# Patient Record
Sex: MALE | Race: WHITE | NOT HISPANIC OR LATINO | Employment: OTHER | RURAL
[De-identification: names, ages, dates, MRNs, and addresses within clinical notes are randomized per-mention and may not be internally consistent; named-entity substitution may affect disease eponyms.]

---

## 2022-04-18 RX ORDER — AMLODIPINE BESYLATE 10 MG/1
10 TABLET ORAL DAILY
Qty: 30 TABLET | Refills: 0 | Status: SHIPPED | OUTPATIENT
Start: 2022-04-18 | End: 2022-04-18

## 2022-04-18 RX ORDER — OMEPRAZOLE 40 MG/1
CAPSULE, DELAYED RELEASE ORAL
Qty: 30 CAPSULE | Refills: 0 | Status: SHIPPED | OUTPATIENT
Start: 2022-04-18 | End: 2022-04-18

## 2022-04-18 NOTE — TELEPHONE ENCOUNTER
Incoming Refill Request      Medication requested (name and dose): OMEPRAZOLE DR 40MG CAPSULE; AMLODIPINE BESYLATE 10MG TABLET    Pharm[facility] where request [facility] be sent[facility]    Additional details provided by patient: REFILLS     Best call back number: [phone]    Does the patient have less than a 3 day supply:  [x] Yes  [] No    Bolivar OTT Rep  22, 12:28 EDT

## 2022-04-19 DIAGNOSIS — K21.9 GASTROESOPHAGEAL REFLUX DISEASE WITHOUT ESOPHAGITIS: ICD-10-CM

## 2022-04-19 DIAGNOSIS — I10 PRIMARY HYPERTENSION: Primary | ICD-10-CM

## 2022-04-19 RX ORDER — AMLODIPINE BESYLATE 10 MG/1
10 TABLET ORAL DAILY
Qty: 90 TABLET | Refills: 1 | Status: SHIPPED | OUTPATIENT
Start: 2022-04-19 | End: 2022-12-08 | Stop reason: SDUPTHER

## 2022-04-19 RX ORDER — OMEPRAZOLE 40 MG/1
40 CAPSULE, DELAYED RELEASE ORAL DAILY
COMMUNITY
End: 2022-04-19 | Stop reason: SDUPTHER

## 2022-04-19 RX ORDER — OMEPRAZOLE 40 MG/1
40 CAPSULE, DELAYED RELEASE ORAL DAILY
Qty: 90 CAPSULE | Refills: 1 | Status: SHIPPED | OUTPATIENT
Start: 2022-04-19 | End: 2022-11-23

## 2022-04-19 RX ORDER — AMLODIPINE BESYLATE 10 MG/1
10 TABLET ORAL DAILY
COMMUNITY
Start: 2022-04-18 | End: 2022-04-19 | Stop reason: SDUPTHER

## 2022-04-20 ENCOUNTER — OFFICE VISIT (OUTPATIENT)
Dept: FAMILY MEDICINE CLINIC | Facility: CLINIC | Age: 77
End: 2022-04-20

## 2022-04-20 VITALS
SYSTOLIC BLOOD PRESSURE: 130 MMHG | WEIGHT: 246 LBS | DIASTOLIC BLOOD PRESSURE: 70 MMHG | HEIGHT: 71 IN | BODY MASS INDEX: 34.44 KG/M2

## 2022-04-20 DIAGNOSIS — I10 ESSENTIAL HYPERTENSION: ICD-10-CM

## 2022-04-20 DIAGNOSIS — I73.9 PVD (PERIPHERAL VASCULAR DISEASE): ICD-10-CM

## 2022-04-20 DIAGNOSIS — E78.2 MIXED HYPERLIPIDEMIA: ICD-10-CM

## 2022-04-20 DIAGNOSIS — E11.65 UNCONTROLLED TYPE 2 DIABETES MELLITUS WITH HYPERGLYCEMIA: Primary | ICD-10-CM

## 2022-04-20 PROCEDURE — 36415 COLL VENOUS BLD VENIPUNCTURE: CPT | Performed by: STUDENT IN AN ORGANIZED HEALTH CARE EDUCATION/TRAINING PROGRAM

## 2022-04-20 PROCEDURE — 99214 OFFICE O/P EST MOD 30 MIN: CPT | Performed by: STUDENT IN AN ORGANIZED HEALTH CARE EDUCATION/TRAINING PROGRAM

## 2022-04-20 RX ORDER — LISINOPRIL 10 MG/1
TABLET ORAL
COMMUNITY
Start: 2022-04-04 | End: 2022-05-26

## 2022-04-20 RX ORDER — METFORMIN HYDROCHLORIDE 500 MG/1
TABLET, EXTENDED RELEASE ORAL
COMMUNITY
Start: 2022-01-17 | End: 2022-04-20

## 2022-04-20 RX ORDER — ATENOLOL 50 MG/1
TABLET ORAL
COMMUNITY
End: 2022-05-24

## 2022-04-20 RX ORDER — GEMFIBROZIL 600 MG/1
TABLET, FILM COATED ORAL
COMMUNITY
Start: 2022-01-17 | End: 2022-05-26

## 2022-04-20 RX ORDER — ROSUVASTATIN CALCIUM 40 MG/1
TABLET, COATED ORAL
COMMUNITY
End: 2022-10-18

## 2022-04-20 RX ORDER — INSULIN ASPART 100 [IU]/ML
INJECTION, SUSPENSION SUBCUTANEOUS
COMMUNITY
End: 2023-03-01

## 2022-04-20 RX ORDER — CLOPIDOGREL BISULFATE 75 MG/1
TABLET ORAL
COMMUNITY
Start: 2022-01-17 | End: 2022-05-26

## 2022-04-20 RX ORDER — CHLORTHALIDONE 50 MG/1
TABLET ORAL
COMMUNITY
Start: 2022-03-05

## 2022-04-20 NOTE — ASSESSMENT & PLAN NOTE
Stable at this time. He does not think that he needs any refills of medications. Blood work due at this time.

## 2022-04-20 NOTE — PROGRESS NOTES
"Chief Complaint  Diabetes    Subjective          Elzbieta Nunez presents to [facility]  History of Present Illness    Diabetes: He states tat he has not been taking Metformin 2/2 severe diarrhea. He states that he stopped taking the metformin about a week ago, and the diarrhea has stopped. He has been checking his glucose and it is running in the high 100s. He has no low blood glucoses. He is due for labs at this time.     HLD: He has been tolerating medications without trouble. He is unsure if he needs refills on the Crestor.     GERD: Doing well with Omeprazole, better than previous. He still has occasional trouble with \"things getting hung up\". He does not have any severe pain.     HTN: He does not check much at home. He has no chest pain SOA, HA, or palpitations. Unsure if he needs refills.     Objective   Vital Signs:   /70 (BP Location: Left arm, Patient Position: Sitting, Cuff Size: Large Adult)   Ht 180.3 cm (71\")   Wt 112 kg (246 lb)   BMI 34.31 kg/m²     Body mass index is 34.31 kg/m².    Review of Systems    Past History:  Medical History: has a past medical history of Acute otitis externa, Acute otitis media, Atypical chest pain, Cellulitis, Diabetes mellitus type 1 (HCC), Dysuria, Esophagitis, Gastritis, Gastroenteritis, Glucosuria, Hyperlipidemia, Hypertension, Hypothyroidism, Insulin dependent diabetes mellitus type IA (HCC), Nicotine dependence, Obesity, Penetrating foreign body of skin of index finger, initial encounter, Peripheral vascular disease (HCC), and Sinusitis.   Surgical History: has a past surgical history that includes Leg Biopsy (02/01/2009).   Family History: family history includes Developmental delay (age of onset: 74) in his mother; Diabetes (age of onset: 90) in his father; Hypertension (age of onset: 90) in his father.   Social History: reports that he has been smoking cigarettes. He has been smoking about 1.00 pack per day. He has never " used smokeless tobacco. Alcohol use questions deferred to the physician. Drug use questions deferred to the physician.      Current Outpatient Medications:   •  amLODIPine (NORVASC) 10 MG tablet, Take 1 tablet by mouth Daily., Disp: 90 tablet, Rfl: 1  •  atenolol (TENORMIN) 50 MG tablet, atenolol 50 mg tablet  Daily, Disp: , Rfl:   •  clopidogrel (PLAVIX) 75 MG tablet, , Disp: , Rfl:   •  gemfibrozil (LOPID) 600 MG tablet, , Disp: , Rfl:   •  insulin aspart protamine-insulin aspart (novoLOG 70/30) injection, Novolog Mix 70-30 U-100 Insulin 100 unit/mL subcutaneous solution, Disp: , Rfl:   •  lisinopril (PRINIVIL,ZESTRIL) 10 MG tablet, , Disp: , Rfl:   •  omeprazole (priLOSEC) 40 MG capsule, Take 1 capsule by mouth Daily., Disp: 90 capsule, Rfl: 1  •  rosuvastatin (CRESTOR) 40 MG tablet, rosuvastatin 40 mg tablet  Take 1 tablet every day by oral route., Disp: , Rfl:   •  chlorthalidone (HYGROTEN) 50 MG tablet, , Disp: , Rfl:     Allergies: Niacin, Oxycodone hcl, and Oxycodone-acetaminophen    Physical Exam  Constitutional:       General: He is not in acute distress.     Appearance: Normal appearance. He is not ill-appearing or toxic-appearing.   HENT:      Head: Normocephalic and atraumatic.      Right Ear: Tympanic membrane and ear canal normal.      Left Ear: Tympanic membrane and ear canal normal.   Cardiovascular:      Rate and Rhythm: Normal rate and regular rhythm.      Heart sounds: No murmur heard.    No gallop.   Pulmonary:      Effort: Pulmonary effort is normal.      Breath sounds: Normal breath sounds.   Abdominal:      General: Abdomen is flat.      Palpations: Abdomen is soft.      Tenderness: There is no abdominal tenderness. There is no guarding.   Musculoskeletal:      Right lower leg: No edema.      Left lower leg: No edema.   Lymphadenopathy:      Cervical: No cervical adenopathy.   Neurological:      General: No focal deficit present.      Mental Status: He is alert and oriented to person, place,  and time.   Psychiatric:         Mood and Affect: Mood normal.         Thought Content: Thought content normal.          Result Review :{Labs  Result Review  Imaging  Med Tab  Media  Procedures :23}                   Assessment and Plan    Diagnoses and all orders for this visit:    1. Uncontrolled type 2 diabetes mellitus with hyperglycemia (HCC) (Primary)  Assessment & Plan:  He has been taking 10mg Glyburide BID from his family member as this has helped in the past at this time since he stopped the metformin. Will do A1c and blood work and of no improvement in A1c will plan on adding Farxiga. He is agreeable to this.     Orders:  -     CBC & Differential  -     Comprehensive Metabolic Panel  -     Hemoglobin A1c  -     Lipid Panel    2. Essential hypertension  Assessment & Plan:  Stable at this time. He does not think that he needs any refills of medications. Blood work due at this time.     Orders:  -     CBC & Differential  -     Comprehensive Metabolic Panel  -     Hemoglobin A1c  -     Lipid Panel    3. Mixed hyperlipidemia  Assessment & Plan:  Blood work due. Will contact with results. Continue current medications.     Orders:  -     CBC & Differential  -     Comprehensive Metabolic Panel  -     Hemoglobin A1c  -     Lipid Panel    4. PVD (peripheral vascular disease) (HCC)  Assessment & Plan:  He is having a procedure with vascular soon, will follow.     Orders:  -     CBC & Differential  -     Comprehensive Metabolic Panel  -     Hemoglobin A1c  -     Lipid Panel      Follow Up   No follow-ups on file.  Patient was given instructions and counseling regarding his condition or for health maintenance advice. Please see specific information pulled into the AVS if appropriate.     Divina Delgado, DO

## 2022-04-20 NOTE — ASSESSMENT & PLAN NOTE
He has been taking 10mg Glyburide BID from his family member as this has helped in the past at this time since he stopped the metformin. Will do A1c and blood work and of no improvement in A1c will plan on adding Farxiga. He is agreeable to this.

## 2022-04-21 LAB
ALBUMIN SERPL-MCNC: 4.3 G/DL (ref 3.7–4.7)
ALBUMIN/GLOB SERPL: 1.5 {RATIO} (ref 1.2–2.2)
ALP SERPL-CCNC: 81 IU/L (ref 44–121)
ALT SERPL-CCNC: 6 IU/L (ref 0–44)
AST SERPL-CCNC: 10 IU/L (ref 0–40)
BASOPHILS # BLD AUTO: 0 X10E3/UL (ref 0–0.2)
BASOPHILS NFR BLD AUTO: 0 %
BILIRUB SERPL-MCNC: <0.2 MG/DL (ref 0–1.2)
BUN SERPL-MCNC: 41 MG/DL (ref 8–27)
BUN/CREAT SERPL: 24 (ref 10–24)
CALCIUM SERPL-MCNC: 9.1 MG/DL (ref 8.6–10.2)
CHLORIDE SERPL-SCNC: 114 MMOL/L (ref 96–106)
CHOLEST SERPL-MCNC: 146 MG/DL (ref 100–199)
CO2 SERPL-SCNC: 16 MMOL/L (ref 20–29)
CREAT SERPL-MCNC: 1.74 MG/DL (ref 0.76–1.27)
EGFRCR SERPLBLD CKD-EPI 2021: 40 ML/MIN/1.73
EOSINOPHIL # BLD AUTO: 0.1 X10E3/UL (ref 0–0.4)
EOSINOPHIL NFR BLD AUTO: 2 %
ERYTHROCYTE [DISTWIDTH] IN BLOOD BY AUTOMATED COUNT: 13.5 % (ref 11.6–15.4)
GLOBULIN SER CALC-MCNC: 2.8 G/DL (ref 1.5–4.5)
GLUCOSE SERPL-MCNC: 108 MG/DL (ref 65–99)
HBA1C MFR BLD: 7.9 % (ref 4.8–5.6)
HCT VFR BLD AUTO: 32.4 % (ref 37.5–51)
HDLC SERPL-MCNC: 29 MG/DL
HGB BLD-MCNC: 11.1 G/DL (ref 13–17.7)
IMM GRANULOCYTES # BLD AUTO: 0 X10E3/UL (ref 0–0.1)
IMM GRANULOCYTES NFR BLD AUTO: 0 %
LDLC SERPL CALC-MCNC: 88 MG/DL (ref 0–99)
LYMPHOCYTES # BLD AUTO: 2.1 X10E3/UL (ref 0.7–3.1)
LYMPHOCYTES NFR BLD AUTO: 39 %
MCH RBC QN AUTO: 32.8 PG (ref 26.6–33)
MCHC RBC AUTO-ENTMCNC: 34.3 G/DL (ref 31.5–35.7)
MCV RBC AUTO: 96 FL (ref 79–97)
MONOCYTES # BLD AUTO: 0.6 X10E3/UL (ref 0.1–0.9)
MONOCYTES NFR BLD AUTO: 10 %
NEUTROPHILS # BLD AUTO: 2.6 X10E3/UL (ref 1.4–7)
NEUTROPHILS NFR BLD AUTO: 49 %
PLATELET # BLD AUTO: 253 X10E3/UL (ref 150–450)
POTASSIUM SERPL-SCNC: 5.5 MMOL/L (ref 3.5–5.2)
PROT SERPL-MCNC: 7.1 G/DL (ref 6–8.5)
RBC # BLD AUTO: 3.38 X10E6/UL (ref 4.14–5.8)
SODIUM SERPL-SCNC: 148 MMOL/L (ref 134–144)
TRIGL SERPL-MCNC: 165 MG/DL (ref 0–149)
VLDLC SERPL CALC-MCNC: 29 MG/DL (ref 5–40)
WBC # BLD AUTO: 5.4 X10E3/UL (ref 3.4–10.8)

## 2022-05-12 RX ORDER — ROSUVASTATIN CALCIUM 20 MG/1
TABLET, COATED ORAL
Qty: 90 TABLET | Refills: 0 | Status: SHIPPED | OUTPATIENT
Start: 2022-05-12 | End: 2022-05-26

## 2022-05-24 RX ORDER — ATENOLOL 50 MG/1
TABLET ORAL
Qty: 90 TABLET | Refills: 1 | Status: SHIPPED | OUTPATIENT
Start: 2022-05-24 | End: 2022-10-18

## 2022-05-26 RX ORDER — GEMFIBROZIL 600 MG/1
TABLET, FILM COATED ORAL
Qty: 180 TABLET | Refills: 1 | Status: SHIPPED | OUTPATIENT
Start: 2022-05-26 | End: 2022-11-22 | Stop reason: SDUPTHER

## 2022-05-26 RX ORDER — CLOPIDOGREL BISULFATE 75 MG/1
TABLET ORAL
Qty: 90 TABLET | Refills: 1 | Status: SHIPPED | OUTPATIENT
Start: 2022-05-26

## 2022-05-26 RX ORDER — CITALOPRAM 20 MG/1
TABLET ORAL
Qty: 90 TABLET | Refills: 1 | Status: SHIPPED | OUTPATIENT
Start: 2022-05-26 | End: 2022-12-08 | Stop reason: SDUPTHER

## 2022-05-26 RX ORDER — ROSUVASTATIN CALCIUM 20 MG/1
TABLET, COATED ORAL
Qty: 90 TABLET | Refills: 0 | Status: SHIPPED | OUTPATIENT
Start: 2022-05-26 | End: 2022-12-08 | Stop reason: SDUPTHER

## 2022-05-26 RX ORDER — METFORMIN HYDROCHLORIDE 500 MG/1
TABLET, EXTENDED RELEASE ORAL
Qty: 180 TABLET | Refills: 1 | Status: SHIPPED | OUTPATIENT
Start: 2022-05-26 | End: 2022-12-01

## 2022-05-26 RX ORDER — LISINOPRIL 10 MG/1
TABLET ORAL
Qty: 180 TABLET | Refills: 1 | Status: SHIPPED | OUTPATIENT
Start: 2022-05-26 | End: 2022-10-18

## 2022-10-18 ENCOUNTER — OFFICE VISIT (OUTPATIENT)
Dept: FAMILY MEDICINE CLINIC | Facility: CLINIC | Age: 77
End: 2022-10-18

## 2022-10-18 VITALS
WEIGHT: 226 LBS | DIASTOLIC BLOOD PRESSURE: 68 MMHG | HEART RATE: 63 BPM | HEIGHT: 71 IN | SYSTOLIC BLOOD PRESSURE: 130 MMHG | BODY MASS INDEX: 31.64 KG/M2 | OXYGEN SATURATION: 100 %

## 2022-10-18 DIAGNOSIS — I10 ESSENTIAL HYPERTENSION: ICD-10-CM

## 2022-10-18 DIAGNOSIS — N18.30 CKD STAGE 3 DUE TO TYPE 2 DIABETES MELLITUS: ICD-10-CM

## 2022-10-18 DIAGNOSIS — E11.22 CKD STAGE 3 DUE TO TYPE 2 DIABETES MELLITUS: ICD-10-CM

## 2022-10-18 DIAGNOSIS — I50.30 HEART FAILURE WITH PRESERVED EJECTION FRACTION, UNSPECIFIED HF CHRONICITY: Primary | ICD-10-CM

## 2022-10-18 DIAGNOSIS — E11.65 UNCONTROLLED TYPE 2 DIABETES MELLITUS WITH HYPERGLYCEMIA: ICD-10-CM

## 2022-10-18 DIAGNOSIS — E78.2 MIXED HYPERLIPIDEMIA: ICD-10-CM

## 2022-10-18 PROCEDURE — 36415 COLL VENOUS BLD VENIPUNCTURE: CPT | Performed by: STUDENT IN AN ORGANIZED HEALTH CARE EDUCATION/TRAINING PROGRAM

## 2022-10-18 PROCEDURE — 99214 OFFICE O/P EST MOD 30 MIN: CPT | Performed by: STUDENT IN AN ORGANIZED HEALTH CARE EDUCATION/TRAINING PROGRAM

## 2022-10-18 RX ORDER — BUMETANIDE 2 MG/1
TABLET ORAL
COMMUNITY
Start: 2022-10-05 | End: 2022-11-22 | Stop reason: SDUPTHER

## 2022-10-18 RX ORDER — HYDRALAZINE HYDROCHLORIDE 100 MG/1
TABLET, FILM COATED ORAL
COMMUNITY
Start: 2022-10-05 | End: 2022-12-08 | Stop reason: SDUPTHER

## 2022-10-18 RX ORDER — TORSEMIDE 100 MG/1
TABLET ORAL
COMMUNITY
Start: 2022-10-05 | End: 2022-11-23

## 2022-10-18 RX ORDER — CARVEDILOL 12.5 MG/1
TABLET ORAL
COMMUNITY
Start: 2022-10-05 | End: 2022-11-22 | Stop reason: SDUPTHER

## 2022-10-18 RX ORDER — PANTOPRAZOLE SODIUM 40 MG/1
TABLET, DELAYED RELEASE ORAL
COMMUNITY
Start: 2022-10-05 | End: 2022-11-22 | Stop reason: SDUPTHER

## 2022-10-18 NOTE — PROGRESS NOTES
"Chief Complaint  Hospital Follow Up Visit    Subjective          Elzbieta Nunez presents to [facility]  History of Present Illness    Patient states that he was admitted to the hospital on 9/28/22 and discharged on 10/5/22.    He was admitted to the hospital with acute kidney injury and CHF exacerbation.  He was found to have improvement over the course of his hospitalization with aggressive diuresis and monitoring of his hemodynamic status.  He was advised to follow-up with both cardiology and nephrology after his discharge.  He has both be scheduled, and sees cardiology tomorrow and nephrology in 2 weeks.    He states that he was overall doing well when he was initially discharged from the hospital, but states that over the past couple of days he has had more fatigue and some dizziness when he first stands up.  He has not had any syncopal episodes, but states that he is not holding onto something he feels like he is going to fall over.     Medications reconciled at the time of visit.  Hospital records reviewed and discussed with patient.  Objective   Vital Signs:   /68 (BP Location: Right arm, Patient Position: Sitting, Cuff Size: Large Adult)   Pulse 63   Ht 180.3 cm (71\")   Wt 103 kg (226 lb)   SpO2 100%   BMI 31.52 kg/m²     Body mass index is 31.52 kg/m².    Review of Systems    Past History:  Medical History: has a past medical history of Acute otitis externa, Acute otitis media, Atypical chest pain, Cellulitis, Diabetes mellitus type 1 (HCC), Dysuria, Esophagitis, Gastritis, Gastroenteritis, Glucosuria, Hyperlipidemia, Hypertension, Hypothyroidism, Insulin dependent diabetes mellitus type IA (HCC), Nicotine dependence, Obesity, Penetrating foreign body of skin of index finger, initial encounter, Peripheral vascular disease (HCC), and Sinusitis.   Surgical History: has a past surgical history that includes Leg Biopsy (02/01/2009).   Family History: family history " includes Developmental delay (age of onset: 74) in his mother; Diabetes (age of onset: 90) in his father; Hypertension (age of onset: 90) in his father.   Social History: reports that he has been smoking cigarettes. He has been smoking an average of 1 pack per day. He has never used smokeless tobacco. Alcohol use questions deferred to the physician. Drug use questions deferred to the physician.      Current Outpatient Medications:   •  amLODIPine (NORVASC) 10 MG tablet, Take 1 tablet by mouth Daily., Disp: 90 tablet, Rfl: 1  •  bumetanide (BUMEX) 2 MG tablet, , Disp: , Rfl:   •  carvedilol (COREG) 12.5 MG tablet, , Disp: , Rfl:   •  chlorthalidone (HYGROTEN) 50 MG tablet, , Disp: , Rfl:   •  citalopram (CeleXA) 20 MG tablet, TAKE ONE TABLET BY MOUTH DAILY, Disp: 90 tablet, Rfl: 1  •  clopidogrel (PLAVIX) 75 MG tablet, TAKE ONE TABLET BY MOUTH DAILY, Disp: 90 tablet, Rfl: 1  •  gemfibrozil (LOPID) 600 MG tablet, TAKE ONE TABLET BY MOUTH TWICE A DAY, Disp: 180 tablet, Rfl: 1  •  hydrALAZINE (APRESOLINE) 100 MG tablet, , Disp: , Rfl:   •  insulin aspart protamine-insulin aspart (novoLOG 70/30) injection, Novolog Mix 70-30 U-100 Insulin 100 unit/mL subcutaneous solution, Disp: , Rfl:   •  metFORMIN ER (GLUCOPHAGE-XR) 500 MG 24 hr tablet, TAKE TWO TABLETS BY MOUTH DAILY WITH EVENING MEAL, Disp: 180 tablet, Rfl: 1  •  omeprazole (priLOSEC) 40 MG capsule, Take 1 capsule by mouth Daily., Disp: 90 capsule, Rfl: 1  •  pantoprazole (PROTONIX) 40 MG EC tablet, , Disp: , Rfl:   •  rosuvastatin (CRESTOR) 20 MG tablet, TAKE ONE TABLET BY MOUTH DAILY, Disp: 90 tablet, Rfl: 0  •  torsemide (DEMADEX) 100 MG tablet, , Disp: , Rfl:     Allergies: Moxifloxacin, Niacin, Oxycodone hcl, and Oxycodone-acetaminophen    Physical Exam  Constitutional:       General: He is not in acute distress.     Appearance: He is not ill-appearing or toxic-appearing.   HENT:      Head: Normocephalic and atraumatic.   Cardiovascular:      Rate and Rhythm:  Normal rate and regular rhythm.      Heart sounds: No murmur heard.  Pulmonary:      Effort: Pulmonary effort is normal. No respiratory distress.   Abdominal:      General: There is no distension.      Palpations: Abdomen is soft.      Tenderness: There is no abdominal tenderness.   Musculoskeletal:      Right lower leg: No edema.      Left lower leg: No edema.   Neurological:      General: No focal deficit present.      Mental Status: He is alert and oriented to person, place, and time.   Psychiatric:         Mood and Affect: Mood normal.         Thought Content: Thought content normal.          Result Review :                   Assessment and Plan    Diagnoses and all orders for this visit:    1. Heart failure with preserved ejection fraction, unspecified HF chronicity (HCC) (Primary)    2. Uncontrolled type 2 diabetes mellitus with hyperglycemia (HCC)    3. Essential hypertension  -     CBC & Differential; Future  -     CBC & Differential    4. Mixed hyperlipidemia  -     Lipid Panel; Future  -     Lipid Panel    5. CKD stage 3 due to type 2 diabetes mellitus (HCC)  -     Comprehensive Metabolic Panel; Future  -     Magnesium; Future  -     Comprehensive Metabolic Panel  -     Magnesium    We will do blood work today as patient is having worsening weakness and dizziness associated with standing.  Discussed this could be hypovolemic in nature since he was discharged on 2 different aggressive diuretics.  Recommend that he hold the torsemide and only do 1 dose of Bumex until his next appointment with cardiology.  Continue to keep appointments with cardiology and nephrology which are both upcoming.    Will contact patient with results of blood work and changes as necessary.    Follow-up in 2 to 3 months for well visit    Follow Up   No follow-ups on file.  Patient was given instructions and counseling regarding his condition or for health maintenance advice. Please see specific information pulled into the AVS if  appropriate.     Divina Delgado, DO

## 2022-10-19 LAB
ALBUMIN SERPL-MCNC: 4.6 G/DL (ref 3.7–4.7)
ALBUMIN/GLOB SERPL: 1.4 {RATIO} (ref 1.2–2.2)
ALP SERPL-CCNC: 80 IU/L (ref 44–121)
ALT SERPL-CCNC: 9 IU/L (ref 0–44)
AST SERPL-CCNC: 12 IU/L (ref 0–40)
BASOPHILS # BLD AUTO: 0 X10E3/UL (ref 0–0.2)
BASOPHILS NFR BLD AUTO: 0 %
BILIRUB SERPL-MCNC: 0.2 MG/DL (ref 0–1.2)
BUN SERPL-MCNC: 93 MG/DL (ref 8–27)
BUN/CREAT SERPL: 33 (ref 10–24)
CALCIUM SERPL-MCNC: 9.9 MG/DL (ref 8.6–10.2)
CHLORIDE SERPL-SCNC: 91 MMOL/L (ref 96–106)
CHOLEST SERPL-MCNC: 154 MG/DL (ref 100–199)
CO2 SERPL-SCNC: 26 MMOL/L (ref 20–29)
CREAT SERPL-MCNC: 2.82 MG/DL (ref 0.76–1.27)
EGFRCR SERPLBLD CKD-EPI 2021: 22 ML/MIN/1.73
EOSINOPHIL # BLD AUTO: 0.1 X10E3/UL (ref 0–0.4)
EOSINOPHIL NFR BLD AUTO: 1 %
ERYTHROCYTE [DISTWIDTH] IN BLOOD BY AUTOMATED COUNT: 14.2 % (ref 11.6–15.4)
GLOBULIN SER CALC-MCNC: 3.2 G/DL (ref 1.5–4.5)
GLUCOSE SERPL-MCNC: 169 MG/DL (ref 70–99)
HCT VFR BLD AUTO: 28.8 % (ref 37.5–51)
HDLC SERPL-MCNC: 34 MG/DL
HGB BLD-MCNC: 9.8 G/DL (ref 13–17.7)
IMM GRANULOCYTES # BLD AUTO: 0.1 X10E3/UL (ref 0–0.1)
IMM GRANULOCYTES NFR BLD AUTO: 1 %
LDLC SERPL CALC-MCNC: 91 MG/DL (ref 0–99)
LYMPHOCYTES # BLD AUTO: 1.5 X10E3/UL (ref 0.7–3.1)
LYMPHOCYTES NFR BLD AUTO: 19 %
MAGNESIUM SERPL-MCNC: 2.5 MG/DL (ref 1.6–2.3)
MCH RBC QN AUTO: 32.7 PG (ref 26.6–33)
MCHC RBC AUTO-ENTMCNC: 34 G/DL (ref 31.5–35.7)
MCV RBC AUTO: 96 FL (ref 79–97)
MONOCYTES # BLD AUTO: 0.9 X10E3/UL (ref 0.1–0.9)
MONOCYTES NFR BLD AUTO: 11 %
NEUTROPHILS # BLD AUTO: 5.3 X10E3/UL (ref 1.4–7)
NEUTROPHILS NFR BLD AUTO: 68 %
PLATELET # BLD AUTO: 308 X10E3/UL (ref 150–450)
POTASSIUM SERPL-SCNC: 4.2 MMOL/L (ref 3.5–5.2)
PROT SERPL-MCNC: 7.8 G/DL (ref 6–8.5)
RBC # BLD AUTO: 3 X10E6/UL (ref 4.14–5.8)
SODIUM SERPL-SCNC: 136 MMOL/L (ref 134–144)
TRIGL SERPL-MCNC: 164 MG/DL (ref 0–149)
VLDLC SERPL CALC-MCNC: 29 MG/DL (ref 5–40)
WBC # BLD AUTO: 7.8 X10E3/UL (ref 3.4–10.8)

## 2022-11-04 ENCOUNTER — OFFICE VISIT (OUTPATIENT)
Dept: FAMILY MEDICINE CLINIC | Facility: CLINIC | Age: 77
End: 2022-11-04

## 2022-11-04 VITALS
HEIGHT: 71 IN | BODY MASS INDEX: 32.2 KG/M2 | SYSTOLIC BLOOD PRESSURE: 122 MMHG | DIASTOLIC BLOOD PRESSURE: 68 MMHG | WEIGHT: 230 LBS

## 2022-11-04 DIAGNOSIS — M10.379 ACUTE GOUT DUE TO RENAL IMPAIRMENT INVOLVING ANKLE, UNSPECIFIED LATERALITY: ICD-10-CM

## 2022-11-04 DIAGNOSIS — I10 ESSENTIAL HYPERTENSION: ICD-10-CM

## 2022-11-04 DIAGNOSIS — N17.9 ACUTE RENAL FAILURE SUPERIMPOSED ON STAGE 4 CHRONIC KIDNEY DISEASE, UNSPECIFIED ACUTE RENAL FAILURE TYPE: Primary | ICD-10-CM

## 2022-11-04 DIAGNOSIS — E11.65 UNCONTROLLED TYPE 2 DIABETES MELLITUS WITH HYPERGLYCEMIA: ICD-10-CM

## 2022-11-04 DIAGNOSIS — N18.4 ACUTE RENAL FAILURE SUPERIMPOSED ON STAGE 4 CHRONIC KIDNEY DISEASE, UNSPECIFIED ACUTE RENAL FAILURE TYPE: Primary | ICD-10-CM

## 2022-11-04 PROCEDURE — 99214 OFFICE O/P EST MOD 30 MIN: CPT | Performed by: STUDENT IN AN ORGANIZED HEALTH CARE EDUCATION/TRAINING PROGRAM

## 2022-11-04 RX ORDER — ALLOPURINOL 100 MG/1
TABLET ORAL
COMMUNITY
Start: 2022-10-26

## 2022-11-04 RX ORDER — NICOTINE 21 MG/24HR
1 PATCH, TRANSDERMAL 24 HOURS TRANSDERMAL EVERY 24 HOURS
COMMUNITY
End: 2022-11-22 | Stop reason: SDUPTHER

## 2022-11-04 RX ORDER — ASPIRIN 81 MG/1
81 TABLET ORAL DAILY
COMMUNITY

## 2022-11-04 NOTE — PROGRESS NOTES
"Chief Complaint  Hospital Follow Up Visit    Subjective          Elzbieta Nunez presents to [facility]  History of Present Illness    Patient is here for hospital follow up. He was admitted to [facility] on 10/19/22 and discharged on 10/26/22. He was found to have acute kidney injury and acute right ankle gout. Blood pressure was elevated at the time of the visit, and he had this adjusted at the hospital and he is overall doing well. He states that he does not need any refills of his medications.     He states that his glucose has been elevated since he hs been on the prednisone. He states that he has been having trouble getting it down, but he has no symptoms of high or low blood glucose at this time.     He does not need any refills of medications at this time, and he does not have any concerns about his medications.     He has appropriate follow up scheduled.   Objective   Vital Signs:   /68 (BP Location: Left arm, Patient Position: Sitting, Cuff Size: Large Adult)   Ht 180.3 cm (71\")   Wt 104 kg (230 lb)   BMI 32.08 kg/m²     Body mass index is 32.08 kg/m².    Review of Systems    Past History:  Medical History: has a past medical history of Acute otitis externa, Acute otitis media, Atypical chest pain, Cellulitis, Diabetes mellitus type 1 (HCC), Dysuria, Esophagitis, Gastritis, Gastroenteritis, Glucosuria, Hyperlipidemia, Hypertension, Hypothyroidism, Insulin dependent diabetes mellitus type IA (HCC), Nicotine dependence, Obesity, Penetrating foreign body of skin of index finger, initial encounter, Peripheral vascular disease (HCC), and Sinusitis.   Surgical History: has a past surgical history that includes Leg Biopsy (02/01/2009).   Family History: family history includes Developmental delay (age of onset: 74) in his mother; Diabetes (age of onset: 90) in his father; Hypertension (age of onset: 90) in his father.   Social History: reports that he has been smoking " cigarettes. He has been smoking an average of 1 pack per day. He has never used smokeless tobacco. Alcohol use questions deferred to the physician. Drug use questions deferred to the physician.      Current Outpatient Medications:   •  allopurinol (ZYLOPRIM) 100 MG tablet, , Disp: , Rfl:   •  amLODIPine (NORVASC) 10 MG tablet, Take 1 tablet by mouth Daily., Disp: 90 tablet, Rfl: 1  •  aspirin 81 MG EC tablet, Take 1 tablet by mouth Daily., Disp: , Rfl:   •  bumetanide (BUMEX) 2 MG tablet, , Disp: , Rfl:   •  carvedilol (COREG) 12.5 MG tablet, , Disp: , Rfl:   •  chlorthalidone (HYGROTEN) 50 MG tablet, , Disp: , Rfl:   •  citalopram (CeleXA) 20 MG tablet, TAKE ONE TABLET BY MOUTH DAILY, Disp: 90 tablet, Rfl: 1  •  clopidogrel (PLAVIX) 75 MG tablet, TAKE ONE TABLET BY MOUTH DAILY, Disp: 90 tablet, Rfl: 1  •  gemfibrozil (LOPID) 600 MG tablet, TAKE ONE TABLET BY MOUTH TWICE A DAY, Disp: 180 tablet, Rfl: 1  •  hydrALAZINE (APRESOLINE) 100 MG tablet, , Disp: , Rfl:   •  insulin aspart protamine-insulin aspart (novoLOG 70/30) injection, Novolog Mix 70-30 U-100 Insulin 100 unit/mL subcutaneous solution, Disp: , Rfl:   •  metFORMIN ER (GLUCOPHAGE-XR) 500 MG 24 hr tablet, TAKE TWO TABLETS BY MOUTH DAILY WITH EVENING MEAL, Disp: 180 tablet, Rfl: 1  •  nicotine (NICODERM CQ) 21 MG/24HR patch, Place 1 patch on the skin as directed by provider Daily., Disp: , Rfl:   •  omeprazole (priLOSEC) 40 MG capsule, Take 1 capsule by mouth Daily., Disp: 90 capsule, Rfl: 1  •  pantoprazole (PROTONIX) 40 MG EC tablet, , Disp: , Rfl:   •  rosuvastatin (CRESTOR) 20 MG tablet, TAKE ONE TABLET BY MOUTH DAILY, Disp: 90 tablet, Rfl: 0  •  torsemide (DEMADEX) 100 MG tablet, , Disp: , Rfl:     Allergies: Moxifloxacin, Niacin, Oxycodone hcl, and Oxycodone-acetaminophen    Physical Exam  Constitutional:       General: He is not in acute distress.     Appearance: He is not ill-appearing or toxic-appearing.   HENT:      Head: Normocephalic and  atraumatic.   Cardiovascular:      Rate and Rhythm: Normal rate and regular rhythm.      Heart sounds: No murmur heard.  Pulmonary:      Effort: Pulmonary effort is normal. No respiratory distress.   Neurological:      General: No focal deficit present.      Mental Status: He is alert and oriented to person, place, and time.   Psychiatric:         Mood and Affect: Mood normal.         Thought Content: Thought content normal.          Result Review :                   Assessment and Plan    Diagnoses and all orders for this visit:    1. Acute renal failure superimposed on stage 4 chronic kidney disease, unspecified acute renal failure type (HCC) (Primary)    2. Acute gout due to renal impairment involving ankle, unspecified laterality    3. Uncontrolled type 2 diabetes mellitus with hyperglycemia (HCC)    4. Essential hypertension    Overall patient doing well at this time reviewed outside records and medications were reconciled at the time of the visit. He does not need refills at this time.     Discussed blood glucose elevations are likely related to his recent burst of prednisone and he should continue to monitor his glucose over the next several days as this should start to normalize. Will see back in 8 weeks to discuss titration of medications if needed. He is agreeable to this. Call with any new or worsening symptoms.       Follow Up   No follow-ups on file.  Patient was given instructions and counseling regarding his condition or for health maintenance advice. Please see specific information pulled into the AVS if appropriate.     Divina Delgado, DO

## 2022-11-22 DIAGNOSIS — I10 ESSENTIAL HYPERTENSION: ICD-10-CM

## 2022-11-22 DIAGNOSIS — N18.4 ACUTE RENAL FAILURE SUPERIMPOSED ON STAGE 4 CHRONIC KIDNEY DISEASE, UNSPECIFIED ACUTE RENAL FAILURE TYPE: Primary | ICD-10-CM

## 2022-11-22 DIAGNOSIS — E11.65 UNCONTROLLED TYPE 2 DIABETES MELLITUS WITH HYPERGLYCEMIA: ICD-10-CM

## 2022-11-22 DIAGNOSIS — E78.2 MIXED HYPERLIPIDEMIA: ICD-10-CM

## 2022-11-22 DIAGNOSIS — N17.9 ACUTE RENAL FAILURE SUPERIMPOSED ON STAGE 4 CHRONIC KIDNEY DISEASE, UNSPECIFIED ACUTE RENAL FAILURE TYPE: Primary | ICD-10-CM

## 2022-11-22 NOTE — TELEPHONE ENCOUNTER
Caller: LIBIA WRIGHT    Relationship: Spouse    Best call back number: [phone]    Requested Prescriptions:   Requested Prescriptions     Pending Prescriptions Disp Refills   • bumetanide (BUMEX) 2 MG tablet     • carvedilol (COREG) 12.5 MG tablet     • pantoprazole (PROTONIX) 40 MG EC tablet     • gemfibrozil (LOPID) 600 MG tablet 180 tablet 1     Sig: Take 1 tablet by mouth 2 (Two) Times a Day.   • nicotine (NICODERM CQ) 21 MG/24HR patch       Sig: Place 1 patch on the skin as directed by provider Daily.        Pharmacy where request should be sent: [facility] [id number] - [place] [address] - [phone] [facility] [phone] FX     Additional details provided by patient: PATIENT'S WIFE ADVISES THAT PHARMACY IS REQUESTING SCRIPTS TO BE SENT FOR THESE MEDICATIONS, PHARMACY ADVISES THEY DO NOT HAVE THESE PRESCRIPTIONS FOR HIM.    Does the patient have less than a 3 day supply:  [x] Yes  [] No    Bolivar Maier Rep   11/22/22 14:18 EST

## 2022-11-23 ENCOUNTER — LAB (OUTPATIENT)
Dept: FAMILY MEDICINE CLINIC | Facility: CLINIC | Age: 77
End: 2022-11-23

## 2022-11-23 DIAGNOSIS — I10 ESSENTIAL HYPERTENSION: ICD-10-CM

## 2022-11-23 DIAGNOSIS — E11.65 UNCONTROLLED TYPE 2 DIABETES MELLITUS WITH HYPERGLYCEMIA: ICD-10-CM

## 2022-11-23 DIAGNOSIS — E78.2 MIXED HYPERLIPIDEMIA: ICD-10-CM

## 2022-11-23 DIAGNOSIS — N18.4 ACUTE RENAL FAILURE SUPERIMPOSED ON STAGE 4 CHRONIC KIDNEY DISEASE, UNSPECIFIED ACUTE RENAL FAILURE TYPE: Primary | ICD-10-CM

## 2022-11-23 DIAGNOSIS — N17.9 ACUTE RENAL FAILURE SUPERIMPOSED ON STAGE 4 CHRONIC KIDNEY DISEASE, UNSPECIFIED ACUTE RENAL FAILURE TYPE: Primary | ICD-10-CM

## 2022-11-23 PROCEDURE — 36415 COLL VENOUS BLD VENIPUNCTURE: CPT | Performed by: STUDENT IN AN ORGANIZED HEALTH CARE EDUCATION/TRAINING PROGRAM

## 2022-11-23 RX ORDER — NICOTINE 21 MG/24HR
1 PATCH, TRANSDERMAL 24 HOURS TRANSDERMAL EVERY 24 HOURS
Qty: 30 EACH | Refills: 1 | Status: SHIPPED | OUTPATIENT
Start: 2022-11-23

## 2022-11-23 RX ORDER — BUMETANIDE 2 MG/1
2 TABLET ORAL DAILY
Qty: 30 TABLET | Refills: 1 | Status: SHIPPED | OUTPATIENT
Start: 2022-11-23 | End: 2023-02-07 | Stop reason: SDUPTHER

## 2022-11-23 RX ORDER — PANTOPRAZOLE SODIUM 40 MG/1
40 TABLET, DELAYED RELEASE ORAL DAILY
Qty: 90 TABLET | Refills: 1 | Status: SHIPPED | OUTPATIENT
Start: 2022-11-23

## 2022-11-23 RX ORDER — CARVEDILOL 12.5 MG/1
12.5 TABLET ORAL 2 TIMES DAILY WITH MEALS
Qty: 180 TABLET | Refills: 1 | Status: SHIPPED | OUTPATIENT
Start: 2022-11-23 | End: 2022-12-21

## 2022-11-23 RX ORDER — GEMFIBROZIL 600 MG/1
600 TABLET, FILM COATED ORAL 2 TIMES DAILY
Qty: 180 TABLET | Refills: 1 | Status: SHIPPED | OUTPATIENT
Start: 2022-11-23

## 2022-11-23 NOTE — TELEPHONE ENCOUNTER
Rx Refill Note  Requested Prescriptions     Pending Prescriptions Disp Refills    bumetanide (BUMEX) 2 MG tablet      carvedilol (COREG) 12.5 MG tablet      pantoprazole (PROTONIX) 40 MG EC tablet      gemfibrozil (LOPID) 600 MG tablet 180 tablet 1     Sig: Take 1 tablet by mouth 2 (Two) Times a Day.    nicotine (NICODERM CQ) 21 MG/24HR patch       Sig: Place 1 patch on the skin as directed by provider Daily.      Last office visit with prescribing clinician: 11/4/2022      Next office visit with prescribing clinician: 12/1/2022            Brisa Jackman MA  11/23/22, 10:02 EST

## 2022-11-24 LAB
ALBUMIN SERPL-MCNC: 4.4 G/DL (ref 3.7–4.7)
ALBUMIN/GLOB SERPL: 1.6 {RATIO} (ref 1.2–2.2)
ALP SERPL-CCNC: 76 IU/L (ref 44–121)
ALT SERPL-CCNC: 13 IU/L (ref 0–44)
APPEARANCE UR: CLEAR
AST SERPL-CCNC: 21 IU/L (ref 0–40)
BASOPHILS # BLD AUTO: 0 X10E3/UL (ref 0–0.2)
BASOPHILS NFR BLD AUTO: 0 %
BILIRUB SERPL-MCNC: <0.2 MG/DL (ref 0–1.2)
BILIRUB UR QL STRIP: NEGATIVE
BUN SERPL-MCNC: 44 MG/DL (ref 8–27)
BUN/CREAT SERPL: 27 (ref 10–24)
CALCIUM SERPL-MCNC: 9.4 MG/DL (ref 8.6–10.2)
CHLORIDE SERPL-SCNC: 105 MMOL/L (ref 96–106)
CHOLEST SERPL-MCNC: 158 MG/DL (ref 100–199)
CO2 SERPL-SCNC: 21 MMOL/L (ref 20–29)
COLOR UR: YELLOW
CREAT SERPL-MCNC: 1.65 MG/DL (ref 0.76–1.27)
CREAT UR-MCNC: 69.9 MG/DL
EGFRCR SERPLBLD CKD-EPI 2021: 43 ML/MIN/1.73
EOSINOPHIL # BLD AUTO: 0.3 X10E3/UL (ref 0–0.4)
EOSINOPHIL NFR BLD AUTO: 5 %
ERYTHROCYTE [DISTWIDTH] IN BLOOD BY AUTOMATED COUNT: 14.4 % (ref 11.6–15.4)
GLOBULIN SER CALC-MCNC: 2.7 G/DL (ref 1.5–4.5)
GLUCOSE SERPL-MCNC: 71 MG/DL (ref 70–99)
GLUCOSE UR QL STRIP: NEGATIVE
HCT VFR BLD AUTO: 26.6 % (ref 37.5–51)
HDLC SERPL-MCNC: 33 MG/DL
HGB BLD-MCNC: 8.6 G/DL (ref 13–17.7)
HGB UR QL STRIP: NEGATIVE
IMM GRANULOCYTES # BLD AUTO: 0 X10E3/UL (ref 0–0.1)
IMM GRANULOCYTES NFR BLD AUTO: 1 %
KETONES UR QL STRIP: NEGATIVE
LDLC SERPL CALC-MCNC: 95 MG/DL (ref 0–99)
LEUKOCYTE ESTERASE UR QL STRIP: NEGATIVE
LYMPHOCYTES # BLD AUTO: 1.7 X10E3/UL (ref 0.7–3.1)
LYMPHOCYTES NFR BLD AUTO: 30 %
MCH RBC QN AUTO: 31.9 PG (ref 26.6–33)
MCHC RBC AUTO-ENTMCNC: 32.3 G/DL (ref 31.5–35.7)
MCV RBC AUTO: 99 FL (ref 79–97)
MONOCYTES # BLD AUTO: 0.7 X10E3/UL (ref 0.1–0.9)
MONOCYTES NFR BLD AUTO: 13 %
NEUTROPHILS # BLD AUTO: 2.9 X10E3/UL (ref 1.4–7)
NEUTROPHILS NFR BLD AUTO: 51 %
NITRITE UR QL STRIP: NEGATIVE
PH UR STRIP: 6.5 [PH] (ref 5–7.5)
PHOSPHATE SERPL-MCNC: 4.5 MG/DL (ref 2.8–4.1)
PLATELET # BLD AUTO: 483 X10E3/UL (ref 150–450)
POTASSIUM SERPL-SCNC: 4.5 MMOL/L (ref 3.5–5.2)
PROT SERPL-MCNC: 7.1 G/DL (ref 6–8.5)
PROT UR QL STRIP: ABNORMAL
PROT UR-MCNC: 81.8 MG/DL
PROT/CREAT UR: 1170 MG/G CREAT (ref 0–200)
RBC # BLD AUTO: 2.7 X10E6/UL (ref 4.14–5.8)
SODIUM SERPL-SCNC: 146 MMOL/L (ref 134–144)
SP GR UR STRIP: 1.01 (ref 1–1.03)
T4 FREE SERPL-MCNC: 0.78 NG/DL (ref 0.82–1.77)
TRIGL SERPL-MCNC: 169 MG/DL (ref 0–149)
TSH SERPL DL<=0.005 MIU/L-ACNC: 3.65 UIU/ML (ref 0.45–4.5)
URATE SERPL-MCNC: 9.6 MG/DL (ref 3.8–8.4)
UROBILINOGEN UR STRIP-MCNC: 0.2 MG/DL (ref 0.2–1)
VLDLC SERPL CALC-MCNC: 30 MG/DL (ref 5–40)
WBC # BLD AUTO: 5.7 X10E3/UL (ref 3.4–10.8)

## 2022-11-25 LAB — HBA1C MFR BLD: NORMAL %

## 2022-11-26 LAB
BACTERIA #/AREA URNS HPF: NORMAL /[HPF]
CASTS URNS QL MICRO: NORMAL /LPF
EPI CELLS #/AREA URNS HPF: NORMAL /HPF (ref 0–10)
RBC #/AREA URNS HPF: NORMAL /HPF (ref 0–2)
WBC #/AREA URNS HPF: NORMAL /HPF (ref 0–5)

## 2022-12-01 ENCOUNTER — OFFICE VISIT (OUTPATIENT)
Dept: FAMILY MEDICINE CLINIC | Facility: CLINIC | Age: 77
End: 2022-12-01

## 2022-12-01 VITALS
WEIGHT: 250 LBS | SYSTOLIC BLOOD PRESSURE: 122 MMHG | HEART RATE: 66 BPM | HEIGHT: 71 IN | OXYGEN SATURATION: 97 % | BODY MASS INDEX: 35 KG/M2 | DIASTOLIC BLOOD PRESSURE: 56 MMHG

## 2022-12-01 DIAGNOSIS — D53.9 MACROCYTIC ANEMIA: Primary | ICD-10-CM

## 2022-12-01 DIAGNOSIS — E11.65 UNCONTROLLED TYPE 2 DIABETES MELLITUS WITH HYPERGLYCEMIA: ICD-10-CM

## 2022-12-01 DIAGNOSIS — I10 ESSENTIAL HYPERTENSION: ICD-10-CM

## 2022-12-01 PROCEDURE — G0008 ADMIN INFLUENZA VIRUS VAC: HCPCS | Performed by: STUDENT IN AN ORGANIZED HEALTH CARE EDUCATION/TRAINING PROGRAM

## 2022-12-01 PROCEDURE — 90662 IIV NO PRSV INCREASED AG IM: CPT | Performed by: STUDENT IN AN ORGANIZED HEALTH CARE EDUCATION/TRAINING PROGRAM

## 2022-12-01 PROCEDURE — 36415 COLL VENOUS BLD VENIPUNCTURE: CPT | Performed by: STUDENT IN AN ORGANIZED HEALTH CARE EDUCATION/TRAINING PROGRAM

## 2022-12-01 PROCEDURE — 99214 OFFICE O/P EST MOD 30 MIN: CPT | Performed by: STUDENT IN AN ORGANIZED HEALTH CARE EDUCATION/TRAINING PROGRAM

## 2022-12-01 NOTE — PROGRESS NOTES
"Chief Complaint  Diabetes, Hypertension, and discuss labs (Pt is here to discuss labs today. He is here with wife floridalma. )    Stefan Nunez presents to [facility]  History of Present Illness    Patient is here for follow-up and discussion of recent blood work.    Patient has stopped taking the metformin secondary to renal function.  He has seen nephrology recently and medication changes were updated on his medicine list.    He denies any current issues and states his blood pressure has been running well at home.  He denies any chest pain, shortness of breath, headache.    Objective   Vital Signs:   /56   Pulse 66   Ht 180.3 cm (71\")   Wt 113 kg (250 lb)   SpO2 97%   BMI 34.87 kg/m²     Body mass index is 34.87 kg/m².    Review of Systems    Past History:  Medical History: has a past medical history of Acute otitis externa, Acute otitis media, Atypical chest pain, Cellulitis, Diabetes mellitus type 1 (HCC), Dysuria, Esophagitis, Gastritis, Gastroenteritis, Glucosuria, Hyperlipidemia, Hypertension, Hypothyroidism, Insulin dependent diabetes mellitus type IA (HCC), Nicotine dependence, Obesity, Penetrating foreign body of skin of index finger, initial encounter, Peripheral vascular disease (HCC), and Sinusitis.   Surgical History: has a past surgical history that includes Leg Biopsy (02/01/2009).   Family History: family history includes Developmental delay (age of onset: 74) in his mother; Diabetes (age of onset: 90) in his father; Hypertension (age of onset: 90) in his father.   Social History: reports that he quit smoking about 10 months ago. His smoking use included cigarettes. He started smoking about 46 years ago. He has a 46.00 pack-year smoking history. He has been exposed to tobacco smoke. He has never used smokeless tobacco. He reports that he does not currently use alcohol. Drug use questions deferred to the physician.      Current Outpatient " Medications:   •  allopurinol (ZYLOPRIM) 100 MG tablet, , Disp: , Rfl:   •  amLODIPine (NORVASC) 10 MG tablet, Take 1 tablet by mouth Daily., Disp: 90 tablet, Rfl: 1  •  aspirin 81 MG EC tablet, Take 1 tablet by mouth Daily., Disp: , Rfl:   •  bumetanide (BUMEX) 2 MG tablet, Take 1 tablet by mouth Daily., Disp: 30 tablet, Rfl: 1  •  carvedilol (COREG) 12.5 MG tablet, Take 1 tablet by mouth 2 (Two) Times a Day With Meals., Disp: 180 tablet, Rfl: 1  •  chlorthalidone (HYGROTEN) 50 MG tablet, , Disp: , Rfl:   •  citalopram (CeleXA) 20 MG tablet, TAKE ONE TABLET BY MOUTH DAILY, Disp: 90 tablet, Rfl: 1  •  clopidogrel (PLAVIX) 75 MG tablet, TAKE ONE TABLET BY MOUTH DAILY, Disp: 90 tablet, Rfl: 1  •  gemfibrozil (LOPID) 600 MG tablet, Take 1 tablet by mouth 2 (Two) Times a Day., Disp: 180 tablet, Rfl: 1  •  hydrALAZINE (APRESOLINE) 100 MG tablet, , Disp: , Rfl:   •  insulin aspart protamine-insulin aspart (novoLOG 70/30) injection, Novolog Mix 70-30 U-100 Insulin 100 unit/mL subcutaneous solution, Disp: , Rfl:   •  nicotine (NICODERM CQ) 21 MG/24HR patch, Place 1 patch on the skin as directed by provider Daily., Disp: 30 each, Rfl: 1  •  pantoprazole (PROTONIX) 40 MG EC tablet, Take 1 tablet by mouth Daily., Disp: 90 tablet, Rfl: 1  •  rosuvastatin (CRESTOR) 20 MG tablet, TAKE ONE TABLET BY MOUTH DAILY, Disp: 90 tablet, Rfl: 0    Allergies: Moxifloxacin, Niacin, Oxycodone hcl, and Oxycodone-acetaminophen    Physical Exam  Constitutional:       General: He is not in acute distress.     Appearance: He is not ill-appearing or toxic-appearing.   HENT:      Head: Normocephalic and atraumatic.   Cardiovascular:      Rate and Rhythm: Normal rate and regular rhythm.      Heart sounds: No murmur heard.  Pulmonary:      Effort: Pulmonary effort is normal. No respiratory distress.   Neurological:      General: No focal deficit present.      Mental Status: He is alert and oriented to person, place, and time.   Psychiatric:          Mood and Affect: Mood normal.         Thought Content: Thought content normal.          Result Review :                   Assessment and Plan {CC Problem List  Visit Diagnosis   ROS  Review (Popup)  Health Maintenance  Quality  BestPractice  Medications  SmartSets  SnapShot Encounters  Media :23}   Diagnoses and all orders for this visit:    1. Macrocytic anemia (Primary)  -     Vitamin B12 & Folate; Future  -     Vitamin B12 & Folate    2. Uncontrolled type 2 diabetes mellitus with hyperglycemia (HCC)  -     Hemoglobin A1c; Future  -     Ambulatory Referral to Endocrinology  -     Hemoglobin A1c    3. Essential hypertension    Other orders  -     Fluzone High-Dose 65+yrs ([id number])    Discussed blood work for patient.  A1c was not done last labs so we will order today and contact patient with results available.  We will add vitamin B12 and folate as patient was found to have a macrocytic anemia is likely contributing to his baseline fatigue.  Unclear if this is B12 or folate deficiency versus chronic kidney disease related.    Discussed that with the significant CKD as well as difficulty controlling his blood sugar will send to endocrinology for further evaluation and management of diabetes.  He is agreeable to this.    Flu shot given in the office today.  I spent 38 minutes caring for Elzbieta on this date of service. This time includes time spent by me in the following activities:preparing for the visit, reviewing tests, performing a medically appropriate examination and/or evaluation , counseling and educating the patient/family/caregiver, referring and communicating with other health care professionals  and documenting information in the medical record  Follow Up   No follow-ups on file.  Patient was given instructions and counseling regarding his condition or for health maintenance advice. Please see specific information pulled into the AVS if appropriate.     Divina Delgado, DO

## 2022-12-02 LAB
FOLATE SERPL-MCNC: 14.6 NG/ML
HBA1C MFR BLD: 8 % (ref 4.8–5.6)
VIT B12 SERPL-MCNC: 595 PG/ML (ref 232–1245)

## 2022-12-06 ENCOUNTER — TELEPHONE (OUTPATIENT)
Dept: FAMILY MEDICINE CLINIC | Facility: CLINIC | Age: 77
End: 2022-12-06

## 2022-12-06 DIAGNOSIS — I10 PRIMARY HYPERTENSION: ICD-10-CM

## 2022-12-06 RX ORDER — PREDNISONE 10 MG/1
40 TABLET ORAL DAILY
Qty: 20 TABLET | Refills: 0 | Status: SHIPPED | OUTPATIENT
Start: 2022-12-06 | End: 2022-12-11

## 2022-12-06 NOTE — TELEPHONE ENCOUNTER
Can someone with the ability to fax more than one lab at a time-send this record over. They probably need the labs from 11/23-12/01. The information is provided in the msg below.    Thank you so much!

## 2022-12-06 NOTE — TELEPHONE ENCOUNTER
Caller: LIBIA WRIGHT    Relationship: Spouse    Best call back number: [phone]    What was the call regarding:   PATIENT'S (WIFE) LIBIA STATED THAT SHE WAS INFORMED BY DR BJ WADSWORTH OFFICE THAT THEY NEED FOR PATIENT'S MOST RECENT LAB RESULTS TO BE FAXED TO THEIR OFFICE BEFORE PATIENT'S APPOINTMENT 12/09/2022  SINCE THEY HAVE NOT RECEIVED THESE RESULTS AT THEIR OFFICE AT THIS TIME     DR BJ WADSWORTH  FAX: [phone]    Do you require a callback: YES

## 2022-12-06 NOTE — TELEPHONE ENCOUNTER
We will follow the are on the neck that is swollen, and I will send in a burst of prednisone to the pharmacy to [facility] with the gout pain.

## 2022-12-08 RX ORDER — CITALOPRAM 20 MG/1
20 TABLET ORAL DAILY
Qty: 90 TABLET | Refills: 1 | Status: SHIPPED | OUTPATIENT
Start: 2022-12-08

## 2022-12-08 RX ORDER — HYDRALAZINE HYDROCHLORIDE 100 MG/1
100 TABLET, FILM COATED ORAL 3 TIMES DAILY
Qty: 270 TABLET | Refills: 1 | Status: SHIPPED | OUTPATIENT
Start: 2022-12-08

## 2022-12-08 RX ORDER — AMLODIPINE BESYLATE 10 MG/1
10 TABLET ORAL DAILY
Qty: 90 TABLET | Refills: 1 | Status: SHIPPED | OUTPATIENT
Start: 2022-12-08

## 2022-12-08 RX ORDER — ROSUVASTATIN CALCIUM 20 MG/1
20 TABLET, COATED ORAL DAILY
Qty: 90 TABLET | Refills: 1 | Status: SHIPPED | OUTPATIENT
Start: 2022-12-08

## 2022-12-21 ENCOUNTER — OFFICE VISIT (OUTPATIENT)
Dept: FAMILY MEDICINE CLINIC | Facility: CLINIC | Age: 77
End: 2022-12-21

## 2022-12-21 VITALS — DIASTOLIC BLOOD PRESSURE: 58 MMHG | SYSTOLIC BLOOD PRESSURE: 120 MMHG | OXYGEN SATURATION: 88 % | HEART RATE: 67 BPM

## 2022-12-21 DIAGNOSIS — R79.81 LOW OXYGEN SATURATION: Primary | ICD-10-CM

## 2022-12-21 DIAGNOSIS — R06.02 SHORTNESS OF BREATH: ICD-10-CM

## 2022-12-21 PROCEDURE — 99213 OFFICE O/P EST LOW 20 MIN: CPT | Performed by: PHYSICIAN ASSISTANT

## 2022-12-21 RX ORDER — CARVEDILOL 25 MG/1
TABLET ORAL
COMMUNITY
Start: 2022-12-14

## 2022-12-21 RX ORDER — MELATONIN
1000 DAILY
COMMUNITY
Start: 2022-12-15 | End: 2023-01-15

## 2022-12-21 RX ORDER — LANOLIN ALCOHOL/MO/W.PET/CERES
325 CREAM (GRAM) TOPICAL DAILY
COMMUNITY
Start: 2022-12-15 | End: 2023-01-15

## 2022-12-21 RX ORDER — ISOSORBIDE MONONITRATE 120 MG/1
TABLET, EXTENDED RELEASE ORAL
COMMUNITY
Start: 2022-12-14

## 2022-12-21 NOTE — PROGRESS NOTES
"Chief Complaint  Hospital Follow Up Visit    Subjective        Elzbieta Nunez presents to [facility]  History of Present Illness  Patient is here for follow-up of hospital visit.  He states he has not been feeling any better.  He states he been feeling very tired short of breath and weak.  He states that he has had a good day yesterday but is feeling worse today.  He denies any chest pain.  He states that his shortness of breath is getting progressively worse.  His O2 saturations in the office today are 88%      Objective   Vital Signs:  /58 (BP Location: Left arm, Patient Position: Sitting, Cuff Size: Large Adult)   Pulse 67   SpO2 (!) 88%   Estimated body mass index is 34.87 kg/m² as calculated from the following:    Height as of 12/1/22: 180.3 cm (71\").    Weight as of 12/1/22: 113 kg (250 lb).          Physical Exam  Vitals and nursing note reviewed.   Constitutional:       Appearance: He is ill-appearing and diaphoretic. He is not toxic-appearing.   Pulmonary:      Effort: Respiratory distress present.   Neurological:      Mental Status: He is alert.        Result Review :                Assessment and Plan   Diagnoses and all orders for this visit:    1. Low oxygen saturation (Primary)  Discussion with patient and wife about the need to go on to the ER for further evaluation.  We discussed his low oxygen level as well as his increased fatigue.  This may be due to a lung issue or in fact heart issue and needs to be evaluated for both.  Considering his 2 recent MIs he needs to be seen in the ER today.  2. Shortness of breath             Follow Up   No follow-ups on file.  Patient was given instructions and counseling regarding his condition or for health maintenance advice. Please see specific information pulled into the AVS if appropriate.       "

## 2023-01-16 ENCOUNTER — LAB (OUTPATIENT)
Dept: LAB | Facility: HOSPITAL | Age: 78
End: 2023-01-16
Payer: MEDICARE

## 2023-01-16 ENCOUNTER — TRANSCRIBE ORDERS (OUTPATIENT)
Dept: LAB | Facility: HOSPITAL | Age: 78
End: 2023-01-16
Payer: MEDICARE

## 2023-01-16 DIAGNOSIS — N19 RENAL FAILURE, UNSPECIFIED CHRONICITY: Primary | ICD-10-CM

## 2023-01-16 DIAGNOSIS — N19 RENAL FAILURE, UNSPECIFIED CHRONICITY: ICD-10-CM

## 2023-01-16 LAB
BACTERIA UR QL AUTO: ABNORMAL /HPF
BILIRUB UR QL STRIP: NEGATIVE
CLARITY UR: CLEAR
COLOR UR: YELLOW
GLUCOSE UR STRIP-MCNC: ABNORMAL MG/DL
HGB UR QL STRIP.AUTO: NEGATIVE
HYALINE CASTS UR QL AUTO: ABNORMAL /LPF
KETONES UR QL STRIP: NEGATIVE
LEUKOCYTE ESTERASE UR QL STRIP.AUTO: NEGATIVE
NITRITE UR QL STRIP: NEGATIVE
PH UR STRIP.AUTO: 6 [PH] (ref 5–8)
PROT UR QL STRIP: ABNORMAL
RBC # UR STRIP: ABNORMAL /HPF
REF LAB TEST METHOD: ABNORMAL
SP GR UR STRIP: 1.02 (ref 1–1.03)
SQUAMOUS #/AREA URNS HPF: ABNORMAL /HPF
UROBILINOGEN UR QL STRIP: ABNORMAL
WBC # UR STRIP: ABNORMAL /HPF

## 2023-01-16 PROCEDURE — 85025 COMPLETE CBC W/AUTO DIFF WBC: CPT

## 2023-01-16 PROCEDURE — 36415 COLL VENOUS BLD VENIPUNCTURE: CPT

## 2023-01-16 PROCEDURE — 81001 URINALYSIS AUTO W/SCOPE: CPT

## 2023-01-16 PROCEDURE — 80048 BASIC METABOLIC PNL TOTAL CA: CPT

## 2023-01-17 LAB
ANION GAP SERPL CALCULATED.3IONS-SCNC: 17.4 MMOL/L (ref 5–15)
BASOPHILS # BLD AUTO: 0.03 10*3/MM3 (ref 0–0.2)
BASOPHILS NFR BLD AUTO: 0.5 % (ref 0–1.5)
BUN SERPL-MCNC: 37 MG/DL (ref 8–23)
BUN/CREAT SERPL: 18 (ref 7–25)
CALCIUM SPEC-SCNC: 9.2 MG/DL (ref 8.6–10.5)
CHLORIDE SERPL-SCNC: 101 MMOL/L (ref 98–107)
CO2 SERPL-SCNC: 18.6 MMOL/L (ref 22–29)
CREAT SERPL-MCNC: 2.05 MG/DL (ref 0.76–1.27)
DEPRECATED RDW RBC AUTO: 49.7 FL (ref 37–54)
EGFRCR SERPLBLD CKD-EPI 2021: 33 ML/MIN/1.73
EOSINOPHIL # BLD AUTO: 0.1 10*3/MM3 (ref 0–0.4)
EOSINOPHIL NFR BLD AUTO: 1.5 % (ref 0.3–6.2)
ERYTHROCYTE [DISTWIDTH] IN BLOOD BY AUTOMATED COUNT: 14.8 % (ref 12.3–15.4)
GLUCOSE SERPL-MCNC: 276 MG/DL (ref 65–99)
HCT VFR BLD AUTO: 31.3 % (ref 37.5–51)
HGB BLD-MCNC: 10.6 G/DL (ref 13–17.7)
IMM GRANULOCYTES # BLD AUTO: 0.06 10*3/MM3 (ref 0–0.05)
IMM GRANULOCYTES NFR BLD AUTO: 0.9 % (ref 0–0.5)
LYMPHOCYTES # BLD AUTO: 1.35 10*3/MM3 (ref 0.7–3.1)
LYMPHOCYTES NFR BLD AUTO: 20.4 % (ref 19.6–45.3)
MCH RBC QN AUTO: 31.1 PG (ref 26.6–33)
MCHC RBC AUTO-ENTMCNC: 33.9 G/DL (ref 31.5–35.7)
MCV RBC AUTO: 91.8 FL (ref 79–97)
MONOCYTES # BLD AUTO: 0.37 10*3/MM3 (ref 0.1–0.9)
MONOCYTES NFR BLD AUTO: 5.6 % (ref 5–12)
NEUTROPHILS NFR BLD AUTO: 4.72 10*3/MM3 (ref 1.7–7)
NEUTROPHILS NFR BLD AUTO: 71.1 % (ref 42.7–76)
NRBC BLD AUTO-RTO: 0 /100 WBC (ref 0–0.2)
PLATELET # BLD AUTO: 280 10*3/MM3 (ref 140–450)
PMV BLD AUTO: 10.9 FL (ref 6–12)
POTASSIUM SERPL-SCNC: 5.4 MMOL/L (ref 3.5–5.2)
RBC # BLD AUTO: 3.41 10*6/MM3 (ref 4.14–5.8)
SODIUM SERPL-SCNC: 137 MMOL/L (ref 136–145)
WBC NRBC COR # BLD: 6.63 10*3/MM3 (ref 3.4–10.8)

## 2023-02-07 ENCOUNTER — OFFICE VISIT (OUTPATIENT)
Dept: FAMILY MEDICINE CLINIC | Facility: CLINIC | Age: 78
End: 2023-02-07
Payer: MEDICARE

## 2023-02-07 ENCOUNTER — TELEPHONE (OUTPATIENT)
Dept: FAMILY MEDICINE CLINIC | Facility: CLINIC | Age: 78
End: 2023-02-07

## 2023-02-07 VITALS
WEIGHT: 237 LBS | BODY MASS INDEX: 33.18 KG/M2 | HEART RATE: 80 BPM | HEIGHT: 71 IN | DIASTOLIC BLOOD PRESSURE: 82 MMHG | SYSTOLIC BLOOD PRESSURE: 142 MMHG | OXYGEN SATURATION: 96 %

## 2023-02-07 DIAGNOSIS — J10.1 INFLUENZA B: Primary | ICD-10-CM

## 2023-02-07 DIAGNOSIS — R05.1 ACUTE COUGH: ICD-10-CM

## 2023-02-07 LAB
EXPIRATION DATE: 3724
FLUAV AG UPPER RESP QL IA.RAPID: NOT DETECTED
FLUBV AG UPPER RESP QL IA.RAPID: DETECTED
INTERNAL CONTROL: ABNORMAL
Lab: ABNORMAL
SARS-COV-2 AG UPPER RESP QL IA.RAPID: NOT DETECTED

## 2023-02-07 PROCEDURE — 87428 SARSCOV & INF VIR A&B AG IA: CPT | Performed by: STUDENT IN AN ORGANIZED HEALTH CARE EDUCATION/TRAINING PROGRAM

## 2023-02-07 PROCEDURE — 99213 OFFICE O/P EST LOW 20 MIN: CPT | Performed by: STUDENT IN AN ORGANIZED HEALTH CARE EDUCATION/TRAINING PROGRAM

## 2023-02-07 RX ORDER — OSELTAMIVIR PHOSPHATE 75 MG/1
75 CAPSULE ORAL ONCE
Qty: 1 CAPSULE | Refills: 0 | Status: SHIPPED | OUTPATIENT
Start: 2023-02-07 | End: 2023-02-07

## 2023-02-07 RX ORDER — BUMETANIDE 2 MG/1
2 TABLET ORAL DAILY
Qty: 30 TABLET | Refills: 1 | Status: SHIPPED | OUTPATIENT
Start: 2023-02-07

## 2023-02-07 RX ORDER — OSELTAMIVIR PHOSPHATE 30 MG/1
30 CAPSULE ORAL EVERY 12 HOURS SCHEDULED
Qty: 8 CAPSULE | Refills: 0 | Status: SHIPPED | OUTPATIENT
Start: 2023-02-07

## 2023-02-07 NOTE — PROGRESS NOTES
"Chief Complaint  URI (Cough and sore throat and headache x 2 days)    Subjective     {Problem List  Visit Diagnosis   Encounters  Notes  Medications  Labs  Result Review Imaging  Media :23}     Elzbieta Nunez presents to [facility]  History of Present Illness    Objective   Vital Signs:   /82 (BP Location: Left arm, Patient Position: Sitting, Cuff Size: Adult)   Pulse 80   Ht 180.3 cm (71\")   Wt 108 kg (237 lb)   SpO2 96%   BMI 33.05 kg/m²     Body mass index is 33.05 kg/m².    Review of Systems    Past History:  Medical History: has a past medical history of Acute otitis externa, Acute otitis media, Atypical chest pain, Cellulitis, Diabetes mellitus type 1 (HCC), Dysuria, Esophagitis, Gastritis, Gastroenteritis, Glucosuria, Hyperlipidemia, Hypertension, Hypothyroidism, Insulin dependent diabetes mellitus type IA (HCC), Nicotine dependence, Obesity, Penetrating foreign body of skin of index finger, initial encounter, Peripheral vascular disease (HCC), and Sinusitis.   Surgical History: has a past surgical history that includes Leg Biopsy (02/01/2009).   Family History: family history includes Developmental delay (age of onset: 74) in his mother; Diabetes (age of onset: 90) in his father; Hypertension (age of onset: 90) in his father.   Social History: reports that he quit smoking about 13 months ago. His smoking use included cigarettes. He started smoking about 47 years ago. He has a 46.00 pack-year smoking history. He has been exposed to tobacco smoke. He has never used smokeless tobacco. He reports that he does not currently use alcohol. Drug use questions deferred to the physician.      Current Outpatient Medications:   •  allopurinol (ZYLOPRIM) 100 MG tablet, , Disp: , Rfl:   •  amLODIPine (NORVASC) 10 MG tablet, Take 1 tablet by mouth Daily., Disp: 90 tablet, Rfl: 1  •  aspirin 81 MG EC tablet, Take 1 tablet by mouth Daily., Disp: , Rfl:   •  bumetanide (BUMEX) " 2 MG tablet, Take 1 tablet by mouth Daily., Disp: 30 tablet, Rfl: 1  •  carvedilol (COREG) 25 MG tablet, , Disp: , Rfl:   •  chlorthalidone (HYGROTEN) 50 MG tablet, , Disp: , Rfl:   •  citalopram (CeleXA) 20 MG tablet, Take 1 tablet by mouth Daily., Disp: 90 tablet, Rfl: 1  •  clopidogrel (PLAVIX) 75 MG tablet, TAKE ONE TABLET BY MOUTH DAILY, Disp: 90 tablet, Rfl: 1  •  gemfibrozil (LOPID) 600 MG tablet, Take 1 tablet by mouth 2 (Two) Times a Day., Disp: 180 tablet, Rfl: 1  •  hydrALAZINE (APRESOLINE) 100 MG tablet, Take 1 tablet by mouth 3 (Three) Times a Day., Disp: 270 tablet, Rfl: 1  •  insulin aspart protamine-insulin aspart (novoLOG 70/30) injection, Novolog Mix 70-30 U-100 Insulin 100 unit/mL subcutaneous solution, Disp: , Rfl:   •  isosorbide mononitrate (IMDUR) 120 MG 24 hr tablet, , Disp: , Rfl:   •  nicotine (NICODERM CQ) 21 MG/24HR patch, Place 1 patch on the skin as directed by provider Daily., Disp: 30 each, Rfl: 1  •  pantoprazole (PROTONIX) 40 MG EC tablet, Take 1 tablet by mouth Daily., Disp: 90 tablet, Rfl: 1  •  rosuvastatin (CRESTOR) 20 MG tablet, Take 1 tablet by mouth Daily., Disp: 90 tablet, Rfl: 1    Allergies: Moxifloxacin, Niacin, Oxycodone hcl, and Oxycodone-acetaminophen    Physical Exam     Result Review :{Labs  Result Review  Imaging  Med Tab  Media  Procedures :23}   {The following data was reviewed by (Optional):51110}  {Ambulatory Labs (Optional):60018}  {Data reviewed (Optional):83678:::1}            Assessment and Plan {CC Problem List  Visit Diagnosis   ROS  Review (Popup)  Health Maintenance  Quality  BestPractice  Medications  SmartSets  SnapShot Encounters  Media :23}   There are no diagnoses linked to this encounter.  {Time Spent (Optional):82513}  Follow Up {Instructions Charge Capture  Follow-up Communications :23}  No follow-ups on file.  Patient was given instructions and counseling regarding his condition or for health maintenance advice. Please see  specific information pulled into the AVS if appropriate.     Divina Delgado, DO

## 2023-02-07 NOTE — TELEPHONE ENCOUNTER
Pharmacy Name: [facility] [id number] [facility] [address] - [phone] [facility] [phone]      Pharmacy representative name: TC    Pharmacy representative phone number: [phone]    What medication are you calling in regards to:    oseltamivir (Tamiflu) 30 MG capsule      oseltamivir (Tamiflu) 30 MG capsule       What question does the pharmacy have: PHARMACY STATED THAT THEY CAN FILL THE TAMIFLU IN LIQUID, OR XOFLUZA IS ANOTHER OPTION, AND IT WILL NOT HAVE A PROBLEM WITH HIS RENAL ISSUES.    Who is the provider that prescribed the medication: JOSÉ LUIS    Additional notes: NA

## 2023-02-07 NOTE — PROGRESS NOTES
"Chief Complaint  URI (Cough and sore throat and headache x 2 days)    Stefan Orantesly presents to [facility] PRIMARY [facility]ARE  URI   This is a new problem. The current episode started yesterday. The problem has been gradually worsening. There has been no fever. Associated symptoms include congestion, coughing, headaches, rhinorrhea and a sore throat. He has tried acetaminophen and antihistamine for the symptoms.       Objective   Vital Signs:   /82 (BP Location: Left arm, Patient Position: Sitting, Cuff Size: Adult)   Pulse 80   Ht 180.3 cm (71\")   Wt 108 kg (237 lb)   SpO2 96%   BMI 33.05 kg/m²     Body mass index is 33.05 kg/m².    Review of Systems   HENT: Positive for congestion, rhinorrhea and sore throat.    Respiratory: Positive for cough.        Past History:  Medical History: has a past medical history of Acute otitis externa, Acute otitis media, Atypical chest pain, Cellulitis, Diabetes mellitus type 1 (HCC), Dysuria, Esophagitis, Gastritis, Gastroenteritis, Glucosuria, Hyperlipidemia, Hypertension, Hypothyroidism, Insulin dependent diabetes mellitus type IA (HCC), Nicotine dependence, Obesity, Penetrating foreign body of skin of index finger, initial encounter, Peripheral vascular disease (HCC), and Sinusitis.   Surgical History: has a past surgical history that includes Leg Biopsy (02/01/2009).   Family History: family history includes Developmental delay (age of onset: 74) in his mother; Diabetes (age of onset: 90) in his father; Hypertension (age of onset: 90) in his father.   Social History: reports that he quit smoking about 13 months ago. His smoking use included cigarettes. He started smoking about 47 years ago. He has a 46.00 pack-year smoking history. He has been exposed to tobacco smoke. He has never used smokeless tobacco. He reports that he does not currently use alcohol. Drug use questions deferred to the physician.      Current Outpatient " Medications:   •  bumetanide (BUMEX) 2 MG tablet, Take 1 tablet by mouth Daily., Disp: 30 tablet, Rfl: 1  •  allopurinol (ZYLOPRIM) 100 MG tablet, , Disp: , Rfl:   •  amLODIPine (NORVASC) 10 MG tablet, Take 1 tablet by mouth Daily., Disp: 90 tablet, Rfl: 1  •  aspirin 81 MG EC tablet, Take 1 tablet by mouth Daily., Disp: , Rfl:   •  carvedilol (COREG) 25 MG tablet, , Disp: , Rfl:   •  chlorthalidone (HYGROTEN) 50 MG tablet, , Disp: , Rfl:   •  citalopram (CeleXA) 20 MG tablet, Take 1 tablet by mouth Daily., Disp: 90 tablet, Rfl: 1  •  clopidogrel (PLAVIX) 75 MG tablet, TAKE ONE TABLET BY MOUTH DAILY, Disp: 90 tablet, Rfl: 1  •  gemfibrozil (LOPID) 600 MG tablet, Take 1 tablet by mouth 2 (Two) Times a Day., Disp: 180 tablet, Rfl: 1  •  hydrALAZINE (APRESOLINE) 100 MG tablet, Take 1 tablet by mouth 3 (Three) Times a Day., Disp: 270 tablet, Rfl: 1  •  insulin aspart protamine-insulin aspart (novoLOG 70/30) injection, Novolog Mix 70-30 U-100 Insulin 100 unit/mL subcutaneous solution, Disp: , Rfl:   •  isosorbide mononitrate (IMDUR) 120 MG 24 hr tablet, , Disp: , Rfl:   •  nicotine (NICODERM CQ) 21 MG/24HR patch, Place 1 patch on the skin as directed by provider Daily., Disp: 30 each, Rfl: 1  •  oseltamivir (Tamiflu) 30 MG capsule, Take 1 capsule by mouth Every 12 (Twelve) Hours., Disp: 8 capsule, Rfl: 0  •  oseltamivir (Tamiflu) 75 MG capsule, Take 1 capsule by mouth 1 (One) Time for 1 dose. To take 75mg on day 1 then 30 mg BID for remaining of 5 days., Disp: 1 capsule, Rfl: 0  •  pantoprazole (PROTONIX) 40 MG EC tablet, Take 1 tablet by mouth Daily., Disp: 90 tablet, Rfl: 1  •  rosuvastatin (CRESTOR) 20 MG tablet, Take 1 tablet by mouth Daily., Disp: 90 tablet, Rfl: 1    Allergies: Moxifloxacin, Niacin, Oxycodone hcl, and Oxycodone-acetaminophen    Physical Exam  Constitutional:       General: He is not in acute distress.     Appearance: He is not ill-appearing or toxic-appearing.   HENT:      Head: Normocephalic and  atraumatic.      Right Ear: Ear canal and external ear normal.      Left Ear: Ear canal and external ear normal.      Nose: Congestion and rhinorrhea present.   Eyes:      General: No scleral icterus.  Cardiovascular:      Rate and Rhythm: Normal rate and regular rhythm.   Pulmonary:      Effort: Pulmonary effort is normal.      Breath sounds: Normal breath sounds.   Neurological:      Mental Status: He is alert.          Result Review :                   Assessment and Plan    Diagnoses and all orders for this visit:    1. Influenza B (Primary)    2. Acute cough  -     POCT SARS-CoV-2 Antigen BOBO + Flu    Other orders  -     oseltamivir (Tamiflu) 30 MG capsule; Take 1 capsule by mouth Every 12 (Twelve) Hours.  Dispense: 8 capsule; Refill: 0  -     oseltamivir (Tamiflu) 75 MG capsule; Take 1 capsule by mouth 1 (One) Time for 1 dose. To take 75mg on day 1 then 30 mg BID for remaining of 5 days.  Dispense: 1 capsule; Refill: 0  -     bumetanide (BUMEX) 2 MG tablet; Take 1 tablet by mouth Daily.  Dispense: 30 tablet; Refill: 1    Flu positive. Will do tamiflu and load with 75mg then 30 mg BID for remaining days. Tylenol for pain/fever. OTC cough and cold medication for symptoms. Nasal saline spray recommended. Cool mist humidifier at the bedside. RTC with new or worsening symptoms.      Follow Up   No follow-ups on file.  Patient was given instructions and counseling regarding his condition or for health maintenance advice. Please see specific information pulled into the AVS if appropriate.     Divina Delgado, DO

## 2023-03-01 ENCOUNTER — OFFICE VISIT (OUTPATIENT)
Dept: ENDOCRINOLOGY | Facility: CLINIC | Age: 78
End: 2023-03-01
Payer: MEDICARE

## 2023-03-01 VITALS
HEIGHT: 71 IN | WEIGHT: 232 LBS | HEART RATE: 74 BPM | SYSTOLIC BLOOD PRESSURE: 170 MMHG | OXYGEN SATURATION: 97 % | DIASTOLIC BLOOD PRESSURE: 68 MMHG | BODY MASS INDEX: 32.48 KG/M2

## 2023-03-01 DIAGNOSIS — Z79.4 TYPE 2 DIABETES MELLITUS WITH STAGE 4 CHRONIC KIDNEY DISEASE, WITH LONG-TERM CURRENT USE OF INSULIN: Primary | ICD-10-CM

## 2023-03-01 DIAGNOSIS — N18.4 TYPE 2 DIABETES MELLITUS WITH STAGE 4 CHRONIC KIDNEY DISEASE, WITH LONG-TERM CURRENT USE OF INSULIN: Primary | ICD-10-CM

## 2023-03-01 DIAGNOSIS — E11.22 TYPE 2 DIABETES MELLITUS WITH STAGE 4 CHRONIC KIDNEY DISEASE, WITH LONG-TERM CURRENT USE OF INSULIN: Primary | ICD-10-CM

## 2023-03-01 PROBLEM — E11.65 UNCONTROLLED TYPE 2 DIABETES MELLITUS WITH HYPERGLYCEMIA: Status: RESOLVED | Noted: 2022-04-20 | Resolved: 2023-03-01

## 2023-03-01 PROCEDURE — 99205 OFFICE O/P NEW HI 60 MIN: CPT | Performed by: HOSPITALIST

## 2023-03-01 RX ORDER — PROCHLORPERAZINE 25 MG/1
1 SUPPOSITORY RECTAL TAKE AS DIRECTED
Qty: 1 EACH | Refills: 3 | Status: SHIPPED | OUTPATIENT
Start: 2023-03-01

## 2023-03-01 RX ORDER — SEMAGLUTIDE 1.34 MG/ML
1 INJECTION, SOLUTION SUBCUTANEOUS
Qty: 3 ML | Refills: 5 | Status: SHIPPED | OUTPATIENT
Start: 2023-03-01

## 2023-03-01 RX ORDER — PROCHLORPERAZINE 25 MG/1
1 SUPPOSITORY RECTAL ONCE
Qty: 1 EACH | Refills: 0 | Status: SHIPPED | OUTPATIENT
Start: 2023-03-01 | End: 2023-03-01

## 2023-03-01 RX ORDER — INSULIN ASPART 100 [IU]/ML
INJECTION, SOLUTION INTRAVENOUS; SUBCUTANEOUS
Qty: 30 ML | Refills: 11 | Status: SHIPPED | OUTPATIENT
Start: 2023-03-01

## 2023-03-01 RX ORDER — SEMAGLUTIDE 1.34 MG/ML
1 INJECTION, SOLUTION SUBCUTANEOUS
COMMUNITY
Start: 2023-02-27 | End: 2023-03-01 | Stop reason: SDUPTHER

## 2023-03-01 RX ORDER — PROCHLORPERAZINE 25 MG/1
1 SUPPOSITORY RECTAL TAKE AS DIRECTED
Qty: 3 EACH | Refills: 11 | Status: SHIPPED | OUTPATIENT
Start: 2023-03-01

## 2023-03-01 RX ORDER — HUMAN INSULIN 100 [IU]/ML
INJECTION, SUSPENSION SUBCUTANEOUS
COMMUNITY
End: 2023-03-01

## 2023-03-01 RX ORDER — PEN NEEDLE, DIABETIC 31 GX5/16"
NEEDLE, DISPOSABLE MISCELLANEOUS
Qty: 200 EACH | Refills: 11 | Status: SHIPPED | OUTPATIENT
Start: 2023-03-01

## 2023-03-01 RX ORDER — INSULIN DEGLUDEC 200 U/ML
INJECTION, SOLUTION SUBCUTANEOUS
Qty: 30 ML | Refills: 11 | Status: SHIPPED | OUTPATIENT
Start: 2023-03-01

## 2023-03-01 NOTE — ASSESSMENT & PLAN NOTE
-Diabetes is currently uncontrolled due to hyperglycemia  -Patient with complications of neuropathy, CAD and CKD 4  -Patient is extremely high risk for complications due to known complications and persistent hyperglycemia, counseled that long term hyperglycemia can cause worsening neuropathy, kidney damage, eye damage, and cardiovascular disease  -Patient with some confusion regarding his current medications due to multiple changes over the past 4 to 6 months during hospitalizations  -Recommend patient stop 70/30 insulin and start MDI  -Recommend patient start CGM with Dexcom G6  -We will order Tresiba U200 and Novolog U100 along with Dexcom G6; recommend he bring these to his diabetes educators visit to start at that time  -We will start Tresiba U200 60 units once daily and NovoLog U100 20 units with meals/10 units with snacks with plans to add a correction factor after next visit  -At this time Jardiance 10 mg daily is unlikely to have significant benefit to his glycemic control due to his reduced kidney function however if he wants to continue taking it for renal protection in the setting of CKD that would be okay and at the discretion of nephrology  -We will plan to stop 70/30 insulin once he starts Tresiba/NovoLog  -Recommend checking fingerstick blood glucose 3-4 times per day until he receives his Dexcom G6  -Intolerant to ACEi/ARB due to CKD with hyperkalemia; blood pressure today 170/68, recommend he call his cardiologist office to see which prescription they were planning to add in when he can pick it up at the pharmacy as his blood pressure is uncontrolled today    DM Health Maintenance:  Ophtho: has exam in 3 weeks, has glaucoma (no known retinopathy per his report today)   Monofilament / Foot exam: Completed today with significant callus burden on bilateral heels, recommend he continue to follow with podiatry  Lipids/Statin: taking rosuvastatin 20mg daily and gemfibrozil with last FLP showing LDL 95, TC  158, HDL 33,  done 11/23/2022; recommend he discuss with primary care physician combination therapy of statin and gemfibrozil as it is generally not advised and once his kidney function declines to an eGFR < 30 it is recommended to dose reduce rosuvastatin to 10 mg daily and stop gemfibrozil; patient was unclear if he is still taking both of these medications and would like to follow-up with his primary care physician regarding his medications  GABRIEL: not indicated as he is being followed for CKD 4   TSH: 1.28 done 12/10/2022  Aspirin: not taking as he takes plavix

## 2023-03-29 ENCOUNTER — APPOINTMENT (OUTPATIENT)
Dept: DIABETES SERVICES | Facility: HOSPITAL | Age: 78
End: 2023-03-29
Payer: MEDICARE

## 2023-03-29 ENCOUNTER — DOCUMENTATION (OUTPATIENT)
Dept: DIABETES SERVICES | Facility: HOSPITAL | Age: 78
End: 2023-03-29
Payer: MEDICARE

## 2023-03-29 PROCEDURE — G0108 DIAB MANAGE TRN  PER INDIV: HCPCS | Performed by: REGISTERED NURSE

## 2023-03-29 NOTE — PLAN OF CARE
Patient presents today, along with his wife, for outpatient diabetes education. 60 minutes of face to face time was spent. He did not have his Dexcom with him today. I did review the concept of CGM and a demo was used. I was able to problem solve the issue with his needed supply and this should be resolved soon. I encouraged them to contact this office for a quick visit to assist with the application of the device. He will be using a  as his current phone does not support the device. Insulin education was also provided as he was changed from mixed insulin to MDI with basal bolus. Thank you for this opportunity.

## 2023-05-01 NOTE — PROGRESS NOTES
Chief complaint/Reason for consult: T2DM     Patient is joined by his wife who provides additional history    HPI: Mr. Nunez is a 78yoM with T2DM, hypertension, hyperlipidemia, PVD and GERD who comes for follow-up of T2DM. Since lat visit on 3/1/2023 he reports significant improvement in his glycemic control.     # [id number], controlled with complications  # CKD 4   # CAD with heart failure   - Diagnosed: ~1995 years ago   - Current regimen includes:  Ozempic 1 mg weekly, Jardiance 10 mg daily, Tresiba (U-200) 60u once daily and Novolog 10-20u with meals depending on what he eats   - On Jardiance 10mg daily for renal protection   - Denies nausea, vomiting, abdominal pain; has occasional mild constipation  - Compliance with medication is fair although he has recently had trouble filling his medications so has missed a couple of doses of Jardiance  - HbA1c:  6.6% today, 8.0% on 12/1/2022 (unreliable due to anemia)   - Using Dexcom G6    CGM Download  -Dates reviewed: 4/20/2023-5/3/2023  -Data: 93% wear time, average glucose 170 mg/dL, standard deviation 42 mg/dL, GMI 7.4%, 3% very high, 35% high, 61% time in range, less than 1% low and less than 1% very low  -Interpretation: Mild transient hyperglycemia after meals, rare hypoglycemia after breakfast, just prior to 12 PM    - Takes prednisone for gout occasionally, none recently  - Hypoglycemia occurs  rarely, typically after breakfast  - Patient has symptoms with lows   - Hyperglycemia occurs after meals  - Patient reports neuropathy that is stable in hands and feet   - Patient denies gastroparesis   - Patient reports rotating injection sites, uses abdomen  - Patient with known ASCVD   - intolerant to ACEi/ARB due to CKD with hyperkalemia; blood pressure today 142/70     DM Health Maintenance:  Ophtho: 5/2/2023 showing primary open-angle glaucoma, mild nonproliferative diabetic retinopathy, age-related cataract of the right eye and posterior vitreous  "detachment  Monofilament / Foot exam:  Completed 3/1/2023  Lipids/Statin: taking rosuvastatin 20mg daily with last FLP showing LDL 95, , HDL 33,  done 11/23/2022   GABRIEL: not indicated as he is being followed for CKD 4   TSH: 1.28 done 12/10/2022  Aspirin: not taking as he takes plavix     # Mixed hyperlipidemia   - Currently taking gemfibrozil 600 mg twice daily and rosuvastatin 20 mg daily  - FLP done 11/23/2022 showed LDL 95, , HDL 33,    - Following with PCP for this  - Has known CKD IV      Past medical history, past surgical history, family history and social history reviewed within this encounter.     Review of Systems   Constitutional: Negative for appetite change and unexpected weight change.   HENT: Negative for trouble swallowing.    Eyes: Positive for visual disturbance.   Respiratory: Negative for shortness of breath.    Cardiovascular: Negative for chest pain.   Gastrointestinal: Positive for constipation. Negative for abdominal pain, diarrhea and nausea.   Endocrine: Negative for cold intolerance and heat intolerance.   Genitourinary: Negative for difficulty urinating.   Musculoskeletal: Positive for arthralgias. Negative for myalgias.   Skin: Negative for rash.   Neurological: Positive for numbness.   Psychiatric/Behavioral: Negative for agitation and confusion.        /70   Pulse 76   Ht 180.3 cm (71\")   Wt 109 kg (241 lb)   SpO2 98%   BMI 33.61 kg/m²      Physical Exam  Vitals reviewed.   Constitutional:       General: He is not in acute distress.     Appearance: He is obese.   HENT:      Head: Normocephalic.   Eyes:      Conjunctiva/sclera: Conjunctivae normal.   Cardiovascular:      Rate and Rhythm: Normal rate.   Pulmonary:      Effort: Pulmonary effort is normal.   Abdominal:      Tenderness: There is no guarding.   Musculoskeletal:         General: Normal range of motion.   Skin:     General: Skin is warm.   Neurological:      Mental Status: He is alert and " oriented to person, place, and time.   Psychiatric:         Mood and Affect: Mood normal.         Behavior: Behavior normal.         Thought Content: Thought content normal.        Labs and images reviewed as noted in the HPI    Assessment and plan:    Diagnoses and all orders for this visit:    1. Type 2 diabetes mellitus with stage 4 chronic kidney disease, with long-term current use of insulin (Primary)  Assessment & Plan:  -Patient with good glycemic control today given his age and comorbidities  -Recommend a slightly more lenient glycemic goal due to age and comorbidities with hemoglobin A1c less than 7.5%  -Due to CKD 4 hemoglobin A1c may not be the most accurate measurement, Dexcom GMI 7.4% over the past 2 weeks  -Recommend continuing Jardiance 10 mg daily for renal protection, it is unlikely to have a significant impact on his glycemic control due to reduced GFR  -Recommend increasing Ozempic to 2 mg weekly to assist with weight loss and slightly improved glycemic control  -Continue Tresiba U200 60 units daily; if patient has more frequent hypoglycemia recommend reducing to 55 units daily  -Continue NovoLog 10-20u with meals depending on what he eats; if patient has more frequent hypoglycemia recommend reducing dose to 10-15 units with meals  -Continue Dexcom G6 CGM  -Intolerant to ACEi/ARB due to CKD with hyperkalemia; blood pressure today 142/70     DM Health Maintenance:  Ophtho: 5/2/2023 showing primary open-angle glaucoma, mild nonproliferative diabetic retinopathy, age-related cataract of the right eye and posterior vitreous detachment  Monofilament / Foot exam:  Completed 3/1/2023  Lipids/Statin: taking rosuvastatin 20mg daily with last FLP showing LDL 95, , HDL 33,  done 11/23/2022   GABRIEL: not indicated as he is being followed for CKD 4   TSH: 1.28 done 12/10/2022  Aspirin: not taking as he takes plavix     Orders:  -     POC Glycosylated Hemoglobin (Hb A1C)  -     Semaglutide, 2 MG/DOSE,  (Ozempic, 2 MG/DOSE,) 8 MG/3ML solution pen-injector; Inject 2 mg under the skin into the appropriate area as directed 1 (One) Time Per Week.  Dispense: 3 mL; Refill: 5  -     Insulin Degludec (Tresiba FlexTouch) 200 UNIT/ML solution pen-injector pen injection; Inject 60u once daily, titrate for max daily dose 100u  Dispense: 30 mL; Refill: 11  -     Insulin Pen Needle (B-D ULTRAFINE III SHORT PEN) 31G X 8 MM misc; Use as directed up to 6 times per day  Dispense: 200 each; Refill: 11  -     insulin aspart (NovoLOG FlexPen) 100 UNIT/ML solution pen-injector sc pen; Inject 10-20u three times daily with meals, titrate for max daily dose 100u daily  Dispense: 30 mL; Refill: 11  -     empagliflozin (JARDIANCE) 10 MG tablet tablet; Take 1 tablet by mouth Daily.  Dispense: 30 tablet; Refill: 11  -     Continuous Blood Gluc Sensor (Dexcom G6 Sensor); 1 each Take As Directed.  Dispense: 3 each; Refill: 11    2. Mixed hyperlipidemia  Assessment & Plan:  -Patient continues to want to follow with primary care physician for management of this  -Recommend reducing rosuvastatin to 10 mg daily given renal function         Return in about 3 months (around 8/3/2023) for T2DM .     I spent 30 minutes caring for Elzbieta on this date of service. This time includes time spent by me in the following activities: preparing for the visit, reviewing tests, obtaining and/or reviewing a separately obtained history, performing a medically appropriate examination and/or evaluation, counseling and educating the patient/family/caregiver, ordering medications, tests, or procedures and documenting information in the medical record I spent 5 minutes on the separately reported service of CGM download and interpretation. This time is not included in the time used to support the E/M service also reported today.      Electronically signed by: Kyle S Rosenstein, MD

## 2023-05-03 ENCOUNTER — OFFICE VISIT (OUTPATIENT)
Dept: FAMILY MEDICINE CLINIC | Facility: CLINIC | Age: 78
End: 2023-05-03
Payer: MEDICARE

## 2023-05-03 ENCOUNTER — REFERRAL TRIAGE (OUTPATIENT)
Dept: CASE MANAGEMENT | Facility: OTHER | Age: 78
End: 2023-05-03
Payer: MEDICARE

## 2023-05-03 ENCOUNTER — OFFICE VISIT (OUTPATIENT)
Dept: ENDOCRINOLOGY | Facility: CLINIC | Age: 78
End: 2023-05-03
Payer: MEDICARE

## 2023-05-03 VITALS
SYSTOLIC BLOOD PRESSURE: 142 MMHG | HEART RATE: 76 BPM | DIASTOLIC BLOOD PRESSURE: 70 MMHG | OXYGEN SATURATION: 98 % | WEIGHT: 241 LBS | HEIGHT: 71 IN | BODY MASS INDEX: 33.74 KG/M2

## 2023-05-03 VITALS
OXYGEN SATURATION: 96 % | DIASTOLIC BLOOD PRESSURE: 70 MMHG | BODY MASS INDEX: 33.47 KG/M2 | WEIGHT: 240 LBS | HEART RATE: 77 BPM | SYSTOLIC BLOOD PRESSURE: 146 MMHG

## 2023-05-03 DIAGNOSIS — N18.30 CKD STAGE 3 DUE TO TYPE 2 DIABETES MELLITUS: ICD-10-CM

## 2023-05-03 DIAGNOSIS — E78.2 MIXED HYPERLIPIDEMIA: ICD-10-CM

## 2023-05-03 DIAGNOSIS — D53.9 MACROCYTIC ANEMIA: Primary | ICD-10-CM

## 2023-05-03 DIAGNOSIS — Z79.4 TYPE 2 DIABETES MELLITUS WITH STAGE 4 CHRONIC KIDNEY DISEASE, WITH LONG-TERM CURRENT USE OF INSULIN: Primary | ICD-10-CM

## 2023-05-03 DIAGNOSIS — N18.4 TYPE 2 DIABETES MELLITUS WITH STAGE 4 CHRONIC KIDNEY DISEASE, WITH LONG-TERM CURRENT USE OF INSULIN: Primary | ICD-10-CM

## 2023-05-03 DIAGNOSIS — E11.22 TYPE 2 DIABETES MELLITUS WITH STAGE 4 CHRONIC KIDNEY DISEASE, WITH LONG-TERM CURRENT USE OF INSULIN: Primary | ICD-10-CM

## 2023-05-03 DIAGNOSIS — K21.9 GASTROESOPHAGEAL REFLUX DISEASE, UNSPECIFIED WHETHER ESOPHAGITIS PRESENT: ICD-10-CM

## 2023-05-03 DIAGNOSIS — E11.22 CKD STAGE 3 DUE TO TYPE 2 DIABETES MELLITUS: ICD-10-CM

## 2023-05-03 DIAGNOSIS — I10 PRIMARY HYPERTENSION: ICD-10-CM

## 2023-05-03 DIAGNOSIS — Z12.5 PROSTATE CANCER SCREENING: ICD-10-CM

## 2023-05-03 LAB
EXPIRATION DATE: NORMAL
HBA1C MFR BLD: 6.6 %
Lab: NORMAL

## 2023-05-03 RX ORDER — INSULIN ASPART 100 [IU]/ML
INJECTION, SOLUTION INTRAVENOUS; SUBCUTANEOUS
Qty: 30 ML | Refills: 11 | Status: SHIPPED | OUTPATIENT
Start: 2023-05-03

## 2023-05-03 RX ORDER — PROCHLORPERAZINE 25 MG/1
1 SUPPOSITORY RECTAL TAKE AS DIRECTED
Qty: 3 EACH | Refills: 11 | Status: SHIPPED | OUTPATIENT
Start: 2023-05-03

## 2023-05-03 RX ORDER — PANTOPRAZOLE SODIUM 40 MG/1
40 TABLET, DELAYED RELEASE ORAL DAILY
Qty: 90 TABLET | Refills: 1 | Status: SHIPPED | OUTPATIENT
Start: 2023-05-03

## 2023-05-03 RX ORDER — PEN NEEDLE, DIABETIC 31 GX5/16"
NEEDLE, DISPOSABLE MISCELLANEOUS
Qty: 200 EACH | Refills: 11 | Status: SHIPPED | OUTPATIENT
Start: 2023-05-03

## 2023-05-03 RX ORDER — INSULIN DEGLUDEC 200 U/ML
INJECTION, SOLUTION SUBCUTANEOUS
Qty: 30 ML | Refills: 11 | Status: SHIPPED | OUTPATIENT
Start: 2023-05-03

## 2023-05-03 RX ORDER — SEMAGLUTIDE 2.68 MG/ML
2 INJECTION, SOLUTION SUBCUTANEOUS WEEKLY
Qty: 3 ML | Refills: 5 | Status: SHIPPED | OUTPATIENT
Start: 2023-05-03

## 2023-05-03 NOTE — ASSESSMENT & PLAN NOTE
-Patient continues to want to follow with primary care physician for management of this  -Recommend reducing rosuvastatin to 10 mg daily given renal function

## 2023-05-03 NOTE — PROGRESS NOTES
Chief Complaint  No chief complaint on file.    Stefan Nunez presents to [facility]  History of Present Illness    Patient states that he has been having trouble wth getting his medications due to cost. He states that the jardiance and ozempic are costing him a significant portion of his money for medications. He states that his blood work at his endocrinology has been watching his glucose and it has been running good.    He states that he is not sure which medications he has been on and what he is taking for what reasons.  Patient states that he would like to discuss all of his medications at this time.    He denies any side effects from any of his medications at this time    Patient would like to know specifically if he needs to stay on ferrous sulfate/Gemfibrozil.       Objective   Vital Signs:   /70   Pulse 77   Wt 109 kg (240 lb)   SpO2 96%   BMI 33.47 kg/m²     Body mass index is 33.47 kg/m².    Review of Systems    Past History:  Medical History: has a past medical history of Acute otitis externa, Acute otitis media, Atypical chest pain, Cellulitis, Diabetes mellitus type 1, Dysuria, Esophagitis, Gastritis, Gastroenteritis, Glucosuria, Hyperlipidemia, Hypertension, Hypothyroidism, Insulin dependent diabetes mellitus type IA, Nicotine dependence, Obesity, Penetrating foreign body of skin of index finger, initial encounter, Peripheral vascular disease, and Sinusitis.   Surgical History: has a past surgical history that includes Leg Biopsy (02/01/2009).   Family History: family history includes Developmental delay (age of onset: 74) in his mother; Diabetes (age of onset: 90) in his father; Hypertension (age of onset: 90) in his father.   Social History: reports that he quit smoking about 16 months ago. His smoking use included cigarettes. He started smoking about 47 years ago. He has a 46.00 pack-year smoking history. He has been exposed to tobacco  smoke. He has never used smokeless tobacco. He reports that he does not currently use alcohol. Drug use questions deferred to the physician.      Current Outpatient Medications:   •  allopurinol (ZYLOPRIM) 100 MG tablet, , Disp: , Rfl:   •  amLODIPine (NORVASC) 10 MG tablet, Take 1 tablet by mouth Daily., Disp: 90 tablet, Rfl: 1  •  bumetanide (BUMEX) 2 MG tablet, Take 1 tablet by mouth Daily., Disp: 30 tablet, Rfl: 1  •  carvedilol (COREG) 25 MG tablet, , Disp: , Rfl:   •  chlorthalidone (HYGROTEN) 50 MG tablet, , Disp: , Rfl:   •  clopidogrel (PLAVIX) 75 MG tablet, TAKE ONE TABLET BY MOUTH DAILY, Disp: 90 tablet, Rfl: 1  •  Continuous Blood Gluc Sensor (Dexcom G6 Sensor), 1 each Take As Directed., Disp: 3 each, Rfl: 11  •  Continuous Blood Gluc Transmit (Dexcom G6 Transmitter) misc, 1 each Take As Directed., Disp: 1 each, Rfl: 3  •  empagliflozin (JARDIANCE) 10 MG tablet tablet, Take 1 tablet by mouth Daily., Disp: 30 tablet, Rfl: 11  •  gemfibrozil (LOPID) 600 MG tablet, Take 1 tablet by mouth 2 (Two) Times a Day., Disp: 180 tablet, Rfl: 1  •  hydrALAZINE (APRESOLINE) 100 MG tablet, Take 1 tablet by mouth 3 (Three) Times a Day., Disp: 270 tablet, Rfl: 1  •  insulin aspart (NovoLOG FlexPen) 100 UNIT/ML solution pen-injector sc pen, Inject 10-20u three times daily with meals, titrate for max daily dose 100u daily, Disp: 30 mL, Rfl: 11  •  Insulin Degludec (Tresiba FlexTouch) 200 UNIT/ML solution pen-injector pen injection, Inject 60u once daily, titrate for max daily dose 100u, Disp: 30 mL, Rfl: 11  •  Insulin Pen Needle (B-D ULTRAFINE III SHORT PEN) 31G X 8 MM misc, Use as directed up to 6 times per day, Disp: 200 each, Rfl: 11  •  isosorbide mononitrate (IMDUR) 120 MG 24 hr tablet, , Disp: , Rfl:   •  pantoprazole (PROTONIX) 40 MG EC tablet, Take 1 tablet by mouth Daily., Disp: 90 tablet, Rfl: 1  •  rosuvastatin (CRESTOR) 20 MG tablet, Take 1 tablet by mouth Daily., Disp: 90 tablet, Rfl: 1  •  Semaglutide, 2  MG/DOSE, (Ozempic, 2 MG/DOSE,) 8 MG/3ML solution pen-injector, Inject 2 mg under the skin into the appropriate area as directed 1 (One) Time Per Week., Disp: 3 mL, Rfl: 5  •  citalopram (CeleXA) 20 MG tablet, Take 1 tablet by mouth Daily. (Patient not taking: Reported on 5/3/2023), Disp: 90 tablet, Rfl: 1    Allergies: Moxifloxacin, Niacin, Oxycodone hcl, and Oxycodone-acetaminophen    Physical Exam  Constitutional:       General: He is not in acute distress.     Appearance: He is not ill-appearing or toxic-appearing.   HENT:      Head: Normocephalic and atraumatic.   Cardiovascular:      Rate and Rhythm: Normal rate and regular rhythm.      Heart sounds: No murmur heard.  Pulmonary:      Effort: Pulmonary effort is normal. No respiratory distress.   Neurological:      General: No focal deficit present.      Mental Status: He is alert and oriented to person, place, and time.   Psychiatric:         Mood and Affect: Mood normal.         Thought Content: Thought content normal.          Result Review :                   Assessment and Plan    Diagnoses and all orders for this visit:    1. Macrocytic anemia (Primary)  -     CBC & Differential; Future  -     Ambulatory Referral to Case Management Medication Adherence, Disease Education  -     Iron Profile; Future  -     Ferritin; Future    2. Primary hypertension  -     Comprehensive Metabolic Panel; Future  -     TSH; Future  -     T4, Free; Future  -     Ambulatory Referral to Case Management Medication Adherence, Disease Education    3. Mixed hyperlipidemia  -     Lipid Panel; Future  -     Ambulatory Referral to Case Management Medication Adherence, Disease Education    4. Prostate cancer screening  -     PSA SCREENING; Future    5. CKD stage 3 due to type 2 diabetes mellitus  -     Ambulatory Referral to Case Management Medication Adherence, Disease Education    6. Gastroesophageal reflux disease, unspecified whether esophagitis present  -     Ambulatory Referral to  Case Management Medication Adherence, Disease Education  -     pantoprazole (PROTONIX) 40 MG EC tablet; Take 1 tablet by mouth Daily.  Dispense: 90 tablet; Refill: 1    Will refer to case management to discuss options for medications management.     Blood work ordered and will contact with results when available. Will adjust medications based on abnormalities seen.     Reviewed medications with patient and advised that if he has any questions he should contact the office.  I spent 35 minutes caring for Elzbieta on this date of service. This time includes time spent by me in the following activities:preparing for the visit, obtaining and/or reviewing a separately obtained history, performing a medically appropriate examination and/or evaluation , counseling and educating the patient/family/caregiver, ordering medications, tests, or procedures, referring and communicating with other health care professionals  and documenting information in the medical record  Follow Up   No follow-ups on file.  Patient was given instructions and counseling regarding his condition or for health maintenance advice. Please see specific information pulled into the AVS if appropriate.     Divina Delgado, DO

## 2023-05-03 NOTE — ASSESSMENT & PLAN NOTE
-Patient with good glycemic control today given his age and comorbidities  -Recommend a slightly more lenient glycemic goal due to age and comorbidities with hemoglobin A1c less than 7.5%  -Due to CKD 4 hemoglobin A1c may not be the most accurate measurement, Dexcom GMI 7.4% over the past 2 weeks  -Recommend continuing Jardiance 10 mg daily for renal protection, it is unlikely to have a significant impact on his glycemic control due to reduced GFR  -Recommend increasing Ozempic to 2 mg weekly to assist with weight loss and slightly improved glycemic control  -Continue Tresiba U200 60 units daily; if patient has more frequent hypoglycemia recommend reducing to 55 units daily  -Continue NovoLog 10-20u with meals depending on what he eats; if patient has more frequent hypoglycemia recommend reducing dose to 10-15 units with meals  -Continue Dexcom G6 CGM  -Intolerant to ACEi/ARB due to CKD with hyperkalemia; blood pressure today 142/70     DM Health Maintenance:  Ophtho: 5/2/2023 showing primary open-angle glaucoma, mild nonproliferative diabetic retinopathy, age-related cataract of the right eye and posterior vitreous detachment  Monofilament / Foot exam:  Completed 3/1/2023  Lipids/Statin: taking rosuvastatin 20mg daily with last FLP showing LDL 95, , HDL 33,  done 11/23/2022   GABRIEL: not indicated as he is being followed for CKD 4   TSH: 1.28 done 12/10/2022  Aspirin: not taking as he takes plavix

## 2023-05-04 LAB
ALBUMIN SERPL-MCNC: 4.5 G/DL (ref 3.7–4.7)
ALBUMIN/GLOB SERPL: 1.6 {RATIO} (ref 1.2–2.2)
ALP SERPL-CCNC: 70 IU/L (ref 44–121)
ALT SERPL-CCNC: 10 IU/L (ref 0–44)
AST SERPL-CCNC: 16 IU/L (ref 0–40)
BASOPHILS # BLD AUTO: 0 X10E3/UL (ref 0–0.2)
BASOPHILS NFR BLD AUTO: 0 %
BILIRUB SERPL-MCNC: 0.2 MG/DL (ref 0–1.2)
BUN SERPL-MCNC: 29 MG/DL (ref 8–27)
BUN/CREAT SERPL: 17 (ref 10–24)
CALCIUM SERPL-MCNC: 10.6 MG/DL (ref 8.6–10.2)
CHLORIDE SERPL-SCNC: 103 MMOL/L (ref 96–106)
CHOLEST SERPL-MCNC: 224 MG/DL (ref 100–199)
CO2 SERPL-SCNC: 24 MMOL/L (ref 20–29)
CREAT SERPL-MCNC: 1.69 MG/DL (ref 0.76–1.27)
EGFRCR SERPLBLD CKD-EPI 2021: 41 ML/MIN/1.73
EOSINOPHIL # BLD AUTO: 0.1 X10E3/UL (ref 0–0.4)
EOSINOPHIL NFR BLD AUTO: 2 %
ERYTHROCYTE [DISTWIDTH] IN BLOOD BY AUTOMATED COUNT: 14.6 % (ref 11.6–15.4)
FERRITIN SERPL-MCNC: 132 NG/ML (ref 30–400)
GLOBULIN SER CALC-MCNC: 2.8 G/DL (ref 1.5–4.5)
GLUCOSE SERPL-MCNC: 82 MG/DL (ref 70–99)
HCT VFR BLD AUTO: 32.8 % (ref 37.5–51)
HDLC SERPL-MCNC: 37 MG/DL
HGB BLD-MCNC: 11.2 G/DL (ref 13–17.7)
IMM GRANULOCYTES # BLD AUTO: 0 X10E3/UL (ref 0–0.1)
IMM GRANULOCYTES NFR BLD AUTO: 1 %
IRON SATN MFR SERPL: 34 % (ref 15–55)
IRON SERPL-MCNC: 131 UG/DL (ref 38–169)
LDLC SERPL CALC-MCNC: 155 MG/DL (ref 0–99)
LYMPHOCYTES # BLD AUTO: 1.9 X10E3/UL (ref 0.7–3.1)
LYMPHOCYTES NFR BLD AUTO: 40 %
MCH RBC QN AUTO: 33 PG (ref 26.6–33)
MCHC RBC AUTO-ENTMCNC: 34.1 G/DL (ref 31.5–35.7)
MCV RBC AUTO: 97 FL (ref 79–97)
MONOCYTES # BLD AUTO: 0.7 X10E3/UL (ref 0.1–0.9)
MONOCYTES NFR BLD AUTO: 14 %
NEUTROPHILS # BLD AUTO: 2 X10E3/UL (ref 1.4–7)
NEUTROPHILS NFR BLD AUTO: 43 %
PLATELET # BLD AUTO: 353 X10E3/UL (ref 150–450)
POTASSIUM SERPL-SCNC: 4.2 MMOL/L (ref 3.5–5.2)
PROT SERPL-MCNC: 7.3 G/DL (ref 6–8.5)
RBC # BLD AUTO: 3.39 X10E6/UL (ref 4.14–5.8)
SODIUM SERPL-SCNC: 145 MMOL/L (ref 134–144)
T4 FREE SERPL-MCNC: 0.91 NG/DL (ref 0.82–1.77)
TIBC SERPL-MCNC: 387 UG/DL (ref 250–450)
TRIGL SERPL-MCNC: 176 MG/DL (ref 0–149)
TSH SERPL DL<=0.005 MIU/L-ACNC: 2.74 UIU/ML (ref 0.45–4.5)
UIBC SERPL-MCNC: 256 UG/DL (ref 111–343)
VLDLC SERPL CALC-MCNC: 32 MG/DL (ref 5–40)
WBC # BLD AUTO: 4.7 X10E3/UL (ref 3.4–10.8)

## 2023-05-05 RX ORDER — ROSUVASTATIN CALCIUM 20 MG/1
20 TABLET, COATED ORAL DAILY
Qty: 90 TABLET | Refills: 1 | Status: SHIPPED | OUTPATIENT
Start: 2023-05-05

## 2023-05-05 RX ORDER — FERROUS SULFATE 324(65)MG
324 TABLET, DELAYED RELEASE (ENTERIC COATED) ORAL
Qty: 90 TABLET | Refills: 1 | Status: SHIPPED | OUTPATIENT
Start: 2023-05-05

## 2023-05-05 RX ORDER — CITALOPRAM 20 MG/1
20 TABLET ORAL DAILY
Qty: 90 TABLET | Refills: 1 | Status: SHIPPED | OUTPATIENT
Start: 2023-05-05

## 2023-05-05 RX ORDER — GEMFIBROZIL 600 MG/1
600 TABLET, FILM COATED ORAL 2 TIMES DAILY
Qty: 180 TABLET | Refills: 1 | Status: SHIPPED | OUTPATIENT
Start: 2023-05-05

## 2023-05-08 ENCOUNTER — PATIENT OUTREACH (OUTPATIENT)
Dept: CASE MANAGEMENT | Facility: OTHER | Age: 78
End: 2023-05-08
Payer: MEDICARE

## 2023-05-08 ENCOUNTER — TELEPHONE (OUTPATIENT)
Dept: ENDOCRINOLOGY | Facility: CLINIC | Age: 78
End: 2023-05-08
Payer: MEDICARE

## 2023-05-08 DIAGNOSIS — I10 ESSENTIAL HYPERTENSION: ICD-10-CM

## 2023-05-08 DIAGNOSIS — N18.4 TYPE 2 DIABETES MELLITUS WITH STAGE 4 CHRONIC KIDNEY DISEASE, WITH LONG-TERM CURRENT USE OF INSULIN: Primary | ICD-10-CM

## 2023-05-08 DIAGNOSIS — E11.22 TYPE 2 DIABETES MELLITUS WITH STAGE 4 CHRONIC KIDNEY DISEASE, WITH LONG-TERM CURRENT USE OF INSULIN: Primary | ICD-10-CM

## 2023-05-08 DIAGNOSIS — Z79.4 TYPE 2 DIABETES MELLITUS WITH STAGE 4 CHRONIC KIDNEY DISEASE, WITH LONG-TERM CURRENT USE OF INSULIN: Primary | ICD-10-CM

## 2023-05-08 NOTE — OUTREACH NOTE
AMBULATORY CASE MANAGEMENT NOTE    Name and Relationship of Patient/Support Person: Elzbieta Nunez     CCM Interim Update    Spoke with patient and discussed the purpose, goals, and expectations of the program. Reviewed possible costs and ability to stop program at any time. Patient consented to CCM.    Patient requests financial support for Ozempic and Jardiance. Patient assistance program discussed. Forms placed at front office for patient to sign. Also provided number for [place] Prescription assistance program. Patient states he has Jardiance and Ozempic samples to last until assistance begins. However, his endocrinologist increase Ozempic to 2mg. He has 1mg samples. Contacted Dr. Rosenstein's office to clarify if patient is able to take 2 doses of 1mg to satisfy order or if office has samples of 2mg. Will notify patient with response.    Discussed hypertension and CHF management. Patient states he checks blood pressure and weights daily. Encouraged patient to write down values for PCP review. Advised patient to contact ACM if bp is greater than 160 systolic or lower than 90 systolic. Also encouraged patient to contact ACM if patient has greater than 5lb weight gain. Patient agreed. Patient states he exercises for at least 30 minutes daily. He monitors salt and fluid intake. No swelling noted at moment.       SDOH updated and reviewed with the patient during this program:  Financial Resource Strain: Low Risk    • Difficulty of Paying Living Expenses: Not hard at all      Physical Activity: Sufficiently Active   • Days of Exercise per Week: 7 days   • Minutes of Exercise per Session: 40 min      Food Insecurity: No Food Insecurity   • Worried About Running Out of Food in the Last Year: Never true   • Ran Out of Food in the Last Year: Never true      Social Connections: Socially Integrated   • Frequency of Communication with Friends and Family: More than three times a week   • Frequency of Social  Gatherings with Friends and Family: More than three times a week   • Attends [employer] Services: More than 4 times per year   • Active Member of Clubs or Organizations: Yes   • Attends Club or Organization Meetings: More than 4 times per year   • Marital Status:       Transportation Needs: No Transportation Needs   • Lack of Transportation (Medical): No   • Lack of Transportation (Non-Medical): No      Housing Stability: Low Risk    • Unable to Pay for Housing in the Last Year: No   • Number of Places Lived in the Last Year: 1   • Unstable Housing in the Last Year: No      Stress: No Stress Concern Present   • Feeling of Stress : Not at all        Send Education  Questions/Answers    Flowsheet Row Most Recent Value   Other Patient Education/Resources  24/7 [facility] Nurse Call Line, Advanced Care Planning   24/7 Nurse Call Line Education Method Verbal   ACP Education Method Verbal   Advanced Directives: Not Interested At This Time          Social Work Assessment  Questions/Answers    Flowsheet Row Most Recent Value   Current Living Arrangements home   Q1: How often do you have a drink containing alcohol? Never   Q2: How many drinks containing alcohol do you have on a typical day when you are drinking? None   Q3: How often do you have six or more drinks on one occasion? Never   Audit-C Score 0          Adult Patient Profile  Questions/Answers    Flowsheet Row Most Recent Value   Symptoms/Conditions Managed at Home cardiovascular   Barriers to Managing Health stress of chronic illness, medication, unable to afford, understanding health advice   Cardiovascular Symptoms/Conditions heart failure, hypertension   Cardiovascular Management Strategies adequate rest, medication therapy, activity, weight management   Identifying Health Goals keep illness under control   Q1: How often do you have a drink containing alcohol? Never   Q2: How many drinks containing alcohol do you have on a typical day when you are  drinking? None   Q3: How often do you have six or more drinks on one occasion? Never   Audit-C Score 0   Current Living Arrangements home   Resource/Environmental Concerns none          Education Documentation  unresolved or worsening symptoms, taught by Lily Hermosillo RN at 5/8/2023  4:33 PM.  Learner: Patient  Readiness: Acceptance  Method: Explanation  Response: Verbalizes Understanding    tobacco use/smoke exposure, taught by Lily Hermosillo RN at 5/8/2023  4:33 PM.  Learner: Patient  Readiness: Acceptance  Method: Explanation  Response: Verbalizes Understanding    safety, taught by Lily Hermosillo RN at 5/8/2023  4:33 PM.  Learner: Patient  Readiness: Acceptance  Method: Explanation  Response: Verbalizes Understanding    medication management, taught by Lily Hermosillo RN at 5/8/2023  4:33 PM.  Learner: Patient  Readiness: Acceptance  Method: Explanation  Response: Verbalizes Understanding    food/fluid intake, taught by Lily Hermosillo RN at 5/8/2023  4:33 PM.  Learner: Patient  Readiness: Acceptance  Method: Explanation  Response: Verbalizes Understanding    family planning overview, taught by Lily Hermosillo RN at 5/8/2023  4:33 PM.  Learner: Patient  Readiness: Acceptance  Method: Explanation  Response: Verbalizes Understanding    drug/alcohol use, taught by Lily Hermosillo RN at 5/8/2023  4:33 PM.  Learner: Patient  Readiness: Acceptance  Method: Explanation  Response: Verbalizes Understanding    coping strategies, taught by Lily Hermosillo RN at 5/8/2023  4:33 PM.  Learner: Patient  Readiness: Acceptance  Method: Explanation  Response: Verbalizes Understanding    activity, taught by Lily Hermosillo RN at 5/8/2023  4:33 PM.  Learner: Patient  Readiness: Acceptance  Method: Explanation  Response: Verbalizes Understanding    preventive care, taught by Lily Hermosillo RN at 5/8/2023  4:33 PM.  Learner: Patient  Readiness: Acceptance  Method: Explanation  Response: Verbalizes  Understanding    practice overview, taught by Lily Hermosillo RN at 5/8/2023  4:33 PM.  Learner: Patient  Readiness: Acceptance  Method: Explanation  Response: Verbalizes Understanding    patient-centered medical home, taught by Lily Hermosillo RN at 5/8/2023  4:33 PM.  Learner: Patient  Readiness: Acceptance  Method: Explanation  Response: Verbalizes Understanding    Unresolved/Worsening Symptoms, taught by Lily Hermosillo RN at 5/8/2023  4:33 PM.  Learner: Patient  Readiness: Acceptance  Method: Explanation  Response: Verbalizes Understanding    Self-Care, taught by Lily Hermosillo RN at 5/8/2023  4:33 PM.  Learner: Patient  Readiness: Acceptance  Method: Explanation  Response: Verbalizes Understanding    Provider Follow-Up, taught by Lily Hermosillo RN at 5/8/2023  4:33 PM.  Learner: Patient  Readiness: Acceptance  Method: Explanation  Response: Verbalizes Understanding    Medication Management, taught by Lily Hermosillo RN at 5/8/2023  4:33 PM.  Learner: Patient  Readiness: Acceptance  Method: Explanation  Response: Verbalizes Understanding    Blood Pressure Monitoring, taught by Lily Hermosillo RN at 5/8/2023  4:33 PM.  Learner: Patient  Readiness: Acceptance  Method: Explanation  Response: Verbalizes Understanding    Risk Factors, taught by Lily Hermosillo RN at 5/8/2023  4:33 PM.  Learner: Patient  Readiness: Acceptance  Method: Explanation  Response: Verbalizes Understanding    Description, taught by Lily Hermosillo RN at 5/8/2023  4:33 PM.  Learner: Patient  Readiness: Acceptance  Method: Explanation  Response: Verbalizes Understanding    VTE Symptoms, taught by Lily Hermosillo RN at 5/8/2023  4:33 PM.  Learner: Patient  Readiness: Acceptance  Method: Explanation  Response: Verbalizes Understanding    Unresolved/Worsening Symptoms, taught by Lily Hermosillo RN at 5/8/2023  4:33 PM.  Learner: Patient  Readiness: Acceptance  Method: Explanation  Response: Verbalizes Understanding    Weight  Monitoring, taught by Lily Hermosillo RN at 5/8/2023  4:33 PM.  Learner: Patient  Readiness: Acceptance  Method: Explanation  Response: Verbalizes Understanding    VTE Prevention, taught by Lily Hermosillo RN at 5/8/2023  4:33 PM.  Learner: Patient  Readiness: Acceptance  Method: Explanation  Response: Verbalizes Understanding    Tobacco Use, Smoke Exposure, taught by Lily Hermosillo RN at 5/8/2023  4:33 PM.  Learner: Patient  Readiness: Acceptance  Method: Explanation  Response: Verbalizes Understanding    Self-Care, taught by Lily Hermosillo RN at 5/8/2023  4:33 PM.  Learner: Patient  Readiness: Acceptance  Method: Explanation  Response: Verbalizes Understanding    Rehabilitation Therapy, taught by Lily Hermosillo RN at 5/8/2023  4:33 PM.  Learner: Patient  Readiness: Acceptance  Method: Explanation  Response: Verbalizes Understanding    Provider Follow-Up, taught by Lily Hermoisllo RN at 5/8/2023  4:33 PM.  Learner: Patient  Readiness: Acceptance  Method: Explanation  Response: Verbalizes Understanding    Medication Management, taught by Lily Hermosillo RN at 5/8/2023  4:33 PM.  Learner: Patient  Readiness: Acceptance  Method: Explanation  Response: Verbalizes Understanding    Diet Modification, taught by Lily Hermosillo RN at 5/8/2023  4:33 PM.  Learner: Patient  Readiness: Acceptance  Method: Explanation  Response: Verbalizes Understanding    Tobacco Use, taught by Lily Hermosillo RN at 5/8/2023  4:33 PM.  Learner: Patient  Readiness: Acceptance  Method: Explanation  Response: Verbalizes Understanding    Physical Activity, taught by Lily Hermosillo RN at 5/8/2023  4:33 PM.  Learner: Patient  Readiness: Acceptance  Method: Explanation  Response: Verbalizes Understanding    Obesity, taught by Lily Hermosillo RN at 5/8/2023  4:33 PM.  Learner: Patient  Readiness: Acceptance  Method: Explanation  Response: Verbalizes Understanding    Illicit Drug Use, taught by Lily Hermosillo RN at 5/8/2023  4:33  PM.  Learner: Patient  Readiness: Acceptance  Method: Explanation  Response: Verbalizes Understanding    Hypertension, taught by Lily Hermosillo RN at 5/8/2023  4:33 PM.  Learner: Patient  Readiness: Acceptance  Method: Explanation  Response: Verbalizes Understanding    High Blood Cholesterol, taught by Lily Hermosillo RN at 5/8/2023  4:33 PM.  Learner: Patient  Readiness: Acceptance  Method: Explanation  Response: Verbalizes Understanding    Excessive Alcohol Intake, taught by Lily Hermosillo RN at 5/8/2023  4:33 PM.  Learner: Patient  Readiness: Acceptance  Method: Explanation  Response: Verbalizes Understanding    Diabetes, taught by Lily Hermosillo RN at 5/8/2023  4:33 PM.  Learner: Patient  Readiness: Acceptance  Method: Explanation  Response: Verbalizes Understanding    Signs/Symptoms, taught by Lily Hermosillo RN at 5/8/2023  4:33 PM.  Learner: Patient  Readiness: Acceptance  Method: Explanation  Response: Verbalizes Understanding    Description, taught by Lily Hermosillo RN at 5/8/2023  4:33 PM.  Learner: Patient  Readiness: Acceptance  Method: Explanation  Response: Verbalizes Understanding    Causes, taught by Lily Hermosillo RN at 5/8/2023  4:33 PM.  Learner: Patient  Readiness: Acceptance  Method: Explanation  Response: Verbalizes Understanding          Lily WADSWORTH  Ambulatory Case Management    5/8/2023, 16:41 EDT

## 2023-05-08 NOTE — TELEPHONE ENCOUNTER
Lily called from [facility] in [place]. Stated that she is  and has a few questions regarding patients ozempic. Stated Dr. Rosenstein prescribed 2 mg of ozempic, but that the patient was given 1 mg. She is wanting to know if patient cane take two 1 mg. She also was wanting to know if we had any 2 mg samples. She would like a call back [phone]. Thank you!

## 2023-05-10 ENCOUNTER — PATIENT OUTREACH (OUTPATIENT)
Dept: CASE MANAGEMENT | Facility: OTHER | Age: 78
End: 2023-05-10
Payer: MEDICARE

## 2023-05-10 DIAGNOSIS — N18.4 TYPE 2 DIABETES MELLITUS WITH STAGE 4 CHRONIC KIDNEY DISEASE, WITH LONG-TERM CURRENT USE OF INSULIN: Primary | ICD-10-CM

## 2023-05-10 DIAGNOSIS — E11.22 TYPE 2 DIABETES MELLITUS WITH STAGE 4 CHRONIC KIDNEY DISEASE, WITH LONG-TERM CURRENT USE OF INSULIN: Primary | ICD-10-CM

## 2023-05-10 DIAGNOSIS — I10 ESSENTIAL HYPERTENSION: ICD-10-CM

## 2023-05-10 DIAGNOSIS — Z79.4 TYPE 2 DIABETES MELLITUS WITH STAGE 4 CHRONIC KIDNEY DISEASE, WITH LONG-TERM CURRENT USE OF INSULIN: Primary | ICD-10-CM

## 2023-05-10 NOTE — OUTREACH NOTE
AMBULATORY CASE MANAGEMENT NOTE    Name and Relationship of Patient/Support Person:  -     [facility] Interim Update    Spoke with Dr. Rosenstein about Ozempic 1mg samples. Patient may take 1mg every 5 days or two of the 1mg samples weekly until receiving medication from PAP. Patient notified. No further needs at this time.         Education Documentation  No documentation found.        Lily WADSWORTH  Ambulatory Case Management    5/10/2023, 11:28 EDT

## 2023-05-15 RX ORDER — PREDNISONE 10 MG/1
40 TABLET ORAL DAILY
Qty: 20 TABLET | Refills: 0 | OUTPATIENT
Start: 2023-05-15 | End: 2023-05-20

## 2023-05-30 ENCOUNTER — PATIENT OUTREACH (OUTPATIENT)
Dept: CASE MANAGEMENT | Facility: OTHER | Age: 78
End: 2023-05-30

## 2023-05-30 DIAGNOSIS — I10 ESSENTIAL HYPERTENSION: ICD-10-CM

## 2023-05-30 DIAGNOSIS — Z79.4 TYPE 2 DIABETES MELLITUS WITH STAGE 4 CHRONIC KIDNEY DISEASE, WITH LONG-TERM CURRENT USE OF INSULIN: Primary | ICD-10-CM

## 2023-05-30 DIAGNOSIS — N18.4 TYPE 2 DIABETES MELLITUS WITH STAGE 4 CHRONIC KIDNEY DISEASE, WITH LONG-TERM CURRENT USE OF INSULIN: Primary | ICD-10-CM

## 2023-05-30 DIAGNOSIS — E11.22 TYPE 2 DIABETES MELLITUS WITH STAGE 4 CHRONIC KIDNEY DISEASE, WITH LONG-TERM CURRENT USE OF INSULIN: Primary | ICD-10-CM

## 2023-05-30 NOTE — OUTREACH NOTE
[facility] End of Month Documentation    This Chronic Medical Management Care Plan for Elzbieta Nunez, 78 y.o. male, has been a new plan of care implemented; established and a new plan of care implemented for the month of May.  A cumulative time of 65  minutes was spent on this patient record this month, including face to face with provider; phone call with patient; chart review; electronic communication with primary care provider; phone call with other providers.    Regarding the patient's problems: has Essential hypertension; Mixed hyperlipidemia; PVD (peripheral vascular disease); and Type 2 diabetes mellitus with stage 4 chronic kidney disease, with long-term current use of insulin on their problem list., the following items were addressed: medical records; medications and any changes can be found within the plan section of the note.  A detailed listing of time spent for chronic care management is tracked within each outreach encounter.  Current medications include:  has a current medication list which includes the following prescription(s): allopurinol, amlodipine, bumetanide, carvedilol, chlorthalidone, citalopram, clopidogrel, dexcom g6 sensor, dexcom g6 transmitter, empagliflozin, ferrous sulfate, gemfibrozil, hydralazine, novolog flexpen, tresiba flextouch, b-d ultrafine iii short pen, isosorbide mononitrate, pantoprazole, rosuvastatin, and ozempic (2 mg/dose). and the patient is reported to be patient is compliant with medication protocol,  Medications are reported to be effective. All notes on chart for PCP to review.    The patient was monitored remotely for blood pressure; weight.    The patient's physical needs include:  physical healthcare.     The patient's mental support needs include:  not applicable    The patient's cognitive support needs include:  coordination of community providers; health care    The patient's psychosocial support needs include:  continued support    The patient's functional needs  include: not applicable    The patient's environmental needs include:  not applicable    Care Plan overall comments:  No data recorded    Refer to previous outreach notes for more information on the areas listed above.    Monthly Billing Diagnoses  (E11.22,  N18.4,  Z79.4) Type 2 diabetes mellitus with stage 4 chronic kidney disease, with long-term current use of insulin    (I10) Essential hypertension    Medications   · Medications have been reconciled    Care Plan progress this month:      Recently Modified Care Plans Updates made since 4/29/2023 12:00 AM     Heart Failure (Adult)         Problem Priority Last Modified     Symptom Exacerbation (Heart Failure) --  5/8/2023  4:24 PM by Lily Hermosillo RN              Goal Recent Progress Last Modified     Symptom Exacerbation Prevented or Minimized --  5/8/2023  4:24 PM by Lily Hermosillo RN     Evidence-based guidance:   Perform or review cognitive and/or health literacy screening.   Assess understanding of adherence and barriers to treatment plan, as well as lifestyle changes; develop strategies to address barriers.   Establish a [id number] early intervention process to communicate with primary care provider when signs/symptoms worsen.   Facilitate timely posthospital discharge or emergency department treatment that includes intensive follow-up via telephone calls, home visit, telehealth monitoring and care at multidisciplinary heart failure clinic.   Adjust frequency and intensity of follow-up based on presentation, number of emergency department visits, hospital admissions and frequency and severity of symptom exacerbation.   Facilitate timely visit, usually within 1 week, with primary care provider following hospital discharge.   Collaborate with clinical pharmacist to address adverse drug reactions, drug interactions, subtherapeutic dosage, patient and family education.   Regularly screen for presence of depressive symptoms using a validated tool;  consider pharmacologic therapy and/or referral for cognitive behavioral therapy when present.   Refer to community-based services, such as a heart failure support group, community health worker or peer support program.   Review immunization status; arrange receipt of needed vaccinations.   Prepare patient for home oxygen use based on signs/symptoms.    Notes:            Task Due Date Last Modified     Identify and Minimize Risk of Heart Failure Exacerbation 8/11/2023 5/8/2023  4:26 PM by Lily Hermosillo RN     Care Management Activities:      - barriers to lifestyle changes reviewed and addressed  - healthy lifestyle promoted  - self-awareness of signs/symptoms of worsening disease encouraged      Notes:              Problem Priority Last Modified     Disease Progression (Heart Failure) --  5/8/2023  4:24 PM by Lily Hermosillo RN              Goal Recent Progress Last Modified     Comorbidities Identified and Managed --  5/8/2023  4:24 PM by Lily Hermosillo RN     Evidence-based guidance:   Assess and address signs/symptoms of comorbidity, including dyslipidemia, diabetes, iron deficiency, gout, arthritis, dysrhythmia, hypertension, cachexia, coronary artery disease, kidney dysfunction and lung disease.   Prepare patient for laboratory and diagnostic exams based on risk and presentation.   Prepare for use of pharmacologic therapy that may include statin, angiotensin converting enzyme (ACE) inhibitor, angiotensin receptor blocker (ARB), beta-blocker, digoxin, antidysrhythmic, diuretic or omega-3 fatty acid.   Monitor side effects and anticipate need for periodic adjustments.   Prepare patient for potential invasive treatment, such as implantable cardioverter-defibrillator, cardiac resynchronization therapy or heart transplant as disease progresses.    Notes:            Task Due Date Last Modified     Identify and Manage Comorbidities 8/11/2023 5/8/2023  4:27 PM by Lily Hermosillo RN     Care Management  Activities:      - signs/symptoms of comorbidities identified      Notes:              Goal Recent Progress Last Modified     Health Optimized --  5/8/2023  4:24 PM by Lily Hermosillo RN     Evidence-based guidance:   Use brief intervention, such as 5 A's (Ask, Advise, Assess, Assist, Arrange) to encourage smoking cessation; refer to smoking cessation program, if ready for more intensive intervention.   Perform or refer to a registered dietitian for a nutrition assessment and nutrition-focused physical exam.    Identify potential micronutrient deficiencies, such as iron, vitamin D and thiamin.   Assess need for potential diet and fluid modification, such as reduced sodium or fluid intake.   Minimize unnecessary dietary restrictions to increase oral intake. Note: Sodium restriction should be individualized to the patient and clinical status.   Facilitate home monitoring of weight.    Notes:            Task Due Date Last Modified     Optimize Health --  5/8/2023  4:24 PM by Lily Hermosillo RN     Care Management Activities:      - barriers to sufficient nutrient intake addressed  - home monitoring of blood pressure encouraged  - home monitoring of weight gain or loss encouraged  - weight gain or loss reviewed and trended      Notes:            Problem Priority Last Modified     Activity Tolerance (Heart Failure) --  5/8/2023  4:24 PM by Lily Hermosillo, RN              Goal Recent Progress Last Modified     Activity Tolerance Optimized --  5/8/2023  4:24 PM by Lily Hermosillo RN     Evidence-based guidance:   Promote daily physical activity that improves functional ability, cognition and quality of life.   Encourage reduction in sedentary time.    Encourage optimal, safe functional mobility and self-care performance based on ability and tolerance.    Promote breathing and energy conservation techniques, such as pursed-lip breathing, preplanning and pacing of activity, balancing activity and rest.   Encourage  "participation in cardiac rehabilitation services.    Notes:            Task Due Date Last Modified     Maintain Strength and Functional Ability 8/11/2023 5/8/2023  4:27 PM by Lily Hermosillo RN     Care Management Activities:      - activity or exercise based on tolerance encouraged  - barriers to activities addressed  - daily activity/exercise promoted  - self-care encouraged      Notes:              Problem Priority Last Modified     Obstructive Sleep Apnea (Heart Failure) --  5/8/2023  4:24 PM by Lily Hermosillo RN                 Hypertension (Adult)         Problem Priority Last Modified     Hypertension (Hypertension) --  5/8/2023  4:24 PM by Lily Hermosillo RN              Goal Recent Progress Last Modified     Hypertension Monitored --  5/8/2023  4:24 PM by Lily Hermosillo RN     Evidence-based guidance:   Promote initial use of ambulatory blood pressure measurements (for 3 days) to rule out \"white-coat\" effect; identify masked hypertension and presence or absence of nocturnal \"dipping\" of blood pressure.    Encourage continued use of home blood pressure monitoring and recording in blood pressure log; include symptoms of hypotension or potential medication side effects in log.   Review blood pressure measurements taken inside and outside of the provider office; establish baseline and monitor trends; compare to target ranges or patient goal.   Share overall cardiovascular risk with patient; encourage changes to lifestyle risk factors, including alcohol consumption, smoking, inadequate exercise, poor dietary habits and stress.    Notes:            Task Due Date Last Modified     Identify and Monitor Blood Pressure Elevation 8/11/2023 5/8/2023  4:28 PM by Lily Hermosillo RN     Care Management Activities:      - home or ambulatory blood pressure monitoring encouraged      Notes:              Problem Priority Last Modified     Disease Progression (Hypertension) --  5/8/2023  4:24 PM by Lily Hermosillo RN "              Goal Recent Progress Last Modified     Disease Progression Prevented or Minimized --  5/8/2023  4:24 PM by Lily Hermosillo RN     Evidence-based guidance:   Tailor lifestyle advice to individual; review progress regularly; give frequent encouragement and respond positively to incremental successes.   Assess for and promote awareness of worsening disease or development of comorbidity.   Prepare patient for laboratory and diagnostic exams based on risk and presentation.   Prepare patient for use of pharmacologic therapy that may include diuretic, beta-blocker, beta-blocker/thiazide combination, angiotensin-converting enzyme inhibitor, renin-angiotensin blocker or calcium-channel blocker.   Expect periodic adjustments to pharmacologic therapy; manage side effects.   Promote a healthy diet that includes primarily plant-based foods, such as fruits, vegetables, whole grains, beans and legumes, low-fat dairy and lean meats.    Consider moderate reduction in sodium intake by avoiding the addition of salt to prepared foods and limiting processed meats, canned soup, frozen meals and salty snacks.    Promote a regular, daily exercise goal of 150 minutes per week of moderate exercise based on tolerance, ability and patient choice; consider referral to physical therapist, community wellness and/or activity program.   Encourage the avoidance of no more than 2 hours per day of sedentary activity, such as recreational screen time.   Review sources of stress; explore current coping strategies and encourage use of mindfulness, yoga, meditation or exercise to manage stress.    Notes:            Task Due Date Last Modified     Alleviate Barriers to Hypertension Treatment 8/11/2023 5/8/2023  4:28 PM by Lily Hermosillo RN     Care Management Activities:      - healthy family lifestyle promoted  - reduction in sedentary activities encouraged      Notes:              Problem Priority Last Modified     Resistant Hypertension  (Hypertension) --  5/8/2023  4:24 PM by Lily Hermosillo RN              Goal Recent Progress Last Modified     Response to Treatment Maximized --  5/8/2023  4:24 PM by Lily Hermosillo RN     Evidence-based guidance:   Assess patient response to treatment, including presence or absence of medication side effects, degree of blood pressure control and patient satisfaction.   Assess technique (including cuff size and placement), measurement times, condition and calibration of blood pressure cuff set (both at-home and in-office equipment).   Assess factors that may influence response to treatment, including nonadherence to pharmacologic treatment plan, diet or activity changes and/or presence of pain, stress or sleep disturbance.   Screen for signs and symptoms of depression; if present, refer for or complete a comprehensive assessment.   Evaluate social and economic barriers that may affect adherence to treatment plan   Address pharmacologic nonadherence by simplifying dosing regimen, counseling or support by pharmacist, financial assistance, self-monitoring of blood pressure, use of motivational interviewing, voice or text messages.   Encourage behavioral adherence strategies, like habit-based interventions that link medication taking with existing daily routines.   Assess barriers to regular, daily physical activity; support family or support person-oriented activity changes and utilization of community activity or sports program.   Address barriers to dietary changes, especially sodium restriction, with referrals to community programs, like cooking classes, meal services or intensive education when available.   Refer to community-based peer support program or nurse home-visiting program.   Assess for chronic pain; when present add additional goals (Chronic Pain Care Plan Guide) as needed.   Provide frequent follow-up by telephone, telemonitoring, patient-practice portal or with home visit.   Review alcohol use  screen; address using brief intervention beginning with risk that interferes with blood pressure control; refer for treatment when excessive alcohol use is noted.   Screen for obstructive sleep apnea; prepare patient for polysomnography based on risk and presentation and use of noninvasive ventilation to relieve obstructive sleep apnea when present.    Notes:            Task Due Date Last Modified     Facilitate Adherence to Lifestyle Change 8/11/2023 5/8/2023  4:29 PM by Lily Hermosillo RN     Care Management Activities:      - barriers to lifestyle change identified  - resources needed to manage barriers to lifestyle change identified  - support and encouragement provided      Notes:                      · Current Specialty Plan of Care Status signed by both patient and provider    Instructions   · Patient was provided an electronic copy of care plan  · CCM services were explained and offered and patient has accepted these services.  · Patient has given their written consent to receive CCM services and understands that this includes the authorization of electronic communication of medical information with the other treating providers.  · Patient understands that they may stop CCM services at any time and these changes will be effective at the end of the calendar month and will effectively revocate the agreement of CCM services.  · Patient understands that only one practitioner can furnish and be paid for CCM services during one calendar month.  Patient also understands that there may be co-payment or deductible fees in association with CCM services.  · Patient will continue with at least monthly follow-up calls with the Ambulatory .    Lily WADSWORTH  Ambulatory Case Management    5/30/2023, 13:46 EDT

## 2023-07-20 ENCOUNTER — TELEPHONE (OUTPATIENT)
Dept: FAMILY MEDICINE CLINIC | Facility: CLINIC | Age: 78
End: 2023-07-20

## 2023-07-20 NOTE — TELEPHONE ENCOUNTER
HUB TO READ    PT IS SCHEDULED WITH DR LORENZO ON 8/1/2023 AT 3:30 PM AND DR PONCE 7/31 AT 8:30 AM.

## 2023-07-28 ENCOUNTER — TELEPHONE (OUTPATIENT)
Dept: ENDOCRINOLOGY | Facility: CLINIC | Age: 78
End: 2023-07-28
Payer: MEDICARE

## 2023-08-02 ENCOUNTER — TELEPHONE (OUTPATIENT)
Dept: FAMILY MEDICINE CLINIC | Facility: CLINIC | Age: 78
End: 2023-08-02

## 2023-08-02 NOTE — TELEPHONE ENCOUNTER
Can somebody fax this please. This pt has had a lot of labs in the last month because he was admitted to the hospital

## 2023-08-07 ENCOUNTER — PATIENT OUTREACH (OUTPATIENT)
Dept: CASE MANAGEMENT | Facility: OTHER | Age: 78
End: 2023-08-07
Payer: MEDICARE

## 2023-08-07 DIAGNOSIS — E11.22 TYPE 2 DIABETES MELLITUS WITH STAGE 4 CHRONIC KIDNEY DISEASE, WITH LONG-TERM CURRENT USE OF INSULIN: Primary | ICD-10-CM

## 2023-08-07 DIAGNOSIS — I10 ESSENTIAL HYPERTENSION: ICD-10-CM

## 2023-08-07 DIAGNOSIS — Z79.4 TYPE 2 DIABETES MELLITUS WITH STAGE 4 CHRONIC KIDNEY DISEASE, WITH LONG-TERM CURRENT USE OF INSULIN: Primary | ICD-10-CM

## 2023-08-07 DIAGNOSIS — N18.4 TYPE 2 DIABETES MELLITUS WITH STAGE 4 CHRONIC KIDNEY DISEASE, WITH LONG-TERM CURRENT USE OF INSULIN: Primary | ICD-10-CM

## 2023-08-07 NOTE — OUTREACH NOTE
AMBULATORY CASE MANAGEMENT NOTE    Name and Relationship of Patient/Support Person: Elzbieta Nunez - Self    CCM Interim Update    Spoke to patient about recent hospitalization. He states he is doing well. His bp has been within normal limits. No abnormal weight gain or swelling noted. He has followed up w/ cardiology and PCP since hospitalization.     Discussed diabetes management. Patient states his blood sugar remains below 200. He has kept appointments with endocrinology. Patient advised to contact ACM if blood sugar values are above 200 or less than 70. Patient agreed. No further needs at this time.         Education Documentation  No documentation found.        Lily WADSWORTH  Ambulatory Case Management    8/7/2023, 15:38 EDT

## 2023-08-09 ENCOUNTER — OFFICE VISIT (OUTPATIENT)
Dept: ENDOCRINOLOGY | Facility: CLINIC | Age: 78
End: 2023-08-09
Payer: MEDICARE

## 2023-08-09 VITALS
DIASTOLIC BLOOD PRESSURE: 80 MMHG | WEIGHT: 236 LBS | BODY MASS INDEX: 33.04 KG/M2 | OXYGEN SATURATION: 98 % | HEIGHT: 71 IN | HEART RATE: 62 BPM | SYSTOLIC BLOOD PRESSURE: 128 MMHG

## 2023-08-09 DIAGNOSIS — Z79.4 TYPE 2 DIABETES MELLITUS WITH STAGE 3B CHRONIC KIDNEY DISEASE, WITH LONG-TERM CURRENT USE OF INSULIN: Primary | ICD-10-CM

## 2023-08-09 DIAGNOSIS — N18.32 TYPE 2 DIABETES MELLITUS WITH STAGE 3B CHRONIC KIDNEY DISEASE, WITH LONG-TERM CURRENT USE OF INSULIN: Primary | ICD-10-CM

## 2023-08-09 DIAGNOSIS — I10 PRIMARY HYPERTENSION: ICD-10-CM

## 2023-08-09 DIAGNOSIS — E11.22 TYPE 2 DIABETES MELLITUS WITH STAGE 3B CHRONIC KIDNEY DISEASE, WITH LONG-TERM CURRENT USE OF INSULIN: Primary | ICD-10-CM

## 2023-08-09 DIAGNOSIS — E78.2 MIXED HYPERLIPIDEMIA: ICD-10-CM

## 2023-08-09 LAB
EXPIRATION DATE: NORMAL
HBA1C MFR BLD: 5.9 %
Lab: NORMAL

## 2023-08-09 PROCEDURE — 3044F HG A1C LEVEL LT 7.0%: CPT | Performed by: HOSPITALIST

## 2023-08-09 PROCEDURE — 3079F DIAST BP 80-89 MM HG: CPT | Performed by: HOSPITALIST

## 2023-08-09 PROCEDURE — 1159F MED LIST DOCD IN RCRD: CPT | Performed by: HOSPITALIST

## 2023-08-09 PROCEDURE — 83036 HEMOGLOBIN GLYCOSYLATED A1C: CPT | Performed by: HOSPITALIST

## 2023-08-09 PROCEDURE — 99214 OFFICE O/P EST MOD 30 MIN: CPT | Performed by: HOSPITALIST

## 2023-08-09 PROCEDURE — 1160F RVW MEDS BY RX/DR IN RCRD: CPT | Performed by: HOSPITALIST

## 2023-08-09 PROCEDURE — 3074F SYST BP LT 130 MM HG: CPT | Performed by: HOSPITALIST

## 2023-08-09 RX ORDER — SEMAGLUTIDE 1.34 MG/ML
1 INJECTION, SOLUTION SUBCUTANEOUS WEEKLY
Qty: 9 ML | Refills: 2 | Status: SHIPPED | OUTPATIENT
Start: 2023-08-09

## 2023-08-09 RX ORDER — INSULIN DEGLUDEC 200 U/ML
INJECTION, SOLUTION SUBCUTANEOUS
Qty: 30 ML | Refills: 11 | Status: SHIPPED | OUTPATIENT
Start: 2023-08-09

## 2023-08-09 RX ORDER — INSULIN ASPART 100 [IU]/ML
INJECTION, SOLUTION INTRAVENOUS; SUBCUTANEOUS
Qty: 15 ML | Refills: 11 | Status: SHIPPED | OUTPATIENT
Start: 2023-08-09

## 2023-08-09 RX ORDER — ACYCLOVIR 400 MG/1
1 TABLET ORAL
Qty: 9 EACH | Refills: 3 | Status: SHIPPED | OUTPATIENT
Start: 2023-08-09

## 2023-08-09 NOTE — ASSESSMENT & PLAN NOTE
-Diabetes remains uncontrolled due to hyperglycemia and hypoglycemia  -Complications include known CKD IIIb, CAD, diabetic retinopathy and diabetic polyneuropathy  -Patient remains high risk for complications due to persistent hyperglycemia; counseled that long term hyperglycemia can cause worsening neuropathy, kidney damage, eye damage, and cardiovascular disease   -Agree with reduction of insulin to reduce hypoglycemia  -Continue Tresiba U200 30 units once daily  -Recommend NovoLog 5 units with meals if blood glucose is less than 200 mg/dL and 10 units with meals of blood glucose is greater than 200 mg/dL  -Continue Ozempic 1 mg weekly  -Recommend he check his pills at home to ensure he is no longer taking Jardiance as he was unclear at the visit today  -Continue Dexcom G7 CGM  - intolerant to ACEi/ARB due to CKD; on finerenone; blood pressure today 128/80     DM Health Maintenance:  Ophtho: 5/2/2023 showing primary open-angle glaucoma, mild nonproliferative diabetic retinopathy, age-related cataract of the right eye and posterior vitreous detachment  Monofilament / Foot exam:  Completed 3/1/2023  Lipids/Statin: taking rosuvastatin 20mg daily with last FLP showing LDL 95, , HDL 33,  done 11/23/2022   GABRIEL: not indicated as he is being followed for CKD IIIb  TSH: 1.28 done 12/10/2022  Aspirin: not taking as he takes plavix

## 2023-08-09 NOTE — ASSESSMENT & PLAN NOTE
-Managed by PCP  -Advised that he will need a dose reduction in rosuvastatin if renal function declines

## 2023-08-09 NOTE — PROGRESS NOTES
Chief complaint/Reason for consult: T2DM    HPI: Mr. Nunez is a 78yoM with T2DM, hypertension, hyperlipidemia, PVD and GERD who comes for follow-up of T2DM. Since lat visit on 5/3/2023 he reports hospital admission for hypertensive emergency with heart failure exacerbation. He reports feeling better since that time.     # [id number], uncontrolled due to hyperglycemia and hypoglycemia with complications  # CKD IIIb   # CAD with heart failure   # Diabetic retinopathy  # Diabetic polyneuropathy  - Diagnosed: ~1995 years ago   - Current regimen includes:  Ozempic 1 mg weekly, Tresiba (U-200) 30u once daily and Novolog 0-10u with meals depending on what he eats   - He reduced his insulin dose after having a couple low blood glucoses  - Previously on Jardiance 10mg daily for renal protection but thinks this was switched to finerenone per nephrology    - Denies nausea, vomiting, abdominal pain; has occasional mild constipation  - Reports higher ozempic dose was too expensive so went back to the 1 mg weekly dose  - Compliance with medication is fair    - HbA1c: 5.9% today (unreliable due to anemia)   - Using Dexcom G6,  broke recently so started using his phone a few days ago and we do not have significant data from it     CGM Download: avg glc 148mg/dl with 72% time in range     - Takes prednisone for gout occasionally, none recently  - Hypoglycemia occurs less frequently since significant reduction of insulin dose  - Patient has symptoms with lows   - Hyperglycemia occurs after meals  - Patient reports neuropathy that is stable in hands and feet   - Patient denies gastroparesis   - Patient reports rotating injection sites, uses abdomen  - Patient with known ASCVD   - intolerant to ACEi/ARB due to CKD with hyperkalemia; blood pressure today 128/80     DM Health Maintenance:  Ophtho: 5/2/2023 showing primary open-angle glaucoma, mild nonproliferative diabetic retinopathy, age-related cataract of the right eye and  "posterior vitreous detachment  Monofilament / Foot exam:  Completed 3/1/2023  Lipids/Statin: taking rosuvastatin 20mg daily with last FLP showing LDL 95, , HDL 33,  done 11/23/2022   GABRIEL: not indicated as he is being followed for CKD IIIb  TSH: 1.28 done 12/10/2022  Aspirin: not taking as he takes plavix      # Mixed hyperlipidemia   - Currently taking gemfibrozil 600 mg twice daily and rosuvastatin 20 mg daily; managed by PCP  - FLP done 11/23/2022 showed LDL 95, , HDL 33,    - Has known CKD IIIb     Past medical history, past surgical history, family history and social history reviewed within this encounter.     Review of Systems   Constitutional:  Negative for activity change and unexpected weight change.   HENT:  Negative for trouble swallowing and voice change.    Eyes:  Negative for visual disturbance.   Respiratory:  Negative for shortness of breath.    Cardiovascular:  Negative for chest pain.   Gastrointestinal:  Negative for abdominal pain, diarrhea, nausea and vomiting.   Endocrine: Negative for cold intolerance and heat intolerance.   Musculoskeletal:  Positive for arthralgias.   Skin:  Negative for rash.   Neurological:  Positive for numbness.   Psychiatric/Behavioral:  Negative for agitation. The patient is not nervous/anxious.       /80   Pulse 62   Ht 180.3 cm (71\")   Wt 107 kg (236 lb)   SpO2 98%   BMI 32.92 kg/mý      Physical Exam  Vitals reviewed.   Constitutional:       General: He is not in acute distress.     Appearance: He is obese.   HENT:      Head: Normocephalic.      Nose: Nose normal.   Eyes:      Conjunctiva/sclera: Conjunctivae normal.   Cardiovascular:      Rate and Rhythm: Normal rate.   Pulmonary:      Effort: Pulmonary effort is normal.   Abdominal:      Tenderness: There is no guarding.   Musculoskeletal:         General: Normal range of motion.   Skin:     General: Skin is warm and dry.   Neurological:      Mental Status: He is alert and " oriented to person, place, and time.   Psychiatric:         Mood and Affect: Mood normal.         Behavior: Behavior normal.         Thought Content: Thought content normal.        Labs and images reviewed as noted in the HPI    Assessment and plan:    Diagnoses and all orders for this visit:    1. Type 2 diabetes mellitus with stage 3b chronic kidney disease, with long-term current use of insulin (Primary)  Assessment & Plan:  -Diabetes remains uncontrolled due to hyperglycemia and hypoglycemia  -Complications include known CKD IIIb, CAD, diabetic retinopathy and diabetic polyneuropathy  -Patient remains high risk for complications due to persistent hyperglycemia; counseled that long term hyperglycemia can cause worsening neuropathy, kidney damage, eye damage, and cardiovascular disease   -Agree with reduction of insulin to reduce hypoglycemia  -Continue Tresiba U200 30 units once daily  -Recommend NovoLog 5 units with meals if blood glucose is less than 200 mg/dL and 10 units with meals of blood glucose is greater than 200 mg/dL  -Continue Ozempic 1 mg weekly  -Recommend he check his pills at home to ensure he is no longer taking Jardiance as he was unclear at the visit today  -Continue Dexcom G7 CGM  - intolerant to ACEi/ARB due to CKD; on finerenone; blood pressure today 128/80     DM Health Maintenance:  Ophtho: 5/2/2023 showing primary open-angle glaucoma, mild nonproliferative diabetic retinopathy, age-related cataract of the right eye and posterior vitreous detachment  Monofilament / Foot exam:  Completed 3/1/2023  Lipids/Statin: taking rosuvastatin 20mg daily with last FLP showing LDL 95, , HDL 33,  done 11/23/2022   GABRIEL: not indicated as he is being followed for CKD IIIb  TSH: 1.28 done 12/10/2022  Aspirin: not taking as he takes plavix     Orders:  -     POC Glycosylated Hemoglobin (Hb A1C)  -     Insulin Degludec (Tresiba FlexTouch) 200 UNIT/ML solution pen-injector pen injection; Inject 30u  once daily, titrate for max daily dose 50u  Dispense: 30 mL; Refill: 11  -     insulin aspart (NovoLOG FlexPen) 100 UNIT/ML solution pen-injector sc pen; Inject 5-10u three times daily with meals; if BG is less than 200mg/dl give 5u and if BG is greater than 200mg/dl give 10u; max daily dose 50u  Dispense: 15 mL; Refill: 11  -     Semaglutide, 1 MG/DOSE, (Ozempic, 1 MG/DOSE,) 4 MG/3ML solution pen-injector; Inject 1 mg under the skin into the appropriate area as directed 1 (One) Time Per Week.  Dispense: 9 mL; Refill: 2  -     Continuous Blood Gluc Sensor (Dexcom G7 Sensor) misc; 1 Device Every 10 (Ten) Days.  Dispense: 9 each; Refill: 3    2. Mixed hyperlipidemia  Assessment & Plan:  -Managed by PCP  -Advised that he will need a dose reduction in rosuvastatin if renal function declines         Return in about 3 months (around 11/9/2023) for T2DM.     Electronically signed by: Kyle S Rosenstein, MD

## 2023-08-11 RX ORDER — AMLODIPINE BESYLATE 10 MG/1
TABLET ORAL
Qty: 90 TABLET | Refills: 1 | Status: SHIPPED | OUTPATIENT
Start: 2023-08-11

## 2023-09-12 ENCOUNTER — OFFICE VISIT (OUTPATIENT)
Dept: FAMILY MEDICINE CLINIC | Facility: CLINIC | Age: 78
End: 2023-09-12
Payer: MEDICARE

## 2023-09-12 VITALS
DIASTOLIC BLOOD PRESSURE: 70 MMHG | OXYGEN SATURATION: 98 % | SYSTOLIC BLOOD PRESSURE: 154 MMHG | HEART RATE: 70 BPM | WEIGHT: 232 LBS | HEIGHT: 71 IN | BODY MASS INDEX: 32.48 KG/M2

## 2023-09-12 DIAGNOSIS — M70.22 OLECRANON BURSITIS OF LEFT ELBOW: ICD-10-CM

## 2023-09-12 DIAGNOSIS — M54.2 NECK PAIN: Primary | ICD-10-CM

## 2023-09-12 PROCEDURE — 3077F SYST BP >= 140 MM HG: CPT | Performed by: STUDENT IN AN ORGANIZED HEALTH CARE EDUCATION/TRAINING PROGRAM

## 2023-09-12 PROCEDURE — 3078F DIAST BP <80 MM HG: CPT | Performed by: STUDENT IN AN ORGANIZED HEALTH CARE EDUCATION/TRAINING PROGRAM

## 2023-09-12 PROCEDURE — 99213 OFFICE O/P EST LOW 20 MIN: CPT | Performed by: STUDENT IN AN ORGANIZED HEALTH CARE EDUCATION/TRAINING PROGRAM

## 2023-09-12 RX ORDER — METHOCARBAMOL 500 MG/1
500 TABLET, FILM COATED ORAL 3 TIMES DAILY PRN
Qty: 60 TABLET | Refills: 1 | Status: SHIPPED | OUTPATIENT
Start: 2023-09-12

## 2023-09-12 RX ORDER — PREDNISONE 10 MG/1
20 TABLET ORAL DAILY
Qty: 10 TABLET | Refills: 0 | Status: SHIPPED | OUTPATIENT
Start: 2023-09-12 | End: 2023-09-17

## 2023-09-12 NOTE — PROGRESS NOTES
"Chief Complaint  Neck Pain and Elbow Pain    Stefan Nunez presents to [facility]  History of Present Illness    Patient states that he has been having pain in the neck on the left side for the past few months. He states that he has been having this for some time.  He states that this has started to impair his ability to get to sleep, and is causing him difficulty completing his day-to-day tasks.  He states that the neck pain is bothersome, but he also has elbow pain on the left overlying the olecranon.  He states he has had bursitis in the past, and it feels similar.    Objective   Vital Signs:   /70 (BP Location: Left arm, Patient Position: Sitting, Cuff Size: Large Adult)   Pulse 70   Ht 180.3 cm (71\")   Wt 105 kg (232 lb)   SpO2 98%   BMI 32.36 kg/m²     Body mass index is 32.36 kg/m².    Review of Systems    Past History:  Medical History: has a past medical history of Acute otitis externa, Acute otitis media, Atypical chest pain, Cellulitis, Diabetes mellitus type 1, Dysuria, Esophagitis, Gastritis, Gastroenteritis, Glucosuria, Hyperlipidemia, Hypertension, Hypothyroidism, Insulin dependent diabetes mellitus type IA, Nicotine dependence, Obesity, Penetrating foreign body of skin of index finger, initial encounter, Peripheral vascular disease, and Sinusitis.   Surgical History: has a past surgical history that includes Leg Biopsy (02/01/2009).   Family History: family history includes Developmental delay (age of onset: 74) in his mother; Diabetes (age of onset: 90) in his father; Hypertension (age of onset: 90) in his father.   Social History: reports that he quit smoking about 20 months ago. His smoking use included cigarettes. He started smoking about 47 years ago. He has a 46.00 pack-year smoking history. He has been exposed to tobacco smoke. He has never used smokeless tobacco. He reports that he does not currently use alcohol. Drug use " questions deferred to the physician.      Current Outpatient Medications:     allopurinol (ZYLOPRIM) 100 MG tablet, , Disp: , Rfl:     amLODIPine (NORVASC) 10 MG tablet, TAKE ONE TABLET BY MOUTH DAILY, Disp: 90 tablet, Rfl: 1    bumetanide (BUMEX) 2 MG tablet, Take 1 tablet by mouth Daily., Disp: 30 tablet, Rfl: 1    carvedilol (COREG) 25 MG tablet, , Disp: , Rfl:     chlorthalidone (HYGROTEN) 50 MG tablet, , Disp: , Rfl:     citalopram (CeleXA) 20 MG tablet, Take 1 tablet by mouth Daily., Disp: 90 tablet, Rfl: 1    clopidogrel (PLAVIX) 75 MG tablet, TAKE ONE TABLET BY MOUTH DAILY, Disp: 90 tablet, Rfl: 1    Continuous Blood Gluc Sensor (Dexcom G7 Sensor) misc, 1 Device Every 10 (Ten) Days., Disp: 9 each, Rfl: 3    empagliflozin (JARDIANCE) 10 MG tablet tablet, Take 1 tablet by mouth Daily., Disp: 30 tablet, Rfl: 11    ferrous sulfate 324 MG tablet delayed-release, Take 1 tablet by mouth Daily With Breakfast., Disp: 90 tablet, Rfl: 1    gemfibrozil (LOPID) 600 MG tablet, Take 1 tablet by mouth 2 (Two) Times a Day., Disp: 180 tablet, Rfl: 1    hydrALAZINE (APRESOLINE) 100 MG tablet, Take 1 tablet by mouth 3 (Three) Times a Day., Disp: 270 tablet, Rfl: 1    insulin aspart (NovoLOG FlexPen) 100 UNIT/ML solution pen-injector sc pen, Inject 5-10u three times daily with meals; if BG is less than 200mg/dl give 5u and if BG is greater than 200mg/dl give 10u; max daily dose 50u, Disp: 15 mL, Rfl: 11    Insulin Degludec (Tresiba FlexTouch) 200 UNIT/ML solution pen-injector pen injection, Inject 30u once daily, titrate for max daily dose 50u, Disp: 30 mL, Rfl: 11    Insulin Pen Needle (B-D ULTRAFINE III SHORT PEN) 31G X 8 MM misc, Use as directed up to 6 times per day, Disp: 200 each, Rfl: 11    isosorbide mononitrate (IMDUR) 120 MG 24 hr tablet, , Disp: , Rfl:     methocarbamol (ROBAXIN) 500 MG tablet, Take 1 tablet by mouth 3 (Three) Times a Day As Needed for Muscle Spasms., Disp: 60 tablet, Rfl: 1    pantoprazole (PROTONIX)  40 MG EC tablet, Take 1 tablet by mouth Daily., Disp: 90 tablet, Rfl: 1    predniSONE (DELTASONE) 10 MG tablet, Take 2 tablets by mouth Daily for 5 days., Disp: 10 tablet, Rfl: 0    rosuvastatin (CRESTOR) 20 MG tablet, Take 1 tablet by mouth Daily., Disp: 90 tablet, Rfl: 1    Semaglutide, 1 MG/DOSE, (Ozempic, 1 MG/DOSE,) 4 MG/3ML solution pen-injector, Inject 1 mg under the skin into the appropriate area as directed 1 (One) Time Per Week., Disp: 9 mL, Rfl: 2    Allergies: Moxifloxacin, Niacin, Oxycodone hcl, and Oxycodone-acetaminophen    Physical Exam  Constitutional:       General: He is not in acute distress.     Appearance: He is not ill-appearing or toxic-appearing.   HENT:      Head: Normocephalic and atraumatic.   Cardiovascular:      Rate and Rhythm: Normal rate and regular rhythm.      Heart sounds: No murmur heard.  Pulmonary:      Effort: Pulmonary effort is normal. No respiratory distress.   Neurological:      General: No focal deficit present.      Mental Status: He is alert and oriented to person, place, and time.   Psychiatric:         Mood and Affect: Mood normal.         Thought Content: Thought content normal.        Result Review :                   Assessment and Plan    Diagnoses and all orders for this visit:    1. Neck pain (Primary)    2. Olecranon bursitis of left elbow    Other orders  -     predniSONE (DELTASONE) 10 MG tablet; Take 2 tablets by mouth Daily for 5 days.  Dispense: 10 tablet; Refill: 0  -     methocarbamol (ROBAXIN) 500 MG tablet; Take 1 tablet by mouth 3 (Three) Times a Day As Needed for Muscle Spasms.  Dispense: 60 tablet; Refill: 1    We will do burst of prednisone milligrams daily for 5 days.  We will also do Robaxin to help with muscle spasm.  If no significant improvement over the next 7 to 10 days will bring back for x-ray and send to physical therapy.     Follow Up   No follow-ups on file.  Patient was given instructions and counseling regarding his condition or for  health maintenance advice. Please see specific information pulled into the AVS if appropriate.     Divina Delgado, DO

## 2023-09-19 ENCOUNTER — PATIENT OUTREACH (OUTPATIENT)
Dept: CASE MANAGEMENT | Facility: OTHER | Age: 78
End: 2023-09-19
Payer: MEDICARE

## 2023-09-19 ENCOUNTER — TELEPHONE (OUTPATIENT)
Dept: CASE MANAGEMENT | Facility: OTHER | Age: 78
End: 2023-09-19
Payer: MEDICARE

## 2023-09-19 DIAGNOSIS — E11.22 TYPE 2 DIABETES MELLITUS WITH STAGE 4 CHRONIC KIDNEY DISEASE, WITH LONG-TERM CURRENT USE OF INSULIN: Primary | ICD-10-CM

## 2023-09-19 DIAGNOSIS — N18.4 TYPE 2 DIABETES MELLITUS WITH STAGE 4 CHRONIC KIDNEY DISEASE, WITH LONG-TERM CURRENT USE OF INSULIN: Primary | ICD-10-CM

## 2023-09-19 DIAGNOSIS — I10 ESSENTIAL HYPERTENSION: ICD-10-CM

## 2023-09-19 DIAGNOSIS — Z79.4 TYPE 2 DIABETES MELLITUS WITH STAGE 4 CHRONIC KIDNEY DISEASE, WITH LONG-TERM CURRENT USE OF INSULIN: Primary | ICD-10-CM

## 2023-09-19 NOTE — TELEPHONE ENCOUNTER
Blood pressures are running high. He should bring in his cuff to make sure that it is accurate, as we have discussed this previously. He should also call cardiology to let them know that his blood pressure is elevated and see if they want to have him follow up the office sooner then his scheduled follow up.     Blood sugars are a bit high, but with recent steroids, this is expected.

## 2023-09-19 NOTE — TELEPHONE ENCOUNTER
Patient recorded BP and pulse for PCP review:    Date AM BP AM Pulse PM BP PM Pulse   9/14 167/69 63 197/73 71   9/15 153/71 61 163/59 66   9/16 159/67 60 163/61 67   9/17 159/75 75 177/66 66   9/18 172/71 62 164/69 75       Patient also recorded blood sugar for PCP review:    Date AM blood sugar PM blood sugar   9/14 205 389   9/15 303 167   9/16 185 151   9/17 159 267   9/18 162 215       Do you have any recommendations?

## 2023-09-19 NOTE — OUTREACH NOTE
AMBULATORY CASE MANAGEMENT NOTE    Name and Relationship of Patient/Support Person: Enrique Elzbieta - Self    CCM Interim Update     Discussed hypertension management. Patient states his blood pressure tends to be elevated. He takes cardiac medication as prescribed. Patient wrote down bp values as followed:    Date AM BP AM Pulse PM BP PM Pulse   9/14 167/69 63 197/73 71   9/15 153/71 61 163/59 66   9/16 159/67 60 163/61 67   9/17 159/75 75 177/66 66   9/18 172/71 62 164/69 75     Explained to patient that bp is elevated. He is not experiencing any symptoms of hypertension such as headache or shortness of breath. Patient also states he is not gaining weight or swelling. He does experience shoulder and neck pain that may be contributing to increased values. He is to see orthopedic provider next week regarding pain. Sent values to PCP for review. Patient advised to contact cardiologist to discuss increased BP as well. Suggested that patient should bring bp cuff to next medical visit to ensure that cuff is accurate. Patient instructed to contact ACM if bp is above 170 systolic and seek emergency care if bp is above 180 systolic and/or patient is experiencing difficulties breathing chest pain, or shortness of breath.     Patient also recorded blood sugar for PCP review:     Date AM blood sugar PM blood sugar   9/14 205 389   9/15 303 167   9/16 185 151   9/17 159 267   9/18 162 215      Patient continues to followup with endocrinologist. He states he is taking insulin as prescribed. He believes some of the increased values may be due to prednisone given at recent appointment for shoulder pain. Sent values to PCP. Patient to contact ACM if blood sugar values are above 250 or less than 70.       Adult Patient Profile  Questions/Answers      Flowsheet Row Most Recent Value   Symptoms/Conditions Managed at Home cardiovascular, musculoskeletal, diabetes, type 2   Barriers to Managing Health stress of chronic illness    Cardiovascular Symptoms/Conditions hypertension   Diabetes Management Strategies adequate rest, medication therapy, blood glucose testing, insulin therapy   A1C Target < 7%   Last A1C Result 5.9   Musculoskeletal Symptoms/Conditions joint pain, other (see comments)  [neck and shoulder pain]   Musculoskeletal Management Strategies adequate rest, medication therapy   Identifying Health Goals keep illness under control            Education Documentation  No documentation found.        Lily WADSWORTH  Ambulatory Case Management    9/19/2023, 11:39 EDT

## 2023-09-28 ENCOUNTER — PATIENT OUTREACH (OUTPATIENT)
Dept: CASE MANAGEMENT | Facility: OTHER | Age: 78
End: 2023-09-28
Payer: MEDICARE

## 2023-09-28 DIAGNOSIS — E11.22 TYPE 2 DIABETES MELLITUS WITH STAGE 4 CHRONIC KIDNEY DISEASE, WITH LONG-TERM CURRENT USE OF INSULIN: Primary | ICD-10-CM

## 2023-09-28 DIAGNOSIS — I10 ESSENTIAL HYPERTENSION: ICD-10-CM

## 2023-09-28 DIAGNOSIS — N18.4 TYPE 2 DIABETES MELLITUS WITH STAGE 4 CHRONIC KIDNEY DISEASE, WITH LONG-TERM CURRENT USE OF INSULIN: Primary | ICD-10-CM

## 2023-09-28 DIAGNOSIS — Z79.4 TYPE 2 DIABETES MELLITUS WITH STAGE 4 CHRONIC KIDNEY DISEASE, WITH LONG-TERM CURRENT USE OF INSULIN: Primary | ICD-10-CM

## 2023-09-28 NOTE — OUTREACH NOTE
CCM End of Month Documentation    This Chronic Medical Management Care Plan for Elzbieta Nunez, 78 y.o. male, has been monitored and managed; reviewed and a new plan of care implemented for the month of September.  A cumulative time of 59  minutes was spent on this patient record this month, including face to face with provider; phone call with patient; chart review; electronic communication with primary care provider.    Regarding the patient's problems: has Essential hypertension; Mixed hyperlipidemia; PVD (peripheral vascular disease); and Type 2 diabetes mellitus with stage 3b chronic kidney disease, with long-term current use of insulin on their problem list., the following items were addressed: medical records and any changes can be found within the plan section of the note.  A detailed listing of time spent for chronic care management is tracked within each outreach encounter.  Current medications include:  has a current medication list which includes the following prescription(s): allopurinol, amlodipine, bumetanide, carvedilol, chlorthalidone, citalopram, clopidogrel, dexcom g7 sensor, empagliflozin, ferrous sulfate, gemfibrozil, hydralazine, novolog flexpen, tresiba flextouch, b-d ultrafine iii short pen, isosorbide mononitrate, methocarbamol, pantoprazole, rosuvastatin, and ozempic (1 mg/dose). and the patient is reported to be patient is compliant with medication protocol,  Medications are reported to be non-effective in controlling symptoms and changes have been made to the medication protocol.  All notes on chart for PCP to review.    The patient was monitored remotely for blood pressure; weight; blood glucose.    The patient's physical needs include:  physical healthcare.     The patient's mental support needs include:  not applicable    The patient's cognitive support needs include:  coordination of community providers; health care    The patient's psychosocial support needs include:  continued  support    The patient's functional needs include: not applicable    The patient's environmental needs include:  not applicable    Care Plan overall comments:  No data recorded    Refer to previous outreach notes for more information on the areas listed above.    Monthly Billing Diagnoses  (E11.22,  N18.4,  Z79.4) Type 2 diabetes mellitus with stage 4 chronic kidney disease, with long-term current use of insulin    (I10) Essential hypertension    Medications   Medications have been reconciled    Care Plan progress this month:      Recently Modified Care Plans Updates made since 8/28/2023 12:00 AM      No recently modified care plans.              Instructions   Patient was provided an electronic copy of care plan  CCM services were explained and offered and patient has accepted these services.  Patient has given their written consent to receive CCM services and understands that this includes the authorization of electronic communication of medical information with the other treating providers.  Patient understands that they may stop CCM services at any time and these changes will be effective at the end of the calendar month and will effectively revocate the agreement of CCM services.  Patient understands that only one practitioner can furnish and be paid for CCM services during one calendar month.  Patient also understands that there may be co-payment or deductible fees in association with CCM services.  Patient will continue with at least monthly follow-up calls with the Ambulatory .    Lily WADSWORTH  Ambulatory Case Management    9/28/2023, 11:39 EDT

## 2023-10-20 ENCOUNTER — OFFICE VISIT (OUTPATIENT)
Dept: FAMILY MEDICINE CLINIC | Facility: CLINIC | Age: 78
End: 2023-10-20
Payer: MEDICARE

## 2023-10-20 ENCOUNTER — TELEPHONE (OUTPATIENT)
Dept: FAMILY MEDICINE CLINIC | Facility: CLINIC | Age: 78
End: 2023-10-20

## 2023-10-20 VITALS
HEART RATE: 73 BPM | OXYGEN SATURATION: 96 % | SYSTOLIC BLOOD PRESSURE: 134 MMHG | HEIGHT: 71 IN | BODY MASS INDEX: 33.35 KG/M2 | DIASTOLIC BLOOD PRESSURE: 68 MMHG | WEIGHT: 238.2 LBS

## 2023-10-20 DIAGNOSIS — I10 PRIMARY HYPERTENSION: Primary | ICD-10-CM

## 2023-10-20 DIAGNOSIS — N18.30 CKD STAGE 3 DUE TO TYPE 2 DIABETES MELLITUS: ICD-10-CM

## 2023-10-20 DIAGNOSIS — K21.9 GASTROESOPHAGEAL REFLUX DISEASE, UNSPECIFIED WHETHER ESOPHAGITIS PRESENT: ICD-10-CM

## 2023-10-20 DIAGNOSIS — E78.2 MIXED HYPERLIPIDEMIA: ICD-10-CM

## 2023-10-20 DIAGNOSIS — E11.22 CKD STAGE 3 DUE TO TYPE 2 DIABETES MELLITUS: ICD-10-CM

## 2023-10-20 RX ORDER — ALLOPURINOL 100 MG/1
100 TABLET ORAL DAILY
Qty: 90 TABLET | Refills: 1 | Status: SHIPPED | OUTPATIENT
Start: 2023-10-20

## 2023-10-20 RX ORDER — CARVEDILOL 25 MG/1
25 TABLET ORAL 2 TIMES DAILY WITH MEALS
Qty: 180 TABLET | Refills: 1 | Status: SHIPPED | OUTPATIENT
Start: 2023-10-20

## 2023-10-20 RX ORDER — BUMETANIDE 2 MG/1
2 TABLET ORAL DAILY
Qty: 90 TABLET | Refills: 1 | Status: SHIPPED | OUTPATIENT
Start: 2023-10-20

## 2023-10-20 RX ORDER — CHLORTHALIDONE 50 MG/1
50 TABLET ORAL DAILY
Qty: 90 TABLET | Refills: 1 | Status: SHIPPED | OUTPATIENT
Start: 2023-10-20

## 2023-10-20 RX ORDER — LATANOPROST 50 UG/ML
SOLUTION/ DROPS OPHTHALMIC
COMMUNITY
Start: 2023-08-29

## 2023-10-20 RX ORDER — PANTOPRAZOLE SODIUM 40 MG/1
40 TABLET, DELAYED RELEASE ORAL 2 TIMES DAILY
Qty: 180 TABLET | Refills: 1 | Status: SHIPPED | OUTPATIENT
Start: 2023-10-20

## 2023-10-20 RX ORDER — DORZOLAMIDE HYDROCHLORIDE AND TIMOLOL MALEATE 20; 5 MG/ML; MG/ML
SOLUTION/ DROPS OPHTHALMIC
COMMUNITY
Start: 2023-08-31

## 2023-10-20 NOTE — PROGRESS NOTES
"Chief Complaint  Neck Pain (Pt reports he has been doing therapy for neck and shoulder, states the pain is a little better. ), Hypertension, Hyperlipidemia, Hypothyroidism, and Diabetes (Med recheck)    Subjective          Elzbieta Nunez presents to [facility]  History of Present Illness    He states that overall she is doing well at this time .     He states that he is out of the carvedilol. He is needing a refill today.  He states he does not check his blood pressure at home, but denies any chest pain, shortness of breath, headache.    He states that he has had some increased weakness over the past year since he was hospitalized and had all of his health issues.  He states that he was working with physical therapy and felt some.        Objective   Vital Signs:   /68   Pulse 73   Ht 180.3 cm (71\")   Wt 108 kg (238 lb 3.2 oz)   SpO2 96%   BMI 33.22 kg/m²     Body mass index is 33.22 kg/m².    Review of Systems    Past History:  Medical History: has a past medical history of Acute otitis externa, Acute otitis media, Atypical chest pain, Cellulitis, Diabetes mellitus type 1, Dysuria, Esophagitis, Gastritis, Gastroenteritis, Glucosuria, Hyperlipidemia, Hypertension, Hypothyroidism, Insulin dependent diabetes mellitus type IA, Nicotine dependence, Obesity, Penetrating foreign body of skin of index finger, initial encounter, Peripheral vascular disease, and Sinusitis.   Surgical History: has a past surgical history that includes Leg Biopsy (02/01/2009).   Family History: family history includes Developmental delay (age of onset: 74) in his mother; Diabetes (age of onset: 90) in his father; Hypertension (age of onset: 90) in his father.   Social History: reports that he quit smoking about 21 months ago. His smoking use included cigarettes. He started smoking about 47 years ago. He has a 46.00 pack-year smoking history. He has been exposed to tobacco smoke. He has never used " smokeless tobacco. He reports that he does not currently use alcohol. Drug use questions deferred to the physician.      Current Outpatient Medications:     allopurinol (ZYLOPRIM) 100 MG tablet, , Disp: , Rfl:     amLODIPine (NORVASC) 10 MG tablet, TAKE ONE TABLET BY MOUTH DAILY, Disp: 90 tablet, Rfl: 1    bumetanide (BUMEX) 2 MG tablet, Take 1 tablet by mouth Daily., Disp: 30 tablet, Rfl: 1    carvedilol (COREG) 25 MG tablet, Take 1 tablet by mouth 2 (Two) Times a Day With Meals., Disp: 180 tablet, Rfl: 1    chlorthalidone (HYGROTEN) 50 MG tablet, , Disp: , Rfl:     citalopram (CeleXA) 20 MG tablet, Take 1 tablet by mouth Daily., Disp: 90 tablet, Rfl: 1    clopidogrel (PLAVIX) 75 MG tablet, TAKE ONE TABLET BY MOUTH DAILY, Disp: 90 tablet, Rfl: 1    Continuous Blood Gluc Sensor (Dexcom G7 Sensor) misc, 1 Device Every 10 (Ten) Days., Disp: 9 each, Rfl: 3    dorzolamide-timolol (COSOPT) 22.3-6.8 MG/ML ophthalmic solution, , Disp: , Rfl:     empagliflozin (JARDIANCE) 10 MG tablet tablet, Take 1 tablet by mouth Daily., Disp: 30 tablet, Rfl: 11    ferrous sulfate 324 MG tablet delayed-release, Take 1 tablet by mouth Daily With Breakfast., Disp: 90 tablet, Rfl: 1    gemfibrozil (LOPID) 600 MG tablet, Take 1 tablet by mouth 2 (Two) Times a Day., Disp: 180 tablet, Rfl: 1    hydrALAZINE (APRESOLINE) 100 MG tablet, Take 1 tablet by mouth 3 (Three) Times a Day., Disp: 270 tablet, Rfl: 1    insulin aspart (NovoLOG FlexPen) 100 UNIT/ML solution pen-injector sc pen, Inject 5-10u three times daily with meals; if BG is less than 200mg/dl give 5u and if BG is greater than 200mg/dl give 10u; max daily dose 50u, Disp: 15 mL, Rfl: 11    Insulin Degludec (Tresiba FlexTouch) 200 UNIT/ML solution pen-injector pen injection, Inject 30u once daily, titrate for max daily dose 50u, Disp: 30 mL, Rfl: 11    Insulin Pen Needle (B-D ULTRAFINE III SHORT PEN) 31G X 8 MM misc, Use as directed up to 6 times per day, Disp: 200 each, Rfl: 11     isosorbide mononitrate (IMDUR) 120 MG 24 hr tablet, , Disp: , Rfl:     latanoprost (XALATAN) 0.005 % ophthalmic solution, , Disp: , Rfl:     methocarbamol (ROBAXIN) 500 MG tablet, Take 1 tablet by mouth 3 (Three) Times a Day As Needed for Muscle Spasms., Disp: 60 tablet, Rfl: 1    pantoprazole (PROTONIX) 40 MG EC tablet, Take 1 tablet by mouth 2 (Two) Times a Day., Disp: 180 tablet, Rfl: 1    rosuvastatin (CRESTOR) 20 MG tablet, Take 1 tablet by mouth Daily., Disp: 90 tablet, Rfl: 1    Semaglutide, 1 MG/DOSE, (Ozempic, 1 MG/DOSE,) 4 MG/3ML solution pen-injector, Inject 1 mg under the skin into the appropriate area as directed 1 (One) Time Per Week., Disp: 9 mL, Rfl: 2    Allergies: Moxifloxacin, Niacin, Oxycodone hcl, and Oxycodone-acetaminophen    Physical Exam  Constitutional:       General: He is not in acute distress.     Appearance: He is not ill-appearing or toxic-appearing.   HENT:      Head: Normocephalic and atraumatic.   Cardiovascular:      Rate and Rhythm: Normal rate and regular rhythm.      Heart sounds: Murmur (stable) heard.   Pulmonary:      Effort: Pulmonary effort is normal. No respiratory distress.   Musculoskeletal:      Right lower leg: No edema.      Left lower leg: No edema.   Neurological:      General: No focal deficit present.      Mental Status: He is alert and oriented to person, place, and time.   Psychiatric:         Mood and Affect: Mood normal.         Thought Content: Thought content normal.          Result Review :                   Assessment and Plan    Diagnoses and all orders for this visit:    1. Primary hypertension (Primary)  -     Comprehensive Metabolic Panel  -     CBC & Differential  -     Hemoglobin A1c  -     Lipid Panel  -     TSH  -     T4, Free    2. CKD stage 3 due to type 2 diabetes mellitus  -     Comprehensive Metabolic Panel  -     CBC & Differential  -     Hemoglobin A1c  -     Lipid Panel  -     TSH  -     T4, Free    3. Gastroesophageal reflux disease,  unspecified whether esophagitis present  -     Comprehensive Metabolic Panel  -     CBC & Differential  -     Hemoglobin A1c  -     Lipid Panel  -     TSH  -     T4, Free  -     pantoprazole (PROTONIX) 40 MG EC tablet; Take 1 tablet by mouth 2 (Two) Times a Day.  Dispense: 180 tablet; Refill: 1    4. Mixed hyperlipidemia  -     Comprehensive Metabolic Panel  -     CBC & Differential  -     Hemoglobin A1c  -     Lipid Panel  -     TSH  -     T4, Free    Other orders  -     Fluzone High-Dose 65+yrs (6815-6567)  -     Pneumococcal Conjugate Vaccine 20-Valent (PCV20)  -     carvedilol (COREG) 25 MG tablet; Take 1 tablet by mouth 2 (Two) Times a Day With Meals.  Dispense: 180 tablet; Refill: 1    Blood work ordered and will contact with results when available. Will adjust medications based on abnormalities seen.     Flu and Prevnar 20 given in the office.     Medications refilled at this time.     Call with any new or worsening symptoms.     Follow up in 6 months for AWV.     Follow Up   No follow-ups on file.  Patient was given instructions and counseling regarding his condition or for health maintenance advice. Please see specific information pulled into the AVS if appropriate.     Divina Delgado, DO

## 2023-10-20 NOTE — TELEPHONE ENCOUNTER
Caller: Elzbieta Nunez    Relationship: Self    Best call back number:  [phone]     What is the best time to reach you: ANYTIME    Who are you requesting to speak with (clinical staff, provider,  specific staff member): DO COUTTS/CLINICAL    Do you know the name of the person who called: NO    What was the call regarding: PATIENT SAID HE DOESN'T KNOW. NO VOICEMAIL WAS LEFT.

## 2023-10-21 LAB
ALBUMIN SERPL-MCNC: 4.6 G/DL (ref 3.8–4.8)
ALBUMIN/GLOB SERPL: 1.8 {RATIO} (ref 1.2–2.2)
ALP SERPL-CCNC: 62 IU/L (ref 44–121)
ALT SERPL-CCNC: 8 IU/L (ref 0–44)
AST SERPL-CCNC: 15 IU/L (ref 0–40)
BASOPHILS # BLD AUTO: 0 X10E3/UL (ref 0–0.2)
BASOPHILS NFR BLD AUTO: 0 %
BILIRUB SERPL-MCNC: 0.2 MG/DL (ref 0–1.2)
BUN SERPL-MCNC: 32 MG/DL (ref 8–27)
BUN/CREAT SERPL: 18 (ref 10–24)
CALCIUM SERPL-MCNC: 9.1 MG/DL (ref 8.6–10.2)
CHLORIDE SERPL-SCNC: 106 MMOL/L (ref 96–106)
CHOLEST SERPL-MCNC: 204 MG/DL (ref 100–199)
CO2 SERPL-SCNC: 20 MMOL/L (ref 20–29)
CREAT SERPL-MCNC: 1.78 MG/DL (ref 0.76–1.27)
EGFRCR SERPLBLD CKD-EPI 2021: 39 ML/MIN/1.73
EOSINOPHIL # BLD AUTO: 0.1 X10E3/UL (ref 0–0.4)
EOSINOPHIL NFR BLD AUTO: 2 %
ERYTHROCYTE [DISTWIDTH] IN BLOOD BY AUTOMATED COUNT: 14.1 % (ref 11.6–15.4)
GLOBULIN SER CALC-MCNC: 2.6 G/DL (ref 1.5–4.5)
GLUCOSE SERPL-MCNC: 166 MG/DL (ref 70–99)
HBA1C MFR BLD: 7.1 % (ref 4.8–5.6)
HCT VFR BLD AUTO: 31.8 % (ref 37.5–51)
HDLC SERPL-MCNC: 41 MG/DL
HGB BLD-MCNC: 10.7 G/DL (ref 13–17.7)
IMM GRANULOCYTES # BLD AUTO: 0 X10E3/UL (ref 0–0.1)
IMM GRANULOCYTES NFR BLD AUTO: 0 %
LDLC SERPL CALC-MCNC: 139 MG/DL (ref 0–99)
LYMPHOCYTES # BLD AUTO: 1.7 X10E3/UL (ref 0.7–3.1)
LYMPHOCYTES NFR BLD AUTO: 33 %
MCH RBC QN AUTO: 32.3 PG (ref 26.6–33)
MCHC RBC AUTO-ENTMCNC: 33.6 G/DL (ref 31.5–35.7)
MCV RBC AUTO: 96 FL (ref 79–97)
MONOCYTES # BLD AUTO: 0.8 X10E3/UL (ref 0.1–0.9)
MONOCYTES NFR BLD AUTO: 15 %
NEUTROPHILS # BLD AUTO: 2.6 X10E3/UL (ref 1.4–7)
NEUTROPHILS NFR BLD AUTO: 50 %
PLATELET # BLD AUTO: 364 X10E3/UL (ref 150–450)
POTASSIUM SERPL-SCNC: 4.4 MMOL/L (ref 3.5–5.2)
PROT SERPL-MCNC: 7.2 G/DL (ref 6–8.5)
RBC # BLD AUTO: 3.31 X10E6/UL (ref 4.14–5.8)
SODIUM SERPL-SCNC: 145 MMOL/L (ref 134–144)
T4 FREE SERPL-MCNC: 0.84 NG/DL (ref 0.82–1.77)
TRIGL SERPL-MCNC: 135 MG/DL (ref 0–149)
TSH SERPL DL<=0.005 MIU/L-ACNC: 2.2 UIU/ML (ref 0.45–4.5)
VLDLC SERPL CALC-MCNC: 24 MG/DL (ref 5–40)
WBC # BLD AUTO: 5.2 X10E3/UL (ref 3.4–10.8)

## 2023-10-27 ENCOUNTER — PATIENT OUTREACH (OUTPATIENT)
Dept: CASE MANAGEMENT | Facility: OTHER | Age: 78
End: 2023-10-27
Payer: MEDICARE

## 2023-10-27 DIAGNOSIS — Z79.4 TYPE 2 DIABETES MELLITUS WITH STAGE 4 CHRONIC KIDNEY DISEASE, WITH LONG-TERM CURRENT USE OF INSULIN: Primary | ICD-10-CM

## 2023-10-27 DIAGNOSIS — N18.4 TYPE 2 DIABETES MELLITUS WITH STAGE 4 CHRONIC KIDNEY DISEASE, WITH LONG-TERM CURRENT USE OF INSULIN: Primary | ICD-10-CM

## 2023-10-27 DIAGNOSIS — E11.22 TYPE 2 DIABETES MELLITUS WITH STAGE 4 CHRONIC KIDNEY DISEASE, WITH LONG-TERM CURRENT USE OF INSULIN: Primary | ICD-10-CM

## 2023-10-27 DIAGNOSIS — I10 ESSENTIAL HYPERTENSION: ICD-10-CM

## 2023-10-27 NOTE — OUTREACH NOTE
AMBULATORY CASE MANAGEMENT NOTE    Name and Relationship of Patient/Support Person: Jessica Nunez - Spouse    CCM Interim Update    Spoke to patient's wife regarding patient's care. Wife states that patient experienced shortness of breath and chest pain on Wednesday and Thursday of this week. His symptoms are no longer present. Wife reports he checks bp, pulse, and oxygen daily and records values. Wife does not have access to values. Discussed finding with PCP. Advised wife to contact cardiology office regarding recent symptoms. Also advised spouse to take patient to emergency room if shortness of breath, chest pain, or difficulty breathing occur again. Wife verbalized understanding.       Adult Patient Profile  Questions/Answers      Flowsheet Row Most Recent Value   Symptoms/Conditions Managed at Home cardiovascular, respiratory   Barriers to Managing Health stress of chronic illness, understanding health advice   Cardiovascular Symptoms/Conditions chest pain   Cardiovascular Management Strategies adequate rest, medication therapy   Respiratory Symptoms/Conditions shortness of breath            Education Documentation  Unresolved/Worsening Symptoms, taught by Lily Hermosillo RN at 10/27/2023  2:55 PM.  Learner: Patient  Readiness: Acceptance  Method: Explanation  Response: Verbalizes Understanding  Comment: discussed cardiac disorders. Educated wife on when patient should seek emergency care.    Self-Care, taught by Lily Hermosillo, ABDULKADIR at 10/27/2023  2:55 PM.  Learner: Patient  Readiness: Acceptance  Method: Explanation  Response: Verbalizes Understanding  Comment: discussed cardiac disorders. Educated wife on when patient should seek emergency care.    Provider Follow-Up, taught by Lily Hermosillo RN at 10/27/2023  2:55 PM.  Learner: Patient  Readiness: Acceptance  Method: Explanation  Response: Verbalizes Understanding  Comment: discussed cardiac disorders. Educated wife on when patient should seek emergency  care.    Medication Management, taught by Lily Hermosillo RN at 10/27/2023  2:55 PM.  Learner: Patient  Readiness: Acceptance  Method: Explanation  Response: Verbalizes Understanding  Comment: discussed cardiac disorders. Educated wife on when patient should seek emergency care.    Blood Pressure Monitoring, taught by Lily Hermosillo RN at 10/27/2023  2:55 PM.  Learner: Patient  Readiness: Acceptance  Method: Explanation  Response: Verbalizes Understanding  Comment: discussed cardiac disorders. Educated wife on when patient should seek emergency care.    Risk Factors, taught by Lily Hermosillo RN at 10/27/2023  2:55 PM.  Learner: Patient  Readiness: Acceptance  Method: Explanation  Response: Verbalizes Understanding  Comment: discussed cardiac disorders. Educated wife on when patient should seek emergency care.    Description, taught by Lily Hermosillo RN at 10/27/2023  2:55 PM.  Learner: Patient  Readiness: Acceptance  Method: Explanation  Response: Verbalizes Understanding  Comment: discussed cardiac disorders. Educated wife on when patient should seek emergency care.          Lily WADSWORTH  Ambulatory Case Management    10/27/2023, 14:57 EDT

## 2023-10-30 ENCOUNTER — PATIENT OUTREACH (OUTPATIENT)
Dept: CASE MANAGEMENT | Facility: OTHER | Age: 78
End: 2023-10-30
Payer: MEDICARE

## 2023-10-30 DIAGNOSIS — N18.4 TYPE 2 DIABETES MELLITUS WITH STAGE 4 CHRONIC KIDNEY DISEASE, WITH LONG-TERM CURRENT USE OF INSULIN: Primary | ICD-10-CM

## 2023-10-30 DIAGNOSIS — I10 ESSENTIAL HYPERTENSION: ICD-10-CM

## 2023-10-30 DIAGNOSIS — Z79.4 TYPE 2 DIABETES MELLITUS WITH STAGE 4 CHRONIC KIDNEY DISEASE, WITH LONG-TERM CURRENT USE OF INSULIN: Primary | ICD-10-CM

## 2023-10-30 DIAGNOSIS — E11.22 TYPE 2 DIABETES MELLITUS WITH STAGE 4 CHRONIC KIDNEY DISEASE, WITH LONG-TERM CURRENT USE OF INSULIN: Primary | ICD-10-CM

## 2023-10-30 NOTE — OUTREACH NOTE
CCM End of Month Documentation    This Chronic Medical Management Care Plan for Elzbieta Nunez, 78 y.o. male, has been monitored and managed; reviewed and a new plan of care implemented for the month of October.  A cumulative time of 21  minutes was spent on this patient record this month, including face to face with provider; chart review; phone call with relative.    Regarding the patient's problems: has Essential hypertension; Mixed hyperlipidemia; PVD (peripheral vascular disease); and Type 2 diabetes mellitus with stage 3b chronic kidney disease, with long-term current use of insulin on their problem list., the following items were addressed: medical records and any changes can be found within the plan section of the note.  A detailed listing of time spent for chronic care management is tracked within each outreach encounter.  Current medications include:  has a current medication list which includes the following prescription(s): allopurinol, amlodipine, bumetanide, carvedilol, chlorthalidone, citalopram, clopidogrel, dexcom g7 sensor, dorzolamide-timolol, empagliflozin, ferrous sulfate, gemfibrozil, hydralazine, novolog flexpen, tresiba flextouch, b-d ultrafine iii short pen, isosorbide mononitrate, latanoprost, methocarbamol, pantoprazole, rosuvastatin, and ozempic (1 mg/dose). and the patient is reported to be patient is compliant with medication protocol,  Medications are reported to be non-effective in controlling symptoms and changes have been made to the medication protocol.  All notes on chart for PCP to review.    The patient was monitored remotely for blood pressure; weight; blood glucose.    The patient's physical needs include:  physical healthcare.     The patient's mental support needs include:  not applicable    The patient's cognitive support needs include:  coordination of community providers; health care    The patient's psychosocial support needs include:  continued support    The patient's  functional needs include: not applicable    The patient's environmental needs include:  not applicable    Care Plan overall comments:  No data recorded    Refer to previous outreach notes for more information on the areas listed above.    Monthly Billing Diagnoses  (E11.22,  N18.4,  Z79.4) Type 2 diabetes mellitus with stage 4 chronic kidney disease, with long-term current use of insulin    (I10) Essential hypertension    Medications   Medications have been reconciled    Care Plan progress this month:      Recently Modified Care Plans Updates made since 9/29/2023 12:00 AM      No recently modified care plans.            Instructions   Patient was provided an electronic copy of care plan  CCM services were explained and offered and patient has accepted these services.  Patient has given their written consent to receive CCM services and understands that this includes the authorization of electronic communication of medical information with the other treating providers.  Patient understands that they may stop CCM services at any time and these changes will be effective at the end of the calendar month and will effectively revocate the agreement of CCM services.  Patient understands that only one practitioner can furnish and be paid for CCM services during one calendar month.  Patient also understands that there may be co-payment or deductible fees in association with CCM services.  Patient will continue with at least monthly follow-up calls with the Ambulatory .    Lily WADSWORTH  Ambulatory Case Management    10/30/2023, 09:14 EDT

## 2023-11-09 ENCOUNTER — PATIENT OUTREACH (OUTPATIENT)
Dept: CASE MANAGEMENT | Facility: OTHER | Age: 78
End: 2023-11-09
Payer: MEDICARE

## 2023-11-09 DIAGNOSIS — N18.4 TYPE 2 DIABETES MELLITUS WITH STAGE 4 CHRONIC KIDNEY DISEASE, WITH LONG-TERM CURRENT USE OF INSULIN: Primary | ICD-10-CM

## 2023-11-09 DIAGNOSIS — E11.22 TYPE 2 DIABETES MELLITUS WITH STAGE 4 CHRONIC KIDNEY DISEASE, WITH LONG-TERM CURRENT USE OF INSULIN: Primary | ICD-10-CM

## 2023-11-09 DIAGNOSIS — I10 ESSENTIAL HYPERTENSION: ICD-10-CM

## 2023-11-09 DIAGNOSIS — Z79.4 TYPE 2 DIABETES MELLITUS WITH STAGE 4 CHRONIC KIDNEY DISEASE, WITH LONG-TERM CURRENT USE OF INSULIN: Primary | ICD-10-CM

## 2023-11-09 NOTE — OUTREACH NOTE
AMBULATORY CASE MANAGEMENT NOTE    Name and Relationship of Patient/Support Person:  -     [facility] Interim Update    Discussed recent chest pain and shortness of breath. Patient states he is no longer experiencing chest pain. He has some shortness of breath during activity. He states cardiologist is aware. He has follow-up appointment on 12/7/23. Patient advised to contact ACM if symptoms worsen. Also advised patient to seek emergency care if he has increased chest pain, shortness of breath, or difficulty breathing. Patient verbalized understanding.    Discussed hypertension management. Patient states his bp is about the same but he has not checked in a while. Last time he checked, he believes it was 160's/60's. Patient advised to check bp daily and contact ACM if systolic bp is greater than 160 or less than 90. Will retrieve bp readings next call. Patient states he does not have swelling in legs or ankles.     Discussed diabetes management. Patient reports his blood sugar was 172 today. He remains under 200. Patient commended for continually checking blood sugar. Educated patient about the importance of healthy diet and exercise as tolerated.    Patient reports he continues to have neck pain. He takes tylenol daily and uses heat to manage pain. Advised patient to alternate heat/cold therapy to reduce pain/swelling. Also advised utilizing Biofreeze to help with stiffness. Patient to contact ACM if pain worsens.         Adult Patient Profile  Questions/Answers      Flowsheet Row Most Recent Value   Symptoms/Conditions Managed at Home musculoskeletal   Musculoskeletal Symptoms/Conditions joint pain  [neck pain]   Identifying Health Goals keep illness under control            Education Documentation  Unresolved/Worsening Symptoms, taught by Lily Hermosillo RN at 11/9/2023  3:21 PM.  Learner: Patient  Readiness: Acceptance  Method: Explanation  Response: Verbalizes Understanding  Comment: Discussed chronic neck pain and  treatment options.    Signs of Medication Tolerance, taught by Lily Hermosillo RN at 11/9/2023  3:21 PM.  Learner: Patient  Readiness: Acceptance  Method: Explanation  Response: Verbalizes Understanding  Comment: Discussed chronic neck pain and treatment options.    Sleep/Rest, taught by Lily Hermosillo RN at 11/9/2023  3:21 PM.  Learner: Patient  Readiness: Acceptance  Method: Explanation  Response: Verbalizes Understanding  Comment: Discussed chronic neck pain and treatment options.    Pain Self-Advocacy, taught by Lily Hermosillo RN at 11/9/2023  3:21 PM.  Learner: Patient  Readiness: Acceptance  Method: Explanation  Response: Verbalizes Understanding  Comment: Discussed chronic neck pain and treatment options.    Nonpharmacologic Pain Management, taught by Lily Hermosillo RN at 11/9/2023  3:21 PM.  Learner: Patient  Readiness: Acceptance  Method: Explanation  Response: Verbalizes Understanding  Comment: Discussed chronic neck pain and treatment options.    Medication Management, taught by Lily Hermosillo RN at 11/9/2023  3:21 PM.  Learner: Patient  Readiness: Acceptance  Method: Explanation  Response: Verbalizes Understanding  Comment: Discussed chronic neck pain and treatment options.    Coping Strategies, taught by Lily Hermosillo RN at 11/9/2023  3:21 PM.  Learner: Patient  Readiness: Acceptance  Method: Explanation  Response: Verbalizes Understanding  Comment: Discussed chronic neck pain and treatment options.    Activity, taught by Lily Hermosillo RN at 11/9/2023  3:21 PM.  Learner: Patient  Readiness: Acceptance  Method: Explanation  Response: Verbalizes Understanding  Comment: Discussed chronic neck pain and treatment options.    Signs/Symptoms, taught by Lily Hermosillo RN at 11/9/2023  3:21 PM.  Learner: Patient  Readiness: Acceptance  Method: Explanation  Response: Verbalizes Understanding  Comment: Discussed chronic neck pain and treatment options.    Risk Factors, taught by Lily Hermosillo  RN at 11/9/2023  3:21 PM.  Learner: Patient  Readiness: Acceptance  Method: Explanation  Response: Verbalizes Understanding  Comment: Discussed chronic neck pain and treatment options.          Lily WADSWORTH  Ambulatory Case Management    11/9/2023, 15:21 EST

## 2023-11-29 ENCOUNTER — PATIENT OUTREACH (OUTPATIENT)
Dept: CASE MANAGEMENT | Facility: OTHER | Age: 78
End: 2023-11-29
Payer: MEDICARE

## 2023-11-29 DIAGNOSIS — I10 ESSENTIAL HYPERTENSION: ICD-10-CM

## 2023-11-29 DIAGNOSIS — N18.4 TYPE 2 DIABETES MELLITUS WITH STAGE 4 CHRONIC KIDNEY DISEASE, WITH LONG-TERM CURRENT USE OF INSULIN: Primary | ICD-10-CM

## 2023-11-29 DIAGNOSIS — Z79.4 TYPE 2 DIABETES MELLITUS WITH STAGE 4 CHRONIC KIDNEY DISEASE, WITH LONG-TERM CURRENT USE OF INSULIN: Primary | ICD-10-CM

## 2023-11-29 DIAGNOSIS — E11.22 TYPE 2 DIABETES MELLITUS WITH STAGE 4 CHRONIC KIDNEY DISEASE, WITH LONG-TERM CURRENT USE OF INSULIN: Primary | ICD-10-CM

## 2023-11-29 NOTE — OUTREACH NOTE
CCM End of Month Documentation    This Chronic Medical Management Care Plan for Elzbieta Nunez, 78 y.o. male, has been monitored and managed; reviewed and a new plan of care implemented for the month of November.  A cumulative time of 24  minutes was spent on this patient record this month, including chart review; phone call with patient.    Regarding the patient's problems: has Essential hypertension; Mixed hyperlipidemia; PVD (peripheral vascular disease); and Type 2 diabetes mellitus with stage 3b chronic kidney disease, with long-term current use of insulin on their problem list., the following items were addressed: medical records and any changes can be found within the plan section of the note.  A detailed listing of time spent for chronic care management is tracked within each outreach encounter.  Current medications include:  has a current medication list which includes the following prescription(s): allopurinol, amlodipine, bumetanide, carvedilol, chlorthalidone, citalopram, clopidogrel, dexcom g7 sensor, dorzolamide-timolol, empagliflozin, ferrous sulfate, gemfibrozil, hydralazine, novolog flexpen, tresiba flextouch, b-d ultrafine iii short pen, isosorbide mononitrate, latanoprost, methocarbamol, pantoprazole, rosuvastatin, and ozempic (1 mg/dose). and the patient is reported to be patient is compliant with medication protocol,  Medications are reported to be non-effective in controlling symptoms and changes have been made to the medication protocol. All notes on chart for PCP to review.    The patient was monitored remotely for blood pressure; weight; blood glucose.    The patient's physical needs include:  physical healthcare.     The patient's mental support needs include:  not applicable    The patient's cognitive support needs include:  coordination of community providers; health care    The patient's psychosocial support needs include:  continued support    The patient's functional needs include:  not applicable    The patient's environmental needs include:  not applicable    Care Plan overall comments:  No data recorded    Refer to previous outreach notes for more information on the areas listed above.    Monthly Billing Diagnoses  (E11.22,  N18.4,  Z79.4) Type 2 diabetes mellitus with stage 4 chronic kidney disease, with long-term current use of insulin    (I10) Essential hypertension    Medications   Medications have been reconciled    Care Plan progress this month:      Recently Modified Care Plans Updates made since 10/29/2023 12:00 AM      No recently modified care plans.              Instructions   Patient was provided an electronic copy of care plan  CCM services were explained and offered and patient has accepted these services.  Patient has given their written consent to receive CCM services and understands that this includes the authorization of electronic communication of medical information with the other treating providers.  Patient understands that they may stop CCM services at any time and these changes will be effective at the end of the calendar month and will effectively revocate the agreement of CCM services.  Patient understands that only one practitioner can furnish and be paid for CCM services during one calendar month.  Patient also understands that there may be co-payment or deductible fees in association with CCM services.  Patient will continue with at least monthly follow-up calls with the Ambulatory .    Lily WADSWORTH  Ambulatory Case Management    11/29/2023, 11:53 EST

## 2023-12-12 RX ORDER — GEMFIBROZIL 600 MG/1
600 TABLET, FILM COATED ORAL 2 TIMES DAILY
Qty: 180 TABLET | Refills: 1 | Status: SHIPPED | OUTPATIENT
Start: 2023-12-12

## 2023-12-15 RX ORDER — ROSUVASTATIN CALCIUM 20 MG/1
20 TABLET, COATED ORAL DAILY
Qty: 90 TABLET | Refills: 1 | Status: SHIPPED | OUTPATIENT
Start: 2023-12-15

## 2023-12-27 RX ORDER — FERROUS SULFATE 324(65)MG
324 TABLET, DELAYED RELEASE (ENTERIC COATED) ORAL
Qty: 90 TABLET | Refills: 1 | Status: SHIPPED | OUTPATIENT
Start: 2023-12-27

## 2024-01-10 NOTE — PROGRESS NOTES
Chief complaint/Reason for consult: T2DM    HPI: Mr. Nunez is a 78yoM with T2DM, hypertension, hyperlipidemia, PVD and GERD who comes for follow-up of T2DM. Since lat visit on 8/9/2023 he reports since that time undergoing surgery on his face for basal cell carcinoma.(left cheek).     Patient is very haphazard with his insulin administration.  He does not know exactly how much insulin he has been taking.  Reports he will occasionally just randomly turn the insulin pen and take some insulin.  To the best of my knowledge the records below are accurate.     # [id number], uncontrolled due to hyperglycemia and hypoglycemia with complications  # CKD IIIb   # CAD with heart failure   # Diabetic retinopathy  # Diabetic polyneuropathy  - Diagnosed: ~1995 years ago   - Current regimen includes: Ozempic 2 mg weekly, Tresiba (U-200) 20-30u twice daily, Novolog 10-12u twice per day and Jardiance 10mg daily     - Denies nausea, vomiting, abdominal pain; has occasional mild constipation  - Compliance with medication is poor   - HbA1c: 7.1% 10/20/2023 (unreliable due to anemia)   - Using Dexcom G7    CGM Download  -Dates reviewed: 12/29/2023-1/11/2024  -Data: 93% wear time, average glucose 167 mg/dL, standard deviation 47 mg/dL, GMI 7.3%, 6% very high, 20% high, 65% time in range, less than 1% low and 0% very low  -Interpretation: Good fasting glycemic control, postprandial hyperglycemia worse after supper     - Takes prednisone for gout occasionally, none recently  - Hypoglycemia occurs rarely, typically if he takes too much insulin   - Patient has symptoms with lows   - Hyperglycemia occurs with missed bolus insulin and poor diet choices   - Patient reports neuropathy that is stable in hands and feet   - Patient denies gastroparesis   - Patient reports rotating injection sites, uses abdomen  - Patient with known ASCVD   - intolerant to ACEi/ARB due to CKD with hyperkalemia; blood pressure today 130/72     DM Health  "Maintenance:  Ophtho: 5/2/2023 showing primary open-angle glaucoma, mild nonproliferative diabetic retinopathy, age-related cataract of the right eye and posterior vitreous detachment  Monofilament / Foot exam: Completed 3/1/2023  Lipids/Statin: taking rosuvastatin 20mg daily  GABRIEL: not indicated as he is being followed for CKD IIIb  TSH: 1.28 done 12/10/2022  Aspirin: not taking as he takes plavix      # Mixed hyperlipidemia   - Currently taking gemfibrozil 600 mg twice daily and rosuvastatin 20 mg daily; managed by PCP  - FLP done 10/20/2023 with total cholesterol 204, HDL 41,  and triglycerides 135  - Has known CKD IIIb     Past medical history, past surgical history, family history and social history reviewed within this encounter.     Review of Systems   Constitutional:  Negative for activity change and unexpected weight change (weight loss with Ozempic).   HENT:  Negative for trouble swallowing and voice change.    Eyes:  Negative for visual disturbance.   Respiratory:  Negative for shortness of breath.    Cardiovascular:  Negative for chest pain.   Gastrointestinal:  Negative for abdominal pain.   Endocrine: Negative for cold intolerance and heat intolerance.   Genitourinary:  Negative for dysuria.   Musculoskeletal:  Positive for arthralgias. Negative for gait problem.   Skin:  Positive for wound.   Neurological:  Positive for numbness.   Psychiatric/Behavioral:  Negative for agitation and confusion.         /72   Pulse 66   Ht 180.3 cm (71\")   Wt 105 kg (231 lb)   SpO2 97%   BMI 32.22 kg/m²      Physical Exam  Vitals reviewed.   Constitutional:       General: He is not in acute distress.  HENT:      Head: Normocephalic.      Nose: Nose normal.   Eyes:      Conjunctiva/sclera: Conjunctivae normal.   Cardiovascular:      Rate and Rhythm: Normal rate.   Pulmonary:      Effort: Pulmonary effort is normal.   Abdominal:      Tenderness: There is no guarding.   Musculoskeletal:         General: No " deformity.   Skin:     General: Skin is warm and dry.      Comments: Large bandage on left cheek   Neurological:      Mental Status: He is alert and oriented to person, place, and time.   Psychiatric:         Mood and Affect: Mood normal.         Behavior: Behavior normal.         Thought Content: Thought content normal.        Labs and images reviewed as noted in the HPI    Assessment and plan:    Diagnoses and all orders for this visit:    1. Type 2 diabetes mellitus with stage 3b chronic kidney disease, with long-term current use of insulin (Primary)  Assessment & Plan:  -Diabetes remains uncontrolled due to hyperglycemia and hypoglycemia  -Complications include known diabetic retinopathy, diabetic polyneuropathy, CAD with heart failure and CKD 3b  -Patient remains high risk for complications due to persistent hyperglycemia; counseled that long term hyperglycemia can cause worsening neuropathy, kidney damage, eye damage, and cardiovascular disease  -Counseled on the importance of giving consistent insulin dosing, currently he is getting too high of insulin dose when he does give his shots and skipping doses due to low blood glucoses  -Goal glucose would be 120-160 mg/dl   -Continue Dexcom G7 CGM  -Recommend consistently taking Tresiba U200 20 units twice daily; he prefers to take it twice daily as he thinks it works better; if he has fasting hypoglycemia recommend reduction in dose  -Recommend consistently taking NovoLog 8 units with meals if blood glucose is less than 212 units with meals of blood glucoses above 200  -Continue Ozempic 2 mg weekly  -Continue Jardiance 10 mg daily, unclear how much glycemic impact this will have with his advanced kidney disease  -intolerant to ACEi/ARB due to CKD with hyperkalemia; blood pressure today 130/72     DM Health Maintenance:  Ophtho: 5/2/2023 showing primary open-angle glaucoma, mild nonproliferative diabetic retinopathy, age-related cataract of the right eye and  posterior vitreous detachment  Monofilament / Foot exam: Completed 3/1/2023  Lipids/Statin: taking rosuvastatin 20mg daily; LDL is not at goal, managed by PCP  GABRIEL: not indicated as he is being followed for CKD IIIb  TSH: 1.28 done 12/10/2022  Aspirin: not taking as he takes plavix     Orders:  -     Semaglutide, 2 MG/DOSE, (Ozempic, 2 MG/DOSE,) 8 MG/3ML solution pen-injector; Inject 2 mg under the skin into the appropriate area as directed 1 (One) Time Per Week.  Dispense: 9 mL; Refill: 3  -     Insulin Degludec (Tresiba FlexTouch) 200 UNIT/ML solution pen-injector pen injection; Inject 20u twice daily, titrate for max daily dose 50u  Dispense: 30 mL; Refill: 11  -     insulin aspart (NovoLOG FlexPen) 100 UNIT/ML solution pen-injector sc pen; Inject 8u with meals if BG < 200mg/dl and 12u with meals in BG>200; max daily dose 50u  Dispense: 15 mL; Refill: 11  -     empagliflozin (JARDIANCE) 10 MG tablet tablet; Take 1 tablet by mouth Daily.  Dispense: 30 tablet; Refill: 11         Return in about 3 months (around 4/11/2024) for T2DM.     Electronically signed by: Kyle S Rosenstein, MD

## 2024-01-11 ENCOUNTER — OFFICE VISIT (OUTPATIENT)
Dept: ENDOCRINOLOGY | Facility: CLINIC | Age: 79
End: 2024-01-11
Payer: MEDICARE

## 2024-01-11 ENCOUNTER — PATIENT OUTREACH (OUTPATIENT)
Dept: CASE MANAGEMENT | Facility: OTHER | Age: 79
End: 2024-01-11
Payer: MEDICARE

## 2024-01-11 ENCOUNTER — OFFICE VISIT (OUTPATIENT)
Dept: FAMILY MEDICINE CLINIC | Facility: CLINIC | Age: 79
End: 2024-01-11
Payer: MEDICARE

## 2024-01-11 VITALS
HEIGHT: 71 IN | WEIGHT: 231 LBS | SYSTOLIC BLOOD PRESSURE: 130 MMHG | BODY MASS INDEX: 32.34 KG/M2 | HEART RATE: 66 BPM | DIASTOLIC BLOOD PRESSURE: 72 MMHG | OXYGEN SATURATION: 97 %

## 2024-01-11 VITALS
HEIGHT: 71 IN | HEART RATE: 65 BPM | OXYGEN SATURATION: 95 % | BODY MASS INDEX: 32.2 KG/M2 | WEIGHT: 230 LBS | DIASTOLIC BLOOD PRESSURE: 58 MMHG | SYSTOLIC BLOOD PRESSURE: 116 MMHG

## 2024-01-11 DIAGNOSIS — N18.32 TYPE 2 DIABETES MELLITUS WITH STAGE 3B CHRONIC KIDNEY DISEASE, WITH LONG-TERM CURRENT USE OF INSULIN: Primary | ICD-10-CM

## 2024-01-11 DIAGNOSIS — Z79.4 TYPE 2 DIABETES MELLITUS WITH STAGE 4 CHRONIC KIDNEY DISEASE, WITH LONG-TERM CURRENT USE OF INSULIN: Primary | ICD-10-CM

## 2024-01-11 DIAGNOSIS — E11.22 CKD STAGE 3 DUE TO TYPE 2 DIABETES MELLITUS: ICD-10-CM

## 2024-01-11 DIAGNOSIS — E11.22 TYPE 2 DIABETES MELLITUS WITH STAGE 4 CHRONIC KIDNEY DISEASE, WITH LONG-TERM CURRENT USE OF INSULIN: Primary | ICD-10-CM

## 2024-01-11 DIAGNOSIS — E11.22 TYPE 2 DIABETES MELLITUS WITH STAGE 3B CHRONIC KIDNEY DISEASE, WITH LONG-TERM CURRENT USE OF INSULIN: Primary | ICD-10-CM

## 2024-01-11 DIAGNOSIS — I10 PRIMARY HYPERTENSION: Primary | ICD-10-CM

## 2024-01-11 DIAGNOSIS — Z79.4 TYPE 2 DIABETES MELLITUS WITH STAGE 3B CHRONIC KIDNEY DISEASE, WITH LONG-TERM CURRENT USE OF INSULIN: Primary | ICD-10-CM

## 2024-01-11 DIAGNOSIS — N18.4 TYPE 2 DIABETES MELLITUS WITH STAGE 4 CHRONIC KIDNEY DISEASE, WITH LONG-TERM CURRENT USE OF INSULIN: Primary | ICD-10-CM

## 2024-01-11 DIAGNOSIS — N18.30 CKD STAGE 3 DUE TO TYPE 2 DIABETES MELLITUS: ICD-10-CM

## 2024-01-11 DIAGNOSIS — I10 ESSENTIAL HYPERTENSION: ICD-10-CM

## 2024-01-11 DIAGNOSIS — M54.2 NECK PAIN: ICD-10-CM

## 2024-01-11 RX ORDER — SEMAGLUTIDE 2.68 MG/ML
2 INJECTION, SOLUTION SUBCUTANEOUS WEEKLY
Qty: 9 ML | Refills: 3 | Status: SHIPPED | OUTPATIENT
Start: 2024-01-11

## 2024-01-11 RX ORDER — CHLORTHALIDONE 50 MG/1
25 TABLET ORAL DAILY
Start: 2024-01-11

## 2024-01-11 RX ORDER — INSULIN ASPART 100 [IU]/ML
INJECTION, SOLUTION INTRAVENOUS; SUBCUTANEOUS
Qty: 15 ML | Refills: 11 | Status: SHIPPED | OUTPATIENT
Start: 2024-01-11

## 2024-01-11 RX ORDER — INSULIN DEGLUDEC 200 U/ML
INJECTION, SOLUTION SUBCUTANEOUS
Qty: 30 ML | Refills: 11 | Status: SHIPPED | OUTPATIENT
Start: 2024-01-11

## 2024-01-11 RX ORDER — METHOCARBAMOL 500 MG/1
500 TABLET, FILM COATED ORAL 3 TIMES DAILY PRN
Qty: 60 TABLET | Refills: 1 | Status: SHIPPED | OUTPATIENT
Start: 2024-01-11

## 2024-01-11 NOTE — ASSESSMENT & PLAN NOTE
-Diabetes remains uncontrolled due to hyperglycemia and hypoglycemia  -Complications include known diabetic retinopathy, diabetic polyneuropathy, CAD with heart failure and CKD 3b  -Patient remains high risk for complications due to persistent hyperglycemia; counseled that long term hyperglycemia can cause worsening neuropathy, kidney damage, eye damage, and cardiovascular disease  -Counseled on the importance of giving consistent insulin dosing, currently he is getting too high of insulin dose when he does give his shots and skipping doses due to low blood glucoses  -Goal glucose would be 120-160 mg/dl   -Continue Dexcom G7 CGM  -Recommend consistently taking Tresiba U200 20 units twice daily; he prefers to take it twice daily as he thinks it works better; if he has fasting hypoglycemia recommend reduction in dose  -Recommend consistently taking NovoLog 8 units with meals if blood glucose is less than 212 units with meals of blood glucoses above 200  -Continue Ozempic 2 mg weekly  -Continue Jardiance 10 mg daily, unclear how much glycemic impact this will have with his advanced kidney disease  -intolerant to ACEi/ARB due to CKD with hyperkalemia; blood pressure today 130/72     DM Health Maintenance:  Ophtho: 5/2/2023 showing primary open-angle glaucoma, mild nonproliferative diabetic retinopathy, age-related cataract of the right eye and posterior vitreous detachment  Monofilament / Foot exam: Completed 3/1/2023  Lipids/Statin: taking rosuvastatin 20mg daily; LDL is not at goal, managed by PCP  GABRIEL: not indicated as he is being followed for CKD IIIb  TSH: 1.28 done 12/10/2022  Aspirin: not taking as he takes plavix

## 2024-01-11 NOTE — PROGRESS NOTES
"Chief Complaint  Neck Pain    Subjective          Elzbieta Nunez presents to [facility]  History of Present Illness    Patient presents the office today for follow-up on neck pain and feelings of fatigue and weakness.    Patient states that he continues to have some neck pain, but this waxes and wanes.  It is worse when he first wakes up in the morning.  He states that it feels tight and feels like he is unable to turn his head fully to the left side.    He also states that he has been having more issues with fatigue and weakness.  He states that he has been seeing endocrinology and is doing well with his sugars and has had about a 25 pound weight loss.  He states that none of his other medications have been adjusted.  His blood pressure continues to be in the 100s to 110s over 50s to 60s.  He states that he does get dizzy whenever he goes to stand up and start moving too quickly.    Objective   Vital Signs:   /58   Pulse 65   Ht 180.3 cm (71\")   Wt 104 kg (230 lb)   SpO2 95%   BMI 32.08 kg/m²     Body mass index is 32.08 kg/m².    Review of Systems    Past History:  Medical History: has a past medical history of Acute otitis externa, Acute otitis media, Atypical chest pain, Cellulitis, Diabetes mellitus type 1, Dysuria, Esophagitis, Gastritis, Gastroenteritis, Glucosuria, Hyperlipidemia, Hypertension, Hypothyroidism, Insulin dependent diabetes mellitus type IA, Nicotine dependence, Obesity, Penetrating foreign body of skin of index finger, initial encounter, Peripheral vascular disease, and Sinusitis.   Surgical History: has a past surgical history that includes Leg Biopsy (02/01/2009).   Family History: family history includes Developmental delay (age of onset: 74) in his mother; Diabetes (age of onset: 90) in his father; Hypertension (age of onset: 90) in his father.   Social History: reports that he quit smoking about 2 years ago. His smoking use included cigarettes. He " started smoking about 48 years ago. He has a 46.00 pack-year smoking history. He has been exposed to tobacco smoke. He has never used smokeless tobacco. He reports that he does not currently use alcohol. Drug use questions deferred to the physician.      Current Outpatient Medications:     chlorthalidone (HYGROTEN) 50 MG tablet, Take 0.5 tablets by mouth Daily., Disp: , Rfl:     allopurinol (ZYLOPRIM) 100 MG tablet, Take 1 tablet by mouth Daily., Disp: 90 tablet, Rfl: 1    amLODIPine (NORVASC) 10 MG tablet, TAKE ONE TABLET BY MOUTH DAILY, Disp: 90 tablet, Rfl: 1    bumetanide (BUMEX) 2 MG tablet, Take 1 tablet by mouth Daily., Disp: 90 tablet, Rfl: 1    carvedilol (COREG) 25 MG tablet, Take 1 tablet by mouth 2 (Two) Times a Day With Meals., Disp: 180 tablet, Rfl: 1    citalopram (CeleXA) 20 MG tablet, Take 1 tablet by mouth Daily., Disp: 90 tablet, Rfl: 1    clopidogrel (PLAVIX) 75 MG tablet, TAKE ONE TABLET BY MOUTH DAILY, Disp: 90 tablet, Rfl: 1    Continuous Blood Gluc Sensor (Dexcom G7 Sensor) misc, 1 Device Every 10 (Ten) Days., Disp: 9 each, Rfl: 3    dorzolamide-timolol (COSOPT) 22.3-6.8 MG/ML ophthalmic solution, , Disp: , Rfl:     empagliflozin (JARDIANCE) 10 MG tablet tablet, Take 1 tablet by mouth Daily., Disp: 30 tablet, Rfl: 11    ferrous sulfate 324 (65 Fe) MG tablet delayed-release EC tablet, TAKE ONE TABLET BY MOUTH DAILY WITH BREAKFAST, Disp: 90 tablet, Rfl: 1    gemfibrozil (LOPID) 600 MG tablet, TAKE ONE TABLET BY MOUTH TWICE A DAY, Disp: 180 tablet, Rfl: 1    hydrALAZINE (APRESOLINE) 100 MG tablet, Take 1 tablet by mouth 3 (Three) Times a Day., Disp: 270 tablet, Rfl: 1    insulin aspart (NovoLOG FlexPen) 100 UNIT/ML solution pen-injector sc pen, Inject 8u with meals if BG < 200mg/dl and 12u with meals in BG>200; max daily dose 50u, Disp: 15 mL, Rfl: 11    Insulin Degludec (Tresiba FlexTouch) 200 UNIT/ML solution pen-injector pen injection, Inject 20u twice daily, titrate for max daily dose 50u,  Disp: 30 mL, Rfl: 11    Insulin Pen Needle (B-D ULTRAFINE III SHORT PEN) 31G X 8 MM misc, Use as directed up to 6 times per day, Disp: 200 each, Rfl: 11    isosorbide mononitrate (IMDUR) 120 MG 24 hr tablet, , Disp: , Rfl:     latanoprost (XALATAN) 0.005 % ophthalmic solution, , Disp: , Rfl:     methocarbamol (ROBAXIN) 500 MG tablet, Take 1 tablet by mouth 3 (Three) Times a Day As Needed for Muscle Spasms., Disp: 60 tablet, Rfl: 1    pantoprazole (PROTONIX) 40 MG EC tablet, Take 1 tablet by mouth 2 (Two) Times a Day., Disp: 180 tablet, Rfl: 1    rosuvastatin (CRESTOR) 20 MG tablet, TAKE ONE TABLET BY MOUTH DAILY, Disp: 90 tablet, Rfl: 1    Semaglutide, 2 MG/DOSE, (Ozempic, 2 MG/DOSE,) 8 MG/3ML solution pen-injector, Inject 2 mg under the skin into the appropriate area as directed 1 (One) Time Per Week., Disp: 9 mL, Rfl: 3    Allergies: Moxifloxacin, Niacin, Oxycodone hcl, and Oxycodone-acetaminophen    Physical Exam  Constitutional:       General: He is not in acute distress.     Appearance: He is not ill-appearing or toxic-appearing.   HENT:      Head: Normocephalic and atraumatic.   Cardiovascular:      Rate and Rhythm: Normal rate and regular rhythm.      Heart sounds: No murmur heard.  Pulmonary:      Effort: Pulmonary effort is normal. No respiratory distress.   Neurological:      General: No focal deficit present.      Mental Status: He is alert and oriented to person, place, and time.   Psychiatric:         Mood and Affect: Mood normal.         Thought Content: Thought content normal.          Result Review :                   Assessment and Plan    Diagnoses and all orders for this visit:    1. Primary hypertension (Primary)    2. CKD stage 3 due to type 2 diabetes mellitus    3. Neck pain    Other orders  -     chlorthalidone (HYGROTEN) 50 MG tablet; Take 0.5 tablets by mouth Daily.  -     methocarbamol (ROBAXIN) 500 MG tablet; Take 1 tablet by mouth 3 (Three) Times a Day As Needed for Muscle Spasms.   Dispense: 60 tablet; Refill: 1    Discussed with patient that he likely has muscle spasm secondary to how he is sleeping at night.  Will do low-dose Robaxin.  Advised to take half to 1 tablet 3 times daily as needed especially at bedtime to help with muscle spasms.    Will decrease chlorthalidone from 50 mg to 25 mg.  Patient to take half tablet daily rather than a full tablet.  Follow-up in 2 to 3 weeks for blood pressure recheck.  Advised him to continue to monitor his blood pressure at home and contact the office if he continues to have significant orthostasis and low blood pressures.    Follow Up   No follow-ups on file.  Patient was given instructions and counseling regarding his condition or for health maintenance advice. Please see specific information pulled into the AVS if appropriate.     Divina Delgado, DO

## 2024-01-11 NOTE — OUTREACH NOTE
AMBULATORY CASE MANAGEMENT NOTE    Name and Relationship of Patient/Support Person: Elzbieta Nunez    CCM Interim Update    Spoke to patient and wife. Patient reports increased neck pain. Wife states pain has been present for a while, but the frequency and intensity has increased. She states he is in significant pain and has appointment with PCP today to discuss pain management. Patient utilizes OTC medications and heat therapy to help with pain. Advised patient to seek emergency care if pain increases. Also advised patient to limit movement and avoid strenuous activity until assessed by provider. Wife agreed. Reported concerns to PCP.     Discussed BP and blood sugar management. Wife states BP stays within normal range. And blood sugar is below 200. Patient has follow-up appointment with endocrinology today and recently had follow-up with cardiology. Advised patient to continue to write down values and report to ACM if bp is greater than 160 systolic or less than 90 systolic. Commended patient for keeping specialty appointments. No further needs at this time.       Adult Patient Profile  Questions/Answers      Flowsheet Row Most Recent Value   Symptoms/Conditions Managed at Home musculoskeletal   Barriers to Managing Health understanding health advice, stress of chronic illness   Musculoskeletal Symptoms/Conditions joint pain, other (see comments)  [shoulder and neck pain]            Education Documentation  Unresolved/Worsening Symptoms, taught by Lily Hermosillo RN at 1/11/2024 10:01 AM.  Learner: Patient  Readiness: Acceptance  Method: Explanation  Response: Verbalizes Understanding  Comment: Discussed neck pain symptoms and when to seek emergency care.    Signs of Medication Tolerance, taught by Lily Hermosillo RN at 1/11/2024 10:01 AM.  Learner: Patient  Readiness: Acceptance  Method: Explanation  Response: Verbalizes Understanding  Comment: Discussed neck pain symptoms and when to seek emergency  care.    Sleep/Rest, taught by Lily Hermosillo RN at 1/11/2024 10:01 AM.  Learner: Patient  Readiness: Acceptance  Method: Explanation  Response: Verbalizes Understanding  Comment: Discussed neck pain symptoms and when to seek emergency care.    Pain Self-Advocacy, taught by Lily Hermosillo RN at 1/11/2024 10:01 AM.  Learner: Patient  Readiness: Acceptance  Method: Explanation  Response: Verbalizes Understanding  Comment: Discussed neck pain symptoms and when to seek emergency care.    Nonpharmacologic Pain Management, taught by Lily Hermosillo RN at 1/11/2024 10:01 AM.  Learner: Patient  Readiness: Acceptance  Method: Explanation  Response: Verbalizes Understanding  Comment: Discussed neck pain symptoms and when to seek emergency care.    Medication Management, taught by Lily Hermosillo RN at 1/11/2024 10:01 AM.  Learner: Patient  Readiness: Acceptance  Method: Explanation  Response: Verbalizes Understanding  Comment: Discussed neck pain symptoms and when to seek emergency care.    Coping Strategies, taught by Lily Hermosillo RN at 1/11/2024 10:01 AM.  Learner: Patient  Readiness: Acceptance  Method: Explanation  Response: Verbalizes Understanding  Comment: Discussed neck pain symptoms and when to seek emergency care.    Activity, taught by Lily Hermosillo RN at 1/11/2024 10:01 AM.  Learner: Patient  Readiness: Acceptance  Method: Explanation  Response: Verbalizes Understanding  Comment: Discussed neck pain symptoms and when to seek emergency care.    Signs/Symptoms, taught by Lily Hermosillo RN at 1/11/2024 10:01 AM.  Learner: Patient  Readiness: Acceptance  Method: Explanation  Response: Verbalizes Understanding  Comment: Discussed neck pain symptoms and when to seek emergency care.    Risk Factors, taught by Lily Hermosillo RN at 1/11/2024 10:01 AM.  Learner: Patient  Readiness: Acceptance  Method: Explanation  Response: Verbalizes Understanding  Comment: Discussed neck pain symptoms and when to seek  emergency care.          Lily WADSWORTH  Ambulatory Case Management    1/11/2024, 10:02 EST

## 2024-01-15 ENCOUNTER — PATIENT OUTREACH (OUTPATIENT)
Dept: CASE MANAGEMENT | Facility: OTHER | Age: 79
End: 2024-01-15
Payer: MEDICARE

## 2024-01-15 DIAGNOSIS — E11.22 TYPE 2 DIABETES MELLITUS WITH STAGE 4 CHRONIC KIDNEY DISEASE, WITH LONG-TERM CURRENT USE OF INSULIN: Primary | ICD-10-CM

## 2024-01-15 DIAGNOSIS — I10 ESSENTIAL HYPERTENSION: ICD-10-CM

## 2024-01-15 DIAGNOSIS — N18.4 TYPE 2 DIABETES MELLITUS WITH STAGE 4 CHRONIC KIDNEY DISEASE, WITH LONG-TERM CURRENT USE OF INSULIN: Primary | ICD-10-CM

## 2024-01-15 DIAGNOSIS — Z79.4 TYPE 2 DIABETES MELLITUS WITH STAGE 4 CHRONIC KIDNEY DISEASE, WITH LONG-TERM CURRENT USE OF INSULIN: Primary | ICD-10-CM

## 2024-01-15 NOTE — OUTREACH NOTE
AMBULATORY CASE MANAGEMENT NOTE    Name and Relationship of Patient/Support Person:  -     [facility] Interim Update    PCP requested ACM contact patient to retrieve bp values as bp was lower than patient's baseline at recent office visit. Patient reports he has not checked bp since visit and has not started taking medication prescribed at appointment. Educated patient about the importance of starting and adhering to medication. Requested patient check bp. He states he does not feel like doing it right now. Advised patient to check bp daily; will contact patient in a few days to retrieve values. Also reminded patient about symptoms of hypotension/hypertension. Instructed patient to check bp if symptoms occur. Patient agreed. No needs at this time.      Education Documentation  No documentation found.        Lily WADSWORTH  Ambulatory Case Management    1/15/2024, 11:03 EST

## 2024-01-19 ENCOUNTER — PATIENT OUTREACH (OUTPATIENT)
Dept: CASE MANAGEMENT | Facility: OTHER | Age: 79
End: 2024-01-19
Payer: MEDICARE

## 2024-01-19 DIAGNOSIS — I10 ESSENTIAL HYPERTENSION: ICD-10-CM

## 2024-01-19 DIAGNOSIS — Z79.4 TYPE 2 DIABETES MELLITUS WITH STAGE 4 CHRONIC KIDNEY DISEASE, WITH LONG-TERM CURRENT USE OF INSULIN: Primary | ICD-10-CM

## 2024-01-19 DIAGNOSIS — N18.4 TYPE 2 DIABETES MELLITUS WITH STAGE 4 CHRONIC KIDNEY DISEASE, WITH LONG-TERM CURRENT USE OF INSULIN: Primary | ICD-10-CM

## 2024-01-19 DIAGNOSIS — E11.22 TYPE 2 DIABETES MELLITUS WITH STAGE 4 CHRONIC KIDNEY DISEASE, WITH LONG-TERM CURRENT USE OF INSULIN: Primary | ICD-10-CM

## 2024-01-19 NOTE — OUTREACH NOTE
AMBULATORY CASE MANAGEMENT NOTE    Name and Relationship of Patient/Support Person: Jessica Nunez - Spouse    CCM Interim Update    Spoke to patient's wife about patient's bp as requested by PCP. Wife states patient's blood pressure values have been much better. His recent bp was 120/75. The day prior his bp was 104/70. Patient has not experienced any lightheadedness. Notified PCP.     Wife states patient's neck pain has also improved since taking methocarbamol. Wife reports patient hasn't complained of pain in several days.         Education Documentation  No documentation found.        Lily WADSWORTH  Ambulatory Case Management    1/19/2024, 11:35 EST

## 2024-01-30 ENCOUNTER — PATIENT OUTREACH (OUTPATIENT)
Dept: CASE MANAGEMENT | Facility: OTHER | Age: 79
End: 2024-01-30
Payer: MEDICARE

## 2024-01-30 DIAGNOSIS — E11.22 TYPE 2 DIABETES MELLITUS WITH STAGE 4 CHRONIC KIDNEY DISEASE, WITH LONG-TERM CURRENT USE OF INSULIN: Primary | ICD-10-CM

## 2024-01-30 DIAGNOSIS — I10 ESSENTIAL HYPERTENSION: ICD-10-CM

## 2024-01-30 DIAGNOSIS — Z79.4 TYPE 2 DIABETES MELLITUS WITH STAGE 4 CHRONIC KIDNEY DISEASE, WITH LONG-TERM CURRENT USE OF INSULIN: Primary | ICD-10-CM

## 2024-01-30 DIAGNOSIS — N18.4 TYPE 2 DIABETES MELLITUS WITH STAGE 4 CHRONIC KIDNEY DISEASE, WITH LONG-TERM CURRENT USE OF INSULIN: Primary | ICD-10-CM

## 2024-01-30 NOTE — OUTREACH NOTE
CCM End of Month Documentation    This Chronic Medical Management Care Plan for Elzbieta Nunez, 78 y.o. male, has been monitored and managed; reviewed and a new plan of care implemented for the month of January.  A cumulative time of 36  minutes was spent on this patient record this month, including chart review; phone call with patient; face to face with provider.    Regarding the patient's problems: has Essential hypertension; Mixed hyperlipidemia; PVD (peripheral vascular disease); and Type 2 diabetes mellitus with stage 3b chronic kidney disease, with long-term current use of insulin on their problem list., the following items were addressed: medical records and any changes can be found within the plan section of the note.  A detailed listing of time spent for chronic care management is tracked within each outreach encounter.  Current medications include:  has a current medication list which includes the following prescription(s): allopurinol, amlodipine, bumetanide, carvedilol, chlorthalidone, citalopram, clopidogrel, dexcom g7 sensor, dorzolamide-timolol, empagliflozin, ferrous sulfate, gemfibrozil, hydralazine, novolog flexpen, tresiba flextouch, b-d ultrafine iii short pen, isosorbide mononitrate, latanoprost, methocarbamol, pantoprazole, rosuvastatin, and ozempic (2 mg/dose). and the patient is reported to be patient is compliant with medication protocol,  Medications are reported to be non-effective in controlling symptoms and changes have been made to the medication protocol.All notes on chart for PCP to review.    The patient was monitored remotely for pain.    The patient's physical needs include:  physical healthcare.     The patient's mental support needs include:  not applicable    The patient's cognitive support needs include:  health care    The patient's psychosocial support needs include:  continued support    The patient's functional needs include: not applicable    The patient's environmental  needs include:  not applicable    Care Plan overall comments:  No data recorded    Refer to previous outreach notes for more information on the areas listed above.    Monthly Billing Diagnoses  (E11.22,  N18.4,  Z79.4) Type 2 diabetes mellitus with stage 4 chronic kidney disease, with long-term current use of insulin    (I10) Essential hypertension    Medications   Medications have been reconciled    Care Plan progress this month:      Recently Modified Care Plans Updates made since 12/30/2023 12:00 AM      No recently modified care plans.                Instructions   Patient was provided an electronic copy of care plan  CCM services were explained and offered and patient has accepted these services.  Patient has given their written consent to receive CCM services and understands that this includes the authorization of electronic communication of medical information with the other treating providers.  Patient understands that they may stop CCM services at any time and these changes will be effective at the end of the calendar month and will effectively revocate the agreement of CCM services.  Patient understands that only one practitioner can furnish and be paid for CCM services during one calendar month.  Patient also understands that there may be co-payment or deductible fees in association with CCM services.  Patient will continue with at least monthly follow-up calls with the Ambulatory .    Lily WADSWORTH  Ambulatory Case Management    1/30/2024, 09:04 EST

## 2024-02-05 ENCOUNTER — LAB (OUTPATIENT)
Dept: LAB | Facility: HOSPITAL | Age: 79
End: 2024-02-05
Payer: MEDICARE

## 2024-02-05 ENCOUNTER — TRANSCRIBE ORDERS (OUTPATIENT)
Dept: LAB | Facility: HOSPITAL | Age: 79
End: 2024-02-05
Payer: MEDICARE

## 2024-02-05 DIAGNOSIS — N18.31 CHRONIC KIDNEY DISEASE (CKD) STAGE G3A/A1, MODERATELY DECREASED GLOMERULAR FILTRATION RATE (GFR) BETWEEN 45-59 ML/MIN/1.73 SQUARE METER AND ALBUMINURIA CREATININE RATIO LESS THAN 30 MG/G (CMS/H*: Primary | ICD-10-CM

## 2024-02-05 DIAGNOSIS — N18.31 CHRONIC KIDNEY DISEASE (CKD) STAGE G3A/A1, MODERATELY DECREASED GLOMERULAR FILTRATION RATE (GFR) BETWEEN 45-59 ML/MIN/1.73 SQUARE METER AND ALBUMINURIA CREATININE RATIO LESS THAN 30 MG/G (CMS/H*: ICD-10-CM

## 2024-02-05 LAB
ALBUMIN SERPL-MCNC: 4.1 G/DL (ref 3.5–5.2)
ANION GAP SERPL CALCULATED.3IONS-SCNC: 15.7 MMOL/L (ref 5–15)
BUN SERPL-MCNC: 40 MG/DL (ref 8–23)
BUN/CREAT SERPL: 18.5 (ref 7–25)
CALCIUM SPEC-SCNC: 9.6 MG/DL (ref 8.6–10.5)
CHLORIDE SERPL-SCNC: 102 MMOL/L (ref 98–107)
CO2 SERPL-SCNC: 23.3 MMOL/L (ref 22–29)
CREAT SERPL-MCNC: 2.16 MG/DL (ref 0.76–1.27)
DEPRECATED RDW RBC AUTO: 56 FL (ref 37–54)
EGFRCR SERPLBLD CKD-EPI 2021: 30.8 ML/MIN/1.73
ERYTHROCYTE [DISTWIDTH] IN BLOOD BY AUTOMATED COUNT: 15 % (ref 12.3–15.4)
GLUCOSE SERPL-MCNC: 184 MG/DL (ref 65–99)
HCT VFR BLD AUTO: 31.5 % (ref 37.5–51)
HGB BLD-MCNC: 10.5 G/DL (ref 13–17.7)
MCH RBC QN AUTO: 34.1 PG (ref 26.6–33)
MCHC RBC AUTO-ENTMCNC: 33.3 G/DL (ref 31.5–35.7)
MCV RBC AUTO: 102.3 FL (ref 79–97)
PHOSPHATE SERPL-MCNC: 3.7 MG/DL (ref 2.5–4.5)
PLATELET # BLD AUTO: 323 10*3/MM3 (ref 140–450)
PMV BLD AUTO: 10.4 FL (ref 6–12)
POTASSIUM SERPL-SCNC: 4.1 MMOL/L (ref 3.5–5.2)
RBC # BLD AUTO: 3.08 10*6/MM3 (ref 4.14–5.8)
SODIUM SERPL-SCNC: 141 MMOL/L (ref 136–145)
WBC NRBC COR # BLD AUTO: 8.78 10*3/MM3 (ref 3.4–10.8)

## 2024-02-05 PROCEDURE — 85027 COMPLETE CBC AUTOMATED: CPT

## 2024-02-05 PROCEDURE — 36415 COLL VENOUS BLD VENIPUNCTURE: CPT

## 2024-02-05 PROCEDURE — 80069 RENAL FUNCTION PANEL: CPT

## 2024-02-15 DIAGNOSIS — I10 PRIMARY HYPERTENSION: ICD-10-CM

## 2024-02-15 RX ORDER — AMLODIPINE BESYLATE 10 MG/1
10 TABLET ORAL DAILY
Qty: 90 TABLET | Refills: 1 | Status: SHIPPED | OUTPATIENT
Start: 2024-02-15

## 2024-02-27 ENCOUNTER — PATIENT OUTREACH (OUTPATIENT)
Dept: CASE MANAGEMENT | Facility: OTHER | Age: 79
End: 2024-02-27
Payer: MEDICARE

## 2024-02-27 DIAGNOSIS — Z79.4 TYPE 2 DIABETES MELLITUS WITH STAGE 4 CHRONIC KIDNEY DISEASE, WITH LONG-TERM CURRENT USE OF INSULIN: Primary | ICD-10-CM

## 2024-02-27 DIAGNOSIS — E11.22 TYPE 2 DIABETES MELLITUS WITH STAGE 4 CHRONIC KIDNEY DISEASE, WITH LONG-TERM CURRENT USE OF INSULIN: Primary | ICD-10-CM

## 2024-02-27 DIAGNOSIS — I10 ESSENTIAL HYPERTENSION: ICD-10-CM

## 2024-02-27 DIAGNOSIS — N18.4 TYPE 2 DIABETES MELLITUS WITH STAGE 4 CHRONIC KIDNEY DISEASE, WITH LONG-TERM CURRENT USE OF INSULIN: Primary | ICD-10-CM

## 2024-02-27 NOTE — OUTREACH NOTE
AMBULATORY CASE MANAGEMENT NOTE    Name and Relationship of Patient/Support Person: Enrique Elzbieta - Self    CCM Interim Update    Patient states he continues to have neck pain. Methocarbamol has improved pain and neck mobility some. He is now experiencing numbness in fingers and would like to see PCP regarding symptoms. Scheduled PCP appointment.    Patient states bp has improved tremendously. He checked blood pressure while on phone. He states it is 125/79. He states bp is generally ranging from 101 to 130 over 70's. He also reports blood sugar remains under 200. No further needs at this time.         Adult Patient Profile  Questions/Answers      Flowsheet Row Most Recent Value   Symptoms/Conditions Managed at Home musculoskeletal, peripheral/neurovascular   Musculoskeletal Symptoms/Conditions other (see comments)  [neck pain]   Peripheral Neurovascular Symptoms/Conditions other (see comments)  [numbness in fingers]            Education Documentation  No documentation found.        Lily WADSWORTH  Ambulatory Case Management    2/27/2024, 16:37 EST

## 2024-02-29 ENCOUNTER — OFFICE VISIT (OUTPATIENT)
Dept: FAMILY MEDICINE CLINIC | Facility: CLINIC | Age: 79
End: 2024-02-29
Payer: MEDICARE

## 2024-02-29 VITALS
DIASTOLIC BLOOD PRESSURE: 68 MMHG | WEIGHT: 233.25 LBS | BODY MASS INDEX: 32.65 KG/M2 | SYSTOLIC BLOOD PRESSURE: 140 MMHG | HEART RATE: 60 BPM | RESPIRATION RATE: 17 BRPM | OXYGEN SATURATION: 97 % | HEIGHT: 71 IN

## 2024-02-29 DIAGNOSIS — M79.644 THUMB PAIN, RIGHT: Primary | ICD-10-CM

## 2024-02-29 PROCEDURE — 99213 OFFICE O/P EST LOW 20 MIN: CPT | Performed by: STUDENT IN AN ORGANIZED HEALTH CARE EDUCATION/TRAINING PROGRAM

## 2024-02-29 PROCEDURE — 3078F DIAST BP <80 MM HG: CPT | Performed by: STUDENT IN AN ORGANIZED HEALTH CARE EDUCATION/TRAINING PROGRAM

## 2024-02-29 PROCEDURE — 3077F SYST BP >= 140 MM HG: CPT | Performed by: STUDENT IN AN ORGANIZED HEALTH CARE EDUCATION/TRAINING PROGRAM

## 2024-02-29 NOTE — PROGRESS NOTES
"Chief Complaint  Numbness (Fingers on the right hand are having some numbness. )    Stefan Nunez presents to [facility]  History of Present Illness    Patient states that he has been having some trouble with his hand. He states that he has been noticing this more over the psat few weeks. It is at its worst when he is holding his phone or tablet, or when he is driving. He states that he has not had as much neck pain recently. He denies any facial weakness/droop, weakness of the extremity.     Objective   Vital Signs:   /68 (BP Location: Left arm, Patient Position: Sitting, Cuff Size: Adult)   Pulse 60   Resp 17   Ht 180.3 cm (71\")   Wt 106 kg (233 lb 4 oz)   SpO2 97%   BMI 32.53 kg/m²     Body mass index is 32.53 kg/m².    Review of Systems    Past History:  Medical History: has a past medical history of Acute otitis externa, Acute otitis media, Atypical chest pain, Cellulitis, Diabetes mellitus type 1, Dysuria, Esophagitis, Gastritis, Gastroenteritis, Glucosuria, Hyperlipidemia, Hypertension, Hypothyroidism, Insulin dependent diabetes mellitus type IA, Nicotine dependence, Obesity, Penetrating foreign body of skin of index finger, initial encounter, Peripheral vascular disease, and Sinusitis.   Surgical History: has a past surgical history that includes Leg Biopsy (02/01/2009).   Family History: family history includes Developmental delay (age of onset: 74) in his mother; Diabetes (age of onset: 90) in his father; Hypertension (age of onset: 90) in his father.   Social History: reports that he quit smoking about 2 years ago. His smoking use included cigarettes. He started smoking about 48 years ago. He has a 46.00 pack-year smoking history. He has been exposed to tobacco smoke. He has never used smokeless tobacco. He reports that he does not currently use alcohol. Drug use questions deferred to the physician.      Current Outpatient Medications:     " allopurinol (ZYLOPRIM) 100 MG tablet, Take 1 tablet by mouth Daily., Disp: 90 tablet, Rfl: 1    amLODIPine (NORVASC) 10 MG tablet, TAKE 1 TABLET BY MOUTH DAILY, Disp: 90 tablet, Rfl: 1    bumetanide (BUMEX) 2 MG tablet, Take 1 tablet by mouth Daily., Disp: 90 tablet, Rfl: 1    carvedilol (COREG) 25 MG tablet, Take 1 tablet by mouth 2 (Two) Times a Day With Meals., Disp: 180 tablet, Rfl: 1    chlorthalidone (HYGROTEN) 50 MG tablet, Take 0.5 tablets by mouth Daily., Disp: , Rfl:     citalopram (CeleXA) 20 MG tablet, Take 1 tablet by mouth Daily., Disp: 90 tablet, Rfl: 1    clopidogrel (PLAVIX) 75 MG tablet, TAKE ONE TABLET BY MOUTH DAILY, Disp: 90 tablet, Rfl: 1    Continuous Blood Gluc Sensor (Dexcom G7 Sensor) misc, 1 Device Every 10 (Ten) Days., Disp: 9 each, Rfl: 3    dorzolamide-timolol (COSOPT) 22.3-6.8 MG/ML ophthalmic solution, , Disp: , Rfl:     empagliflozin (JARDIANCE) 10 MG tablet tablet, Take 1 tablet by mouth Daily., Disp: 30 tablet, Rfl: 11    ferrous sulfate 324 (65 Fe) MG tablet delayed-release EC tablet, TAKE ONE TABLET BY MOUTH DAILY WITH BREAKFAST, Disp: 90 tablet, Rfl: 1    gemfibrozil (LOPID) 600 MG tablet, TAKE ONE TABLET BY MOUTH TWICE A DAY, Disp: 180 tablet, Rfl: 1    hydrALAZINE (APRESOLINE) 100 MG tablet, Take 1 tablet by mouth 3 (Three) Times a Day., Disp: 270 tablet, Rfl: 1    insulin aspart (NovoLOG FlexPen) 100 UNIT/ML solution pen-injector sc pen, Inject 8u with meals if BG < 200mg/dl and 12u with meals in BG>200; max daily dose 50u, Disp: 15 mL, Rfl: 11    Insulin Degludec (Tresiba FlexTouch) 200 UNIT/ML solution pen-injector pen injection, Inject 20u twice daily, titrate for max daily dose 50u, Disp: 30 mL, Rfl: 11    Insulin Pen Needle (B-D ULTRAFINE III SHORT PEN) 31G X 8 MM misc, Use as directed up to 6 times per day, Disp: 200 each, Rfl: 11    isosorbide mononitrate (IMDUR) 120 MG 24 hr tablet, , Disp: , Rfl:     latanoprost (XALATAN) 0.005 % ophthalmic solution, , Disp: , Rfl:      pantoprazole (PROTONIX) 40 MG EC tablet, Take 1 tablet by mouth 2 (Two) Times a Day., Disp: 180 tablet, Rfl: 1    rosuvastatin (CRESTOR) 20 MG tablet, TAKE ONE TABLET BY MOUTH DAILY, Disp: 90 tablet, Rfl: 1    Semaglutide, 2 MG/DOSE, (Ozempic, 2 MG/DOSE,) 8 MG/3ML solution pen-injector, Inject 2 mg under the skin into the appropriate area as directed 1 (One) Time Per Week., Disp: 9 mL, Rfl: 3    Allergies: Moxifloxacin, Niacin, Oxycodone hcl, and Oxycodone-acetaminophen    Physical Exam  Constitutional:       General: He is not in acute distress.     Appearance: He is not ill-appearing or toxic-appearing.   HENT:      Head: Normocephalic and atraumatic.   Cardiovascular:      Rate and Rhythm: Normal rate and regular rhythm.      Heart sounds: No murmur heard.  Pulmonary:      Effort: Pulmonary effort is normal. No respiratory distress.   Musculoskeletal:      Comments: Negative Tinel, Prayer, and phalens tests. Decreased sensation with palpation on the pad and sides of the thumb, normal sensation on the back of the thumb   Neurological:      General: No focal deficit present.      Mental Status: He is alert and oriented to person, place, and time.   Psychiatric:         Mood and Affect: Mood normal.         Thought Content: Thought content normal.          Result Review :                   Assessment and Plan    Diagnoses and all orders for this visit:    1. Thumb pain, right (Primary)    Does not appear to be radiating pain from the neck, but could be the onset of carpal tunnel syndrome. Will do Voltaren gel BID for the next 2 weeks and if no improvement will send for nerve conduction study.     Voltaren samples given in the office today.       Follow Up   No follow-ups on file.  Patient was given instructions and counseling regarding his condition or for health maintenance advice. Please see specific information pulled into the AVS if appropriate.     Divina Delgado, DO

## 2024-03-04 ENCOUNTER — OFFICE VISIT (OUTPATIENT)
Dept: FAMILY MEDICINE CLINIC | Facility: CLINIC | Age: 79
End: 2024-03-04
Payer: MEDICARE

## 2024-03-04 VITALS
DIASTOLIC BLOOD PRESSURE: 90 MMHG | HEART RATE: 64 BPM | HEIGHT: 71 IN | SYSTOLIC BLOOD PRESSURE: 122 MMHG | OXYGEN SATURATION: 99 % | WEIGHT: 238 LBS | BODY MASS INDEX: 33.32 KG/M2

## 2024-03-04 DIAGNOSIS — H60.501 ACUTE OTITIS EXTERNA OF RIGHT EAR, UNSPECIFIED TYPE: Primary | ICD-10-CM

## 2024-03-04 PROCEDURE — 3080F DIAST BP >= 90 MM HG: CPT | Performed by: NURSE PRACTITIONER

## 2024-03-04 PROCEDURE — 1160F RVW MEDS BY RX/DR IN RCRD: CPT | Performed by: NURSE PRACTITIONER

## 2024-03-04 PROCEDURE — 1159F MED LIST DOCD IN RCRD: CPT | Performed by: NURSE PRACTITIONER

## 2024-03-04 PROCEDURE — 3074F SYST BP LT 130 MM HG: CPT | Performed by: NURSE PRACTITIONER

## 2024-03-04 PROCEDURE — 99214 OFFICE O/P EST MOD 30 MIN: CPT | Performed by: NURSE PRACTITIONER

## 2024-03-04 RX ORDER — CEFDINIR 300 MG/1
300 CAPSULE ORAL 2 TIMES DAILY
Qty: 20 CAPSULE | Refills: 0 | Status: SHIPPED | OUTPATIENT
Start: 2024-03-04

## 2024-03-04 NOTE — PROGRESS NOTES
"Chief Complaint  Earache (Right earache x 3 days )    Stefan Nunez presents to [facility]  History of Present Illness  Pt has had right ear pain/swelling for 2 days. He denies fever, chills, or myalgias. He denies cough or congestion.   Earache   Pertinent negatives include no coughing.       Objective   Vital Signs:   /90   Pulse 64   Ht 180.3 cm (71\")   Wt 108 kg (238 lb)   SpO2 99%   BMI 33.19 kg/m²     Body mass index is 33.19 kg/m².    Review of Systems   Constitutional:  Negative for chills and fever.   HENT:  Positive for ear pain. Negative for congestion.    Respiratory:  Negative for cough.           Current Outpatient Medications:     allopurinol (ZYLOPRIM) 100 MG tablet, Take 1 tablet by mouth Daily., Disp: 90 tablet, Rfl: 1    amLODIPine (NORVASC) 10 MG tablet, TAKE 1 TABLET BY MOUTH DAILY, Disp: 90 tablet, Rfl: 1    bumetanide (BUMEX) 2 MG tablet, Take 1 tablet by mouth Daily., Disp: 90 tablet, Rfl: 1    carvedilol (COREG) 25 MG tablet, Take 1 tablet by mouth 2 (Two) Times a Day With Meals., Disp: 180 tablet, Rfl: 1    cefdinir (OMNICEF) 300 MG capsule, Take 1 capsule by mouth 2 (Two) Times a Day., Disp: 20 capsule, Rfl: 0    chlorthalidone (HYGROTEN) 50 MG tablet, Take 0.5 tablets by mouth Daily., Disp: , Rfl:     citalopram (CeleXA) 20 MG tablet, Take 1 tablet by mouth Daily., Disp: 90 tablet, Rfl: 1    clopidogrel (PLAVIX) 75 MG tablet, TAKE ONE TABLET BY MOUTH DAILY, Disp: 90 tablet, Rfl: 1    Continuous Blood Gluc Sensor (Dexcom G7 Sensor) misc, 1 Device Every 10 (Ten) Days., Disp: 9 each, Rfl: 3    dorzolamide-timolol (COSOPT) 22.3-6.8 MG/ML ophthalmic solution, , Disp: , Rfl:     empagliflozin (JARDIANCE) 10 MG tablet tablet, Take 1 tablet by mouth Daily., Disp: 30 tablet, Rfl: 11    ferrous sulfate 324 (65 Fe) MG tablet delayed-release EC tablet, TAKE ONE TABLET BY MOUTH DAILY WITH BREAKFAST, Disp: 90 tablet, Rfl: 1    gemfibrozil " (LOPID) 600 MG tablet, TAKE ONE TABLET BY MOUTH TWICE A DAY, Disp: 180 tablet, Rfl: 1    hydrALAZINE (APRESOLINE) 100 MG tablet, Take 1 tablet by mouth 3 (Three) Times a Day., Disp: 270 tablet, Rfl: 1    insulin aspart (NovoLOG FlexPen) 100 UNIT/ML solution pen-injector sc pen, Inject 8u with meals if BG < 200mg/dl and 12u with meals in BG>200; max daily dose 50u, Disp: 15 mL, Rfl: 11    Insulin Degludec (Tresiba FlexTouch) 200 UNIT/ML solution pen-injector pen injection, Inject 20u twice daily, titrate for max daily dose 50u, Disp: 30 mL, Rfl: 11    Insulin Pen Needle (B-D ULTRAFINE III SHORT PEN) 31G X 8 MM misc, Use as directed up to 6 times per day, Disp: 200 each, Rfl: 11    isosorbide mononitrate (IMDUR) 120 MG 24 hr tablet, , Disp: , Rfl:     latanoprost (XALATAN) 0.005 % ophthalmic solution, , Disp: , Rfl:     neomycin-polymyxin-hydrocortisone (CORTISPORIN) 3.5-60161-8 otic solution, Administer 3 drops to the right ear 4 (Four) Times a Day., Disp: 10 mL, Rfl: 0    pantoprazole (PROTONIX) 40 MG EC tablet, Take 1 tablet by mouth 2 (Two) Times a Day., Disp: 180 tablet, Rfl: 1    rosuvastatin (CRESTOR) 20 MG tablet, TAKE ONE TABLET BY MOUTH DAILY, Disp: 90 tablet, Rfl: 1    Semaglutide, 2 MG/DOSE, (Ozempic, 2 MG/DOSE,) 8 MG/3ML solution pen-injector, Inject 2 mg under the skin into the appropriate area as directed 1 (One) Time Per Week., Disp: 9 mL, Rfl: 3      Allergies: Moxifloxacin, Niacin, Oxycodone hcl, and Oxycodone-acetaminophen    Physical Exam  Constitutional:       Appearance: Normal appearance.   HENT:      Head: Normocephalic.      Ears:      Comments: Right external canal erythema/edema, unable to visualize TM due to edema.   Eyes:      Conjunctiva/sclera: Conjunctivae normal.      Pupils: Pupils are equal, round, and reactive to light.   Cardiovascular:      Rate and Rhythm: Normal rate and regular rhythm.      Heart sounds: Normal heart sounds.   Pulmonary:      Effort: Pulmonary effort is normal.       Breath sounds: Normal breath sounds.   Abdominal:      Tenderness: There is no abdominal tenderness.   Musculoskeletal:         General: Normal range of motion.   Skin:     General: Skin is warm and dry.      Capillary Refill: Capillary refill takes less than 2 seconds.   Neurological:      General: No focal deficit present.      Mental Status: He is alert and oriented to person, place, and time.   Psychiatric:         Mood and Affect: Mood normal.         Behavior: Behavior normal.         Thought Content: Thought content normal.         Judgment: Judgment normal.          Result Review :                   Assessment and Plan    Diagnoses and all orders for this visit:    1. Acute otitis externa of right ear, unspecified type (Primary)  Comments:  Finish antibiotics and use drops. Tylenol PRN pain. RTC if not improving in 1 week.  Orders:  -     cefdinir (OMNICEF) 300 MG capsule; Take 1 capsule by mouth 2 (Two) Times a Day.  Dispense: 20 capsule; Refill: 0  -     neomycin-polymyxin-hydrocortisone (CORTISPORIN) 3.5-34778-2 otic solution; Administer 3 drops to the right ear 4 (Four) Times a Day.  Dispense: 10 mL; Refill: 0                Follow Up   Return in about 1 week (around 3/11/2024) for if not improving or sooner if symptoms worsen.  Patient was given instructions and counseling regarding his condition or for health maintenance advice. Please see specific information pulled into the AVS if appropriate.     DONATO Monique

## 2024-03-19 RX ORDER — CITALOPRAM 20 MG/1
20 TABLET ORAL DAILY
Qty: 90 TABLET | Refills: 1 | Status: SHIPPED | OUTPATIENT
Start: 2024-03-19

## 2024-04-15 ENCOUNTER — PATIENT OUTREACH (OUTPATIENT)
Dept: CASE MANAGEMENT | Facility: OTHER | Age: 79
End: 2024-04-15
Payer: MEDICARE

## 2024-04-15 DIAGNOSIS — E11.22 TYPE 2 DIABETES MELLITUS WITH STAGE 4 CHRONIC KIDNEY DISEASE, WITH LONG-TERM CURRENT USE OF INSULIN: Primary | ICD-10-CM

## 2024-04-15 DIAGNOSIS — I10 ESSENTIAL HYPERTENSION: ICD-10-CM

## 2024-04-15 DIAGNOSIS — Z79.4 TYPE 2 DIABETES MELLITUS WITH STAGE 4 CHRONIC KIDNEY DISEASE, WITH LONG-TERM CURRENT USE OF INSULIN: Primary | ICD-10-CM

## 2024-04-15 DIAGNOSIS — N18.4 TYPE 2 DIABETES MELLITUS WITH STAGE 4 CHRONIC KIDNEY DISEASE, WITH LONG-TERM CURRENT USE OF INSULIN: Primary | ICD-10-CM

## 2024-04-16 DIAGNOSIS — K21.9 GASTROESOPHAGEAL REFLUX DISEASE, UNSPECIFIED WHETHER ESOPHAGITIS PRESENT: ICD-10-CM

## 2024-04-16 RX ORDER — PANTOPRAZOLE SODIUM 40 MG/1
40 TABLET, DELAYED RELEASE ORAL 2 TIMES DAILY
Qty: 180 TABLET | Refills: 1 | Status: SHIPPED | OUTPATIENT
Start: 2024-04-16

## 2024-04-16 RX ORDER — CARVEDILOL 25 MG/1
25 TABLET ORAL 2 TIMES DAILY WITH MEALS
Qty: 180 TABLET | Refills: 1 | Status: SHIPPED | OUTPATIENT
Start: 2024-04-16

## 2024-04-16 RX ORDER — ALLOPURINOL 100 MG/1
100 TABLET ORAL DAILY
Qty: 90 TABLET | Refills: 1 | Status: SHIPPED | OUTPATIENT
Start: 2024-04-16

## 2024-04-24 NOTE — PROGRESS NOTES
Chief complaint/Reason for consult: T2DM    HPI: Mr. Nunez is a 79yoM with T2DM, hypertension, hyperlipidemia, PVD and GERD who comes for follow-up of T2DM. Since lat visit on 1/11/2024 he reports no significant changes in his health.  He did stop Ozempic due to concerns over the cost of medication.      # [id number], uncontrolled due to hyperglycemia with complications  # CKD IIIb   # CAD with heart failure   # Diabetic retinopathy, resolved   # Diabetic polyneuropathy  - Diagnosed: ~1995 years ago   - Current regimen includes: Tresiba (U-200) 24u in the morning and 18-20u at night, Novolog 20u in the morning and 20u at night and Jardiance 10mg daily     - Stopped Ozempic due to cost   - Had some GI issues with Ozempic when he was taking it, not interested in resuming it  - Compliance with medication is poor    - HbA1c: 7.1% today (unreliable due to anemia); in office blood glucose 179 mg/dL  - Using Dexcom G7; download unavailable as he forgot his account log and information    14 day average 185mg/dl   12% very high  40% high  47 TIR  <1% low   <1% very low     - Takes prednisone for gout occasionally, none recently  - Hypoglycemia occurs rarely, typically if he takes too much insulin   - Patient has symptoms with lows   - Hyperglycemia occurs with missed bolus insulin and poor diet choices   - Patient reports neuropathy that is stable in hands and feet   - Patient denies gastroparesis   - Patient reports rotating injection sites, uses abdomen  - Patient with known ASCVD   - intolerant to ACEi/ARB due to CKD with hyperkalemia; blood pressure today 118/60     DM Health Maintenance:  Ophtho: 4/4/2024 showing primary open-angle glaucoma, no diabetic retinopathy seen, age-related cataract of the right eye and posterior vitreous detachment  Monofilament / Foot exam: Completed 3/1/2023  Lipids/Statin: taking rosuvastatin 20mg daily, managed by PCP  GABRIEL: not indicated as he is being followed for CKD IIIb by  "nephrology  TSH: 2.200 done 10/20/2023   Aspirin: not taking as he takes plavix     Past medical history, past surgical history, family history and social history reviewed within this encounter.     Review of Systems   Constitutional:  Negative for activity change and unexpected weight change.   HENT:  Positive for hearing loss. Negative for trouble swallowing and voice change.    Eyes:  Negative for visual disturbance.   Respiratory:  Negative for shortness of breath.    Cardiovascular:  Negative for chest pain.   Gastrointestinal:  Negative for abdominal pain.   Endocrine: Negative for cold intolerance and heat intolerance.   Genitourinary:  Negative for dysuria.   Musculoskeletal:  Positive for arthralgias.   Skin:  Negative for rash.   Neurological:  Positive for numbness.   Psychiatric/Behavioral:  Negative for agitation.         /60   Pulse 78   Ht 180.3 cm (71\")   Wt 108 kg (239 lb)   SpO2 98%   BMI 33.33 kg/m²      Physical Exam  Vitals reviewed.   Constitutional:       General: He is not in acute distress.     Appearance: He is obese.   HENT:      Head: Normocephalic.      Nose: Nose normal.   Eyes:      Conjunctiva/sclera: Conjunctivae normal.   Cardiovascular:      Rate and Rhythm: Normal rate.   Pulmonary:      Effort: Pulmonary effort is normal.   Abdominal:      Tenderness: There is no guarding.   Musculoskeletal:         General: No deformity.   Skin:     General: Skin is warm and dry.   Neurological:      Mental Status: He is alert and oriented to person, place, and time.   Psychiatric:         Mood and Affect: Mood normal.         Behavior: Behavior normal.        Labs and images reviewed as noted in the HPI    Assessment and plan:    Diagnoses and all orders for this visit:    1. Type 2 diabetes mellitus with stage 3b chronic kidney disease, with long-term current use of insulin (Primary)  Assessment & Plan:  -Diabetes remains uncontrolled due to hyperglycemia  -Complications include " known diabetic nephropathy with CKD IIIb, CAD with heart failure and diabetic polyneuropathy  -Patient previously with diabetic retinopathy that has subsequently resolved  -Patient remains high risk for complications due to persistent hyperglycemia; counseled that long term hyperglycemia can cause worsening neuropathy, kidney damage, eye damage and cardiovascular disease  -Patient continues to be somewhat haphazard with his insulin dosing making adjustments very difficult  -Due to cost of medication and some side effects he stopped Ozempic and is not interested in resuming GLP-1 agonist  -Continue Jardiance 10 mg daily  -Recommend Tresiba 24 units twice daily  -Recommend NovoLog 18 units with large meals, 8 units with small meals and adding a correction factor of 4 units if his Premeal blood glucoses above 200 mg/dL  -Continue Dexcom G7  -Intolerant to ACEi/ARB due to CKD with hyperkalemia; blood pressure today 118/60     DM Health Maintenance:  Ophtho: 4/4/2024 showing primary open-angle glaucoma, no diabetic retinopathy seen, age-related cataract of the right eye and posterior vitreous detachment  Monofilament / Foot exam: Completed 3/1/2023  Lipids/Statin: taking rosuvastatin 20mg daily, managed by PCP  GABRIEL: not indicated as he is being followed for CKD IIIb by nephrology  TSH: 2.200 done 10/20/2023   Aspirin: not taking as he takes plavix     Orders:  -     POC Glycosylated Hemoglobin (Hb A1C)  -     POC Glucose, Blood  -     insulin aspart (NovoLOG FlexPen) 100 UNIT/ML solution pen-injector sc pen; Inject 18u with large meals and 8u with small meals; if BG > 200mg/dl add 4u; max daily dose 50u  Dispense: 15 mL; Refill: 11  -     Insulin Degludec (Tresiba FlexTouch) 200 UNIT/ML solution pen-injector pen injection; Inject 24u twice daily, titrate for max daily dose 50u  Dispense: 15 mL; Refill: 11  -     empagliflozin (JARDIANCE) 10 MG tablet tablet; Take 1 tablet by mouth Daily.  Dispense: 30 tablet; Refill:  11         Return in about 3 months (around 7/25/2024) for T2DM.     Electronically signed by: Kyle S Rosenstein, MD

## 2024-04-25 ENCOUNTER — OFFICE VISIT (OUTPATIENT)
Dept: ENDOCRINOLOGY | Facility: CLINIC | Age: 79
End: 2024-04-25
Payer: MEDICARE

## 2024-04-25 VITALS
SYSTOLIC BLOOD PRESSURE: 118 MMHG | DIASTOLIC BLOOD PRESSURE: 60 MMHG | OXYGEN SATURATION: 98 % | BODY MASS INDEX: 33.46 KG/M2 | HEIGHT: 71 IN | HEART RATE: 78 BPM | WEIGHT: 239 LBS

## 2024-04-25 DIAGNOSIS — Z79.4 TYPE 2 DIABETES MELLITUS WITH STAGE 3B CHRONIC KIDNEY DISEASE, WITH LONG-TERM CURRENT USE OF INSULIN: Primary | ICD-10-CM

## 2024-04-25 DIAGNOSIS — N18.32 TYPE 2 DIABETES MELLITUS WITH STAGE 3B CHRONIC KIDNEY DISEASE, WITH LONG-TERM CURRENT USE OF INSULIN: Primary | ICD-10-CM

## 2024-04-25 DIAGNOSIS — E11.22 TYPE 2 DIABETES MELLITUS WITH STAGE 3B CHRONIC KIDNEY DISEASE, WITH LONG-TERM CURRENT USE OF INSULIN: Primary | ICD-10-CM

## 2024-04-25 LAB
EXPIRATION DATE: ABNORMAL
EXPIRATION DATE: ABNORMAL
GLUCOSE BLDC GLUCOMTR-MCNC: 179 MG/DL (ref 70–130)
HBA1C MFR BLD: 7.1 % (ref 4.5–5.7)
Lab: ABNORMAL
Lab: ABNORMAL

## 2024-04-25 RX ORDER — INSULIN ASPART 100 [IU]/ML
INJECTION, SOLUTION INTRAVENOUS; SUBCUTANEOUS
Qty: 15 ML | Refills: 11 | Status: SHIPPED | OUTPATIENT
Start: 2024-04-25

## 2024-04-25 RX ORDER — INSULIN DEGLUDEC 200 U/ML
INJECTION, SOLUTION SUBCUTANEOUS
Qty: 15 ML | Refills: 11 | Status: SHIPPED | OUTPATIENT
Start: 2024-04-25

## 2024-04-25 RX ORDER — ACYCLOVIR 400 MG/1
1 TABLET ORAL
Qty: 9 EACH | Refills: 3 | Status: SHIPPED | OUTPATIENT
Start: 2024-04-25

## 2024-04-25 NOTE — ASSESSMENT & PLAN NOTE
-Diabetes remains uncontrolled due to hyperglycemia  -Complications include known diabetic nephropathy with CKD IIIb, CAD with heart failure and diabetic polyneuropathy  -Patient previously with diabetic retinopathy that has subsequently resolved  -Patient remains high risk for complications due to persistent hyperglycemia; counseled that long term hyperglycemia can cause worsening neuropathy, kidney damage, eye damage and cardiovascular disease  -Patient continues to be somewhat haphazard with his insulin dosing making adjustments very difficult  -Due to cost of medication and some side effects he stopped Ozempic and is not interested in resuming GLP-1 agonist  -Continue Jardiance 10 mg daily  -Recommend Tresiba 24 units twice daily  -Recommend NovoLog 18 units with large meals, 8 units with small meals and adding a correction factor of 4 units if his Premeal blood glucoses above 200 mg/dL  -Continue Dexcom G7  -Intolerant to ACEi/ARB due to CKD with hyperkalemia; blood pressure today 118/60     DM Health Maintenance:  Ophtho: 4/4/2024 showing primary open-angle glaucoma, no diabetic retinopathy seen, age-related cataract of the right eye and posterior vitreous detachment  Monofilament / Foot exam: Completed 3/1/2023  Lipids/Statin: taking rosuvastatin 20mg daily, managed by PCP  GABRIEL: not indicated as he is being followed for CKD IIIb by nephrology  TSH: 2.200 done 10/20/2023   Aspirin: not taking as he takes plavix

## 2024-05-15 ENCOUNTER — PRIOR AUTHORIZATION (OUTPATIENT)
Dept: ENDOCRINOLOGY | Facility: CLINIC | Age: 79
End: 2024-05-15
Payer: MEDICARE

## 2024-05-16 NOTE — TELEPHONE ENCOUNTER
Deniedon May 15  Request Reference Number: [id number]. DEXCOM G7 MIS SENSOR is denied for not meeting the prior authorization requirement(s). Details of this decision are in the notice attached below or have been faxed to you.  Drug  Dexcom G7 Sensor  Form  [employer] Medicare Part D Electronic Prior Authorization Form (2017 [place])

## 2024-05-21 ENCOUNTER — OFFICE VISIT (OUTPATIENT)
Dept: FAMILY MEDICINE CLINIC | Facility: CLINIC | Age: 79
End: 2024-05-21
Payer: MEDICARE

## 2024-05-21 VITALS
OXYGEN SATURATION: 96 % | DIASTOLIC BLOOD PRESSURE: 60 MMHG | SYSTOLIC BLOOD PRESSURE: 112 MMHG | BODY MASS INDEX: 33.46 KG/M2 | HEART RATE: 69 BPM | WEIGHT: 239 LBS | HEIGHT: 71 IN

## 2024-05-21 DIAGNOSIS — I10 ESSENTIAL HYPERTENSION: ICD-10-CM

## 2024-05-21 DIAGNOSIS — N18.32 TYPE 2 DIABETES MELLITUS WITH STAGE 3B CHRONIC KIDNEY DISEASE, WITH LONG-TERM CURRENT USE OF INSULIN: ICD-10-CM

## 2024-05-21 DIAGNOSIS — E11.22 TYPE 2 DIABETES MELLITUS WITH STAGE 3B CHRONIC KIDNEY DISEASE, WITH LONG-TERM CURRENT USE OF INSULIN: ICD-10-CM

## 2024-05-21 DIAGNOSIS — I73.9 PVD (PERIPHERAL VASCULAR DISEASE): ICD-10-CM

## 2024-05-21 DIAGNOSIS — E11.22 CKD STAGE 3 DUE TO TYPE 2 DIABETES MELLITUS: ICD-10-CM

## 2024-05-21 DIAGNOSIS — Z79.4 TYPE 2 DIABETES MELLITUS WITH STAGE 3B CHRONIC KIDNEY DISEASE, WITH LONG-TERM CURRENT USE OF INSULIN: ICD-10-CM

## 2024-05-21 DIAGNOSIS — E78.2 MIXED HYPERLIPIDEMIA: ICD-10-CM

## 2024-05-21 DIAGNOSIS — H91.93 BILATERAL HEARING LOSS, UNSPECIFIED HEARING LOSS TYPE: ICD-10-CM

## 2024-05-21 DIAGNOSIS — N18.30 CKD STAGE 3 DUE TO TYPE 2 DIABETES MELLITUS: ICD-10-CM

## 2024-05-21 DIAGNOSIS — Z00.00 WELLNESS EXAMINATION: Primary | ICD-10-CM

## 2024-05-21 NOTE — PROGRESS NOTES
The ABCs of the Annual Wellness Visit  Subsequent Medicare Wellness Visit    Subjective      Elzbieta Nunez is a 79 y.o. male who presents for a Subsequent Medicare Wellness Visit.    The following portions of the patient's history were reviewed and   updated as appropriate: allergies, current medications, past family history, past medical history, past social history, past surgical history, and problem list.    Compared to one year ago, the patient feels his physical   health is better.    Compared to one year ago, the patient feels his mental   health is the same.    He states that he has been having some trouble with swelling and states that he increased his Bumex to BID rather then daily over the past week or so. He states that he has had some tightness in his chest, but this has improved at this time. He states that he has been having some trouble with worsening fatigue and is not sure what has changed.       Recent Hospitalizations:  He was admitted within the past 365 days at [facility] for CHF exacerbation hospital.       Current Medical Providers:  Patient Care Team:  Divina Delgado DO as PCP - General (Family Medicine)  Rosenstein, Kyle S, MD as Consulting Physician (Endocrinology)  Lily Hermosillo, ABDULKADIR as Ambulatory  ([facility])  Dr. Brooks: Cardiology ([facility])    Outpatient Medications Prior to Visit   Medication Sig Dispense Refill    allopurinol (ZYLOPRIM) 100 MG tablet TAKE 1 TABLET BY MOUTH DAILY 90 tablet 1    amLODIPine (NORVASC) 10 MG tablet TAKE 1 TABLET BY MOUTH DAILY 90 tablet 1    bumetanide (BUMEX) 2 MG tablet Take 1 tablet by mouth Daily. 90 tablet 1    carvedilol (COREG) 25 MG tablet TAKE 1 TABLET BY MOUTH TWICE A DAY WITH A MEAL 180 tablet 1    chlorthalidone (HYGROTEN) 50 MG tablet Take 0.5 tablets by mouth Daily.      citalopram (CeleXA) 20 MG tablet TAKE ONE TABLET BY MOUTH DAILY 90 tablet 1    clopidogrel (PLAVIX) 75 MG tablet TAKE ONE TABLET BY MOUTH  DAILY 90 tablet 1    Continuous Glucose Sensor (Dexcom G7 Sensor) misc Use 1 Device Every 10 (Ten) Days. 9 each 3    dorzolamide-timolol (COSOPT) 22.3-6.8 MG/ML ophthalmic solution       empagliflozin (JARDIANCE) 10 MG tablet tablet Take 1 tablet by mouth Daily. 30 tablet 11    ferrous sulfate 324 (65 Fe) MG tablet delayed-release EC tablet TAKE ONE TABLET BY MOUTH DAILY WITH BREAKFAST 90 tablet 1    gemfibrozil (LOPID) 600 MG tablet TAKE ONE TABLET BY MOUTH TWICE A  tablet 1    hydrALAZINE (APRESOLINE) 100 MG tablet Take 1 tablet by mouth 3 (Three) Times a Day. 270 tablet 1    insulin aspart (NovoLOG FlexPen) 100 UNIT/ML solution pen-injector sc pen Inject 18u with large meals and 8u with small meals; if BG > 200mg/dl add 4u; max daily dose 50u 15 mL 11    Insulin Degludec (Tresiba FlexTouch) 200 UNIT/ML solution pen-injector pen injection Inject 24u twice daily, titrate for max daily dose 50u 15 mL 11    Insulin Pen Needle (B-D ULTRAFINE III SHORT PEN) 31G X 8 MM misc Use as directed up to 6 times per day 200 each 11    isosorbide mononitrate (IMDUR) 120 MG 24 hr tablet       latanoprost (XALATAN) 0.005 % ophthalmic solution       neomycin-polymyxin-hydrocortisone (CORTISPORIN) 3.5-62819-6 otic solution Administer 3 drops to the right ear 4 (Four) Times a Day. 10 mL 0    pantoprazole (PROTONIX) 40 MG EC tablet TAKE 1 TABLET BY MOUTH TWICE A  tablet 1    rosuvastatin (CRESTOR) 20 MG tablet TAKE ONE TABLET BY MOUTH DAILY 90 tablet 1     No facility-administered medications prior to visit.       No opioid medication identified on active medication list. I have reviewed chart for other potential  high risk medication/s and harmful drug interactions in the elderly.        Aspirin is not on active medication list.  Aspirin use is indicated based on review of current medical condition/s. Pros and cons of this therapy have been discussed with this patient. Benefits of this medication outweigh potential harm.  " Patient has been instructed to start taking this medication..    Patient Active Problem List   Diagnosis    Essential hypertension    Mixed hyperlipidemia    PVD (peripheral vascular disease)    Type 2 diabetes mellitus with stage 3b chronic kidney disease, with long-term current use of insulin     Advance Care Planning   Advance Care Planning     Advance Directive is not on file.  ACP discussion was declined by the patient. Patient does not have an advance directive, declines further assistance.     Objective    Vitals:    24 1411   BP: 112/60   Pulse: 69   SpO2: 96%   Weight: 108 kg (239 lb)   Height: 180.3 cm (71\")     Estimated body mass index is 33.33 kg/m² as calculated from the following:    Height as of this encounter: 180.3 cm (71\").    Weight as of this encounter: 108 kg (239 lb).    BMI is >= 30 and <35. (Class 1 Obesity). The following options were offered after discussion;: weight loss educational material (shared in after visit summary)      Does the patient have evidence of cognitive impairment?   No    Lab Results   Component Value Date    HGBA1C 7.1 (A) 2024          HEALTH RISK ASSESSMENT    Smoking Status:  Social History     Tobacco Use   Smoking Status Former    Current packs/day: 0.00    Average packs/day: 1 pack/day for 46.0 years (46.0 ttl pk-yrs)    Types: Cigarettes    Start date:     Quit date:     Years since quittin.3    Passive exposure: Past   Smokeless Tobacco Never     Alcohol Consumption:  Social History     Substance and Sexual Activity   Alcohol Use Not Currently     Fall Risk Screen:    VIKASHADI Fall Risk Assessment was completed, and patient is at LOW risk for falls.Assessment completed on:2024    Depression Screenin/21/2024     2:13 PM   PHQ-2/PHQ-9 Depression Screening   Little Interest or Pleasure in Doing Things 0-->not at all   Feeling Down, Depressed or Hopeless 0-->not at all   PHQ-9: Brief Depression Severity Measure Score 0 "       Health Habits and Functional and Cognitive Screenin/21/2024     2:14 PM   Functional & Cognitive Status   Do you have difficulty preparing food and eating? No   Do you have difficulty bathing yourself, getting dressed or grooming yourself? No   Do you have difficulty using the toilet? No   Do you have difficulty moving around from place to place? No   Do you have trouble with steps or getting out of a bed or a chair? No   Current Diet Well Balanced Diet   Dental Exam Not up to date   Eye Exam Up to date   Exercise (times per week) 0 times per week   Current Exercises Include No Regular Exercise   Do you need help using the phone?  No   Are you deaf or do you have serious difficulty hearing?  Yes   Do you need help to go to places out of walking distance? No   Do you need help shopping? No   Do you need help preparing meals?  No   Do you need help with housework?  No   Do you need help with laundry? No   Do you need help taking your medications? No   Do you need help managing money? No   Do you ever drive or ride in a car without wearing a seat belt? Yes   Have you felt unusual stress, anger or loneliness in the last month? No   Who do you live with? Spouse   If you need help, do you have trouble finding someone available to you? No   Have you been bothered in the last four weeks by sexual problems? No   Do you have difficulty concentrating, remembering or making decisions? No       Age-appropriate Screening Schedule:  Refer to the list below for future screening recommendations based on patient's age, sex and/or medical conditions. Orders for these recommended tests are listed in the plan section. The patient has been provided with a written plan.    Health Maintenance   Topic Date Due    ANNUAL WELLNESS VISIT  Never done    URINE MICROALBUMIN  2023    ZOSTER VACCINE (2 of 3) 2024 (Originally 2013)    COVID-19 Vaccine (2023- season) 08/10/2024 (Originally 2023)    RSV  Vaccine - Adults (1 - 1-dose 60+ series) 02/28/2025 (Originally 4/3/2006)    TDAP/TD VACCINES (1 - Tdap) 02/28/2025 (Originally 4/3/1964)    HEPATITIS C SCREENING  05/21/2025 (Originally 4/18/2022)    INFLUENZA VACCINE  08/01/2024    LIPID PANEL  10/20/2024    HEMOGLOBIN A1C  10/25/2024    DIABETIC EYE EXAM  04/04/2025    BMI FOLLOWUP  05/21/2025    Pneumococcal Vaccine 65+  Completed    COLORECTAL CANCER SCREENING  Discontinued                  CMS Preventative Services Quick Reference  Risk Factors Identified During Encounter:    Hearing Problem: Referral to ENT ordered  Immunizations Discussed/Encouraged: Tdap, Prevnar 20 (Pneumococcal 20-valent conjugate), Shingrix, COVID19, and RSV (Respiratory Syncytial Virus)    The above risks/problems have been discussed with the patient.  Pertinent information has been shared with the patient in the After Visit Summary.    Diagnoses and all orders for this visit:    1. Wellness examination (Primary)    2. CKD stage 3 due to type 2 diabetes mellitus  -     Comprehensive Metabolic Panel  -     CBC & Differential    3. Essential hypertension  -     Comprehensive Metabolic Panel  -     CBC & Differential    4. Mixed hyperlipidemia  -     Lipid Panel    5. PVD (peripheral vascular disease)    6. Type 2 diabetes mellitus with stage 3b chronic kidney disease, with long-term current use of insulin  -     Microalbumin / Creatinine Urine Ratio - Urine, Clean Catch; Future    7. Bilateral hearing loss, unspecified hearing loss type  -     Ambulatory Referral to ENT (Otolaryngology)    Blood work ordered and will contact with results when available. Will adjust medications based on abnormalities seen.     ENT evaluation for hearing loss.     Past medical and surgical history as well as allergies, family history and social history were reviewed, and discussed with patient.  Chronic conditions were reviewed as well as medications.   Anticipatory guidance handouts including healthy diet,  health maintenance, as well as regular exercise and general instructions were given via [employer]erlyhart, and patient was able to ask questions and discuss any concerns.        Follow Up:   Next Medicare Wellness visit to be scheduled in 1 year.      An After Visit Summary and PPPS were made available to the patient.

## 2024-05-22 LAB
ALBUMIN SERPL-MCNC: 4 G/DL (ref 3.8–4.8)
ALBUMIN/CREAT UR: 487 MG/G CREAT (ref 0–29)
ALBUMIN/GLOB SERPL: 1.3 {RATIO} (ref 1.2–2.2)
ALP SERPL-CCNC: 108 IU/L (ref 44–121)
ALT SERPL-CCNC: 14 IU/L (ref 0–44)
AST SERPL-CCNC: 12 IU/L (ref 0–40)
BASOPHILS # BLD AUTO: 0 X10E3/UL (ref 0–0.2)
BASOPHILS NFR BLD AUTO: 0 %
BILIRUB SERPL-MCNC: <0.2 MG/DL (ref 0–1.2)
BUN SERPL-MCNC: 48 MG/DL (ref 8–27)
BUN/CREAT SERPL: 22 (ref 10–24)
CALCIUM SERPL-MCNC: 8.9 MG/DL (ref 8.6–10.2)
CHLORIDE SERPL-SCNC: 109 MMOL/L (ref 96–106)
CHOLEST SERPL-MCNC: 109 MG/DL (ref 100–199)
CO2 SERPL-SCNC: 18 MMOL/L (ref 20–29)
CREAT SERPL-MCNC: 2.19 MG/DL (ref 0.76–1.27)
CREAT UR-MCNC: 83.9 MG/DL
EGFRCR SERPLBLD CKD-EPI 2021: 30 ML/MIN/1.73
EOSINOPHIL # BLD AUTO: 0.1 X10E3/UL (ref 0–0.4)
EOSINOPHIL NFR BLD AUTO: 2 %
ERYTHROCYTE [DISTWIDTH] IN BLOOD BY AUTOMATED COUNT: 13.7 % (ref 11.6–15.4)
GLOBULIN SER CALC-MCNC: 3.1 G/DL (ref 1.5–4.5)
GLUCOSE SERPL-MCNC: 147 MG/DL (ref 70–99)
HCT VFR BLD AUTO: 27.3 % (ref 37.5–51)
HDLC SERPL-MCNC: 31 MG/DL
HGB BLD-MCNC: 8.8 G/DL (ref 13–17.7)
IMM GRANULOCYTES # BLD AUTO: 0 X10E3/UL (ref 0–0.1)
IMM GRANULOCYTES NFR BLD AUTO: 0 %
LDLC SERPL CALC-MCNC: 61 MG/DL (ref 0–99)
LYMPHOCYTES # BLD AUTO: 1.9 X10E3/UL (ref 0.7–3.1)
LYMPHOCYTES NFR BLD AUTO: 34 %
MCH RBC QN AUTO: 33.1 PG (ref 26.6–33)
MCHC RBC AUTO-ENTMCNC: 32.2 G/DL (ref 31.5–35.7)
MCV RBC AUTO: 103 FL (ref 79–97)
MICROALBUMIN UR-MCNC: 408.5 UG/ML
MONOCYTES # BLD AUTO: 0.7 X10E3/UL (ref 0.1–0.9)
MONOCYTES NFR BLD AUTO: 13 %
NEUTROPHILS # BLD AUTO: 2.9 X10E3/UL (ref 1.4–7)
NEUTROPHILS NFR BLD AUTO: 51 %
PLATELET # BLD AUTO: 295 X10E3/UL (ref 150–450)
POTASSIUM SERPL-SCNC: 5.5 MMOL/L (ref 3.5–5.2)
PROT SERPL-MCNC: 7.1 G/DL (ref 6–8.5)
RBC # BLD AUTO: 2.66 X10E6/UL (ref 4.14–5.8)
SODIUM SERPL-SCNC: 143 MMOL/L (ref 134–144)
TRIGL SERPL-MCNC: 87 MG/DL (ref 0–149)
VLDLC SERPL CALC-MCNC: 17 MG/DL (ref 5–40)
WBC # BLD AUTO: 5.6 X10E3/UL (ref 3.4–10.8)

## 2024-05-29 ENCOUNTER — TELEPHONE (OUTPATIENT)
Dept: CASE MANAGEMENT | Facility: OTHER | Age: 79
End: 2024-05-29
Payer: MEDICARE

## 2024-05-30 ENCOUNTER — PATIENT OUTREACH (OUTPATIENT)
Dept: CASE MANAGEMENT | Facility: OTHER | Age: 79
End: 2024-05-30
Payer: MEDICARE

## 2024-05-30 DIAGNOSIS — N18.4 TYPE 2 DIABETES MELLITUS WITH STAGE 4 CHRONIC KIDNEY DISEASE, WITH LONG-TERM CURRENT USE OF INSULIN: Primary | ICD-10-CM

## 2024-05-30 DIAGNOSIS — E11.22 TYPE 2 DIABETES MELLITUS WITH STAGE 4 CHRONIC KIDNEY DISEASE, WITH LONG-TERM CURRENT USE OF INSULIN: Primary | ICD-10-CM

## 2024-05-30 DIAGNOSIS — Z79.4 TYPE 2 DIABETES MELLITUS WITH STAGE 4 CHRONIC KIDNEY DISEASE, WITH LONG-TERM CURRENT USE OF INSULIN: Primary | ICD-10-CM

## 2024-05-30 DIAGNOSIS — I10 ESSENTIAL HYPERTENSION: ICD-10-CM

## 2024-05-30 NOTE — OUTREACH NOTE
AMBULATORY CASE MANAGEMENT NOTE    Names and Relationships of Patient/Support Persons: Caller: Elzbieta Nunez; Relationship: Self  Caller: Jessica Enrique; Relationship: Spouse -     [facility] Interim Update    Wife reports patient is doing well. BP remains within normal range. She states patient continues to go to [facility] for neck pain. She states it has helped some, but he continues to have pain. He is also struggling with pain in ear. She inquired about ENT referral placed 5/21. Provided number to Dr. Martínez's office to schedule appointment.     Also discussed recent lab values with patient's spouse. Indicated that PCP wants patient to follow-up with nephrology regarding abnormal blood work. Wife reports patient has upcoming appointment with nephrologist. Advised patient to bring a copy of lab values to nephrology appointment for review. Spouse agreed.         Education Documentation  No documentation found.        Lily WADSWORTH  Ambulatory Case Management    5/30/2024, 14:44 EDT

## 2024-06-20 ENCOUNTER — TELEPHONE (OUTPATIENT)
Dept: FAMILY MEDICINE CLINIC | Facility: CLINIC | Age: 79
End: 2024-06-20

## 2024-06-20 ENCOUNTER — READMISSION MANAGEMENT (OUTPATIENT)
Dept: CALL CENTER | Facility: HOSPITAL | Age: 79
End: 2024-06-20
Payer: MEDICARE

## 2024-06-20 NOTE — TELEPHONE ENCOUNTER
Caller: Elzbieta Nunez    Relationship to patient: Self    Best call back number: [phone]     New or established patient?  [] New  [x] Established    Date of discharge: 06/20/2024     Facility discharged from: [facility] MAIN     Diagnosis/Symptoms: CONGESTIVE HEART FAILURE     PLEASE GET HOSPITAL RECORDS FOR PATIENT'S SCHEDULED HOSPITAL FOLLOW UP 06/28/204

## 2024-06-20 NOTE — OUTREACH NOTE
Prep Survey      Flowsheet Row Responses   [facility]   Is LACE score < 7 ? Non-BH Discharge   Eligibility [facility]   Date of Admission 06/16/24   Date of Discharge 06/20/24   Discharge diagnosis Acute combined systolic and diastolic congestive heart failure (HCC)   Does the patient have one of the following disease processes/diagnoses(primary or secondary)? CHF   Prep survey completed? Yes            Viry Cummins Registered Nurse

## 2024-06-21 ENCOUNTER — TELEPHONE (OUTPATIENT)
Dept: FAMILY MEDICINE CLINIC | Facility: CLINIC | Age: 79
End: 2024-06-21
Payer: MEDICARE

## 2024-06-21 ENCOUNTER — TRANSITIONAL CARE MANAGEMENT TELEPHONE ENCOUNTER (OUTPATIENT)
Dept: CALL CENTER | Facility: HOSPITAL | Age: 79
End: 2024-06-21
Payer: MEDICARE

## 2024-06-21 NOTE — OUTREACH NOTE
Call Center TCM Note      Flowsheet Row Responses   [facility] patient discharged from? Non-  [[facility]]   Does the patient have one of the following disease processes/diagnoses(primary or secondary)? CHF   TCM attempt successful? Yes   Call start time 1054   Call end time 1100   Discharge diagnosis Acute combined systolic and diastolic congestive heart failure (HCC)   Person spoke with today (if not patient) and relationship wife   Meds reviewed with patient/caregiver? Yes   Is the patient having any side effects they believe may be caused by any medication additions or changes? No   Does the patient have all medications ordered at discharge? Yes   Is the patient taking all medications as directed (includes completed medication regime)? Yes   Comments Patient has a hospital followup with DONATO Segovia on 6/28/24. patient was hospital at [facility] for CHF.   Does the patient have an appointment with their PCP within 7-14 days of discharge? Yes   Psychosocial issues? No   Did the patient receive a copy of their discharge instructions? Yes   Nursing interventions Reviewed instructions with patient   What is the patient's perception of their health status since discharge? Improving   Is the patient/caregiver able to teach back signs and symptoms related to disease process for when to call PCP? Yes   Is the patient/caregiver able to teach back signs and symptoms related to disease process for when to call 911? Yes   Is the patient/caregiver able to teach back the hierarchy of who to call/visit for symptoms/problems? PCP, Specialist, Home health nurse, Urgent Care, ED, 911 Yes   If the patient is a current smoker, are they able to teach back resources for cessation? Not a smoker   TCM call completed? Yes   Wrap up additional comments Family reports patient is doing well.   Call end time 1100   Would this patient benefit from a Referral to [facility]? No   Is the patient interested in additional calls  from an ambulatory ? No            Gregorio Sosa RN    6/21/2024, 11:02 EDT

## 2024-06-21 NOTE — TELEPHONE ENCOUNTER
[facility] CALLED. PT IS STARTING HOME HEALTH TODAY. THEY ARE FAXING INFORMATION/ORDERS.

## 2024-06-28 ENCOUNTER — TELEPHONE (OUTPATIENT)
Dept: FAMILY MEDICINE CLINIC | Facility: CLINIC | Age: 79
End: 2024-06-28

## 2024-06-28 ENCOUNTER — OFFICE VISIT (OUTPATIENT)
Dept: FAMILY MEDICINE CLINIC | Facility: CLINIC | Age: 79
End: 2024-06-28
Payer: MEDICARE

## 2024-06-28 VITALS — SYSTOLIC BLOOD PRESSURE: 124 MMHG | OXYGEN SATURATION: 96 % | DIASTOLIC BLOOD PRESSURE: 58 MMHG | HEART RATE: 57 BPM

## 2024-06-28 DIAGNOSIS — Z79.4 TYPE 2 DIABETES MELLITUS WITH STAGE 3B CHRONIC KIDNEY DISEASE, WITH LONG-TERM CURRENT USE OF INSULIN: ICD-10-CM

## 2024-06-28 DIAGNOSIS — I50.32 CHRONIC HEART FAILURE WITH PRESERVED EJECTION FRACTION: ICD-10-CM

## 2024-06-28 DIAGNOSIS — N18.32 TYPE 2 DIABETES MELLITUS WITH STAGE 3B CHRONIC KIDNEY DISEASE, WITH LONG-TERM CURRENT USE OF INSULIN: ICD-10-CM

## 2024-06-28 DIAGNOSIS — E78.2 MIXED HYPERLIPIDEMIA: Primary | ICD-10-CM

## 2024-06-28 DIAGNOSIS — E11.22 TYPE 2 DIABETES MELLITUS WITH STAGE 3B CHRONIC KIDNEY DISEASE, WITH LONG-TERM CURRENT USE OF INSULIN: ICD-10-CM

## 2024-06-28 DIAGNOSIS — K21.9 GASTROESOPHAGEAL REFLUX DISEASE, UNSPECIFIED WHETHER ESOPHAGITIS PRESENT: ICD-10-CM

## 2024-06-28 DIAGNOSIS — I10 ESSENTIAL HYPERTENSION: ICD-10-CM

## 2024-06-28 PROCEDURE — 3074F SYST BP LT 130 MM HG: CPT | Performed by: STUDENT IN AN ORGANIZED HEALTH CARE EDUCATION/TRAINING PROGRAM

## 2024-06-28 PROCEDURE — 3078F DIAST BP <80 MM HG: CPT | Performed by: STUDENT IN AN ORGANIZED HEALTH CARE EDUCATION/TRAINING PROGRAM

## 2024-06-28 PROCEDURE — 1111F DSCHRG MED/CURRENT MED MERGE: CPT | Performed by: STUDENT IN AN ORGANIZED HEALTH CARE EDUCATION/TRAINING PROGRAM

## 2024-06-28 PROCEDURE — 99495 TRANSJ CARE MGMT MOD F2F 14D: CPT | Performed by: STUDENT IN AN ORGANIZED HEALTH CARE EDUCATION/TRAINING PROGRAM

## 2024-06-28 PROCEDURE — 1126F AMNT PAIN NOTED NONE PRSNT: CPT | Performed by: STUDENT IN AN ORGANIZED HEALTH CARE EDUCATION/TRAINING PROGRAM

## 2024-06-28 RX ORDER — HYDRALAZINE HYDROCHLORIDE 100 MG/1
100 TABLET, FILM COATED ORAL 3 TIMES DAILY
Qty: 270 TABLET | Refills: 1 | Status: SHIPPED | OUTPATIENT
Start: 2024-06-28

## 2024-06-28 RX ORDER — ALBUTEROL SULFATE 90 UG/1
1 AEROSOL, METERED RESPIRATORY (INHALATION) EVERY 6 HOURS PRN
COMMUNITY
Start: 2024-06-20 | End: 2025-06-20

## 2024-06-28 RX ORDER — GEMFIBROZIL 600 MG/1
600 TABLET, FILM COATED ORAL 2 TIMES DAILY
Qty: 180 TABLET | Refills: 1 | Status: SHIPPED | OUTPATIENT
Start: 2024-06-28

## 2024-06-28 RX ORDER — METOLAZONE 2.5 MG/1
2.5 TABLET ORAL 3 TIMES WEEKLY
COMMUNITY
Start: 2024-06-21 | End: 2024-08-20

## 2024-06-28 RX ORDER — FERROUS SULFATE 324(65)MG
324 TABLET, DELAYED RELEASE (ENTERIC COATED) ORAL
Qty: 90 TABLET | Refills: 1 | Status: SHIPPED | OUTPATIENT
Start: 2024-06-28

## 2024-06-28 RX ORDER — PANTOPRAZOLE SODIUM 40 MG/1
40 TABLET, DELAYED RELEASE ORAL 2 TIMES DAILY
Qty: 180 TABLET | Refills: 1 | Status: SHIPPED | OUTPATIENT
Start: 2024-06-28

## 2024-06-28 RX ORDER — ROSUVASTATIN CALCIUM 20 MG/1
20 TABLET, COATED ORAL DAILY
Qty: 90 TABLET | Refills: 1 | Status: SHIPPED | OUTPATIENT
Start: 2024-06-28

## 2024-06-28 NOTE — PROGRESS NOTES
Transitional Care Follow Up Visit  Subjective     Elzbieta Nunez is a 79 y.o. male who presents for a transitional care management visit.    Within 48 business hours after discharge our office contacted him via telephone to coordinate his care and needs.      I reviewed and discussed the details of that call along with the discharge summary, hospital problems, inpatient lab results, inpatient diagnostic studies, and consultation reports with Elzbieta.     Current outpatient and discharge medications have been reconciled for the patient.  Reviewed by: Divina Delgado DO          6/20/2024     5:20 PM   Date of TCM Phone Call   [facility]   Date of Admission 6/16/2024   Date of Discharge 6/20/2024     Risk for Readmission (LACE) No data recorded    History of Present Illness    79-year-old male presented to outside hospital on 6/16/2024 with shortness of breath and hypoxia at which time he was admitted for acute hypoxic respiratory failure secondary to diastolic congestive heart failure exacerbation.  While he was admitted to the hospital he was treated with IV Bumex and was discharged with higher dose of Bumex and was discharged with a ProAir inhaler as well as a Medrol Dosepak.    It is recommended that he hold his Jardiance upon discharge, however patient has seen nephrology since and was restarted on this medication.    Patient has upcoming appointment with cardiology.     Patient and wife states that he has been doing better since discharge. He is currently trying to wean off of the oxygen. He is currently on 2L.     He has had chest pain once since he was discharged, but none in the past week. He has appointment with cardiology in about 10 days.     He is due for blood work at this time.       Dr. Perez--> Blood work [facility]    Objective   Vital Signs:   /58   Pulse 57   SpO2 96%     There is no height or weight on file to calculate BMI.    Review of  Systems    Past History:  Medical History: has a past medical history of Acute otitis externa, Acute otitis media, Atypical chest pain, Cellulitis, Diabetes mellitus type 1, Dysuria, Esophagitis, Gastritis, Gastroenteritis, Glucosuria, Hyperlipidemia, Hypertension, Hypothyroidism, Insulin dependent diabetes mellitus type IA, Nicotine dependence, Obesity, Penetrating foreign body of skin of index finger, initial encounter, Peripheral vascular disease, and Sinusitis.   Surgical History: has a past surgical history that includes Leg Biopsy (02/01/2009).   Family History: family history includes Developmental delay (age of onset: 74) in his mother; Diabetes (age of onset: 90) in his father; Hypertension (age of onset: 90) in his father.   Social History: reports that he quit smoking about 2 years ago. His smoking use included cigarettes. He started smoking about 48 years ago. He has a 46 pack-year smoking history. He has been exposed to tobacco smoke. He has never used smokeless tobacco. He reports that he does not currently use alcohol. Drug use questions deferred to the physician.      Current Outpatient Medications:     albuterol sulfate  (90 Base) MCG/ACT inhaler, Inhale 1 puff Every 6 (Six) Hours As Needed., Disp: , Rfl:     ferrous sulfate 324 (65 Fe) MG tablet delayed-release EC tablet, Take 1 tablet by mouth Daily With Breakfast., Disp: 90 tablet, Rfl: 1    gemfibrozil (LOPID) 600 MG tablet, Take 1 tablet by mouth 2 (Two) Times a Day., Disp: 180 tablet, Rfl: 1    hydrALAZINE (APRESOLINE) 100 MG tablet, Take 1 tablet by mouth 3 (Three) Times a Day., Disp: 270 tablet, Rfl: 1    metOLazone (ZAROXOLYN) 2.5 MG tablet, Take 1 tablet by mouth 3 (Three) Times a Week., Disp: , Rfl:     pantoprazole (PROTONIX) 40 MG EC tablet, Take 1 tablet by mouth 2 (Two) Times a Day., Disp: 180 tablet, Rfl: 1    rosuvastatin (CRESTOR) 20 MG tablet, Take 1 tablet by mouth Daily., Disp: 90 tablet, Rfl: 1    allopurinol (ZYLOPRIM)  100 MG tablet, TAKE 1 TABLET BY MOUTH DAILY, Disp: 90 tablet, Rfl: 1    amLODIPine (NORVASC) 10 MG tablet, TAKE 1 TABLET BY MOUTH DAILY, Disp: 90 tablet, Rfl: 1    bumetanide (BUMEX) 2 MG tablet, Take 1 tablet by mouth Daily., Disp: 90 tablet, Rfl: 1    carvedilol (COREG) 25 MG tablet, TAKE 1 TABLET BY MOUTH TWICE A DAY WITH A MEAL, Disp: 180 tablet, Rfl: 1    citalopram (CeleXA) 20 MG tablet, TAKE ONE TABLET BY MOUTH DAILY, Disp: 90 tablet, Rfl: 1    clopidogrel (PLAVIX) 75 MG tablet, TAKE ONE TABLET BY MOUTH DAILY, Disp: 90 tablet, Rfl: 1    Continuous Glucose Sensor (Dexcom G7 Sensor) misc, Use 1 Device Every 10 (Ten) Days., Disp: 9 each, Rfl: 3    dorzolamide-timolol (COSOPT) 22.3-6.8 MG/ML ophthalmic solution, , Disp: , Rfl:     empagliflozin (JARDIANCE) 10 MG tablet tablet, Take 1 tablet by mouth Daily., Disp: 30 tablet, Rfl: 11    insulin aspart (NovoLOG FlexPen) 100 UNIT/ML solution pen-injector sc pen, Inject 18u with large meals and 8u with small meals; if BG > 200mg/dl add 4u; max daily dose 50u, Disp: 15 mL, Rfl: 11    Insulin Degludec (Tresiba FlexTouch) 200 UNIT/ML solution pen-injector pen injection, Inject 24u twice daily, titrate for max daily dose 50u, Disp: 15 mL, Rfl: 11    Insulin Pen Needle (B-D ULTRAFINE III SHORT PEN) 31G X 8 MM misc, Use as directed up to 6 times per day, Disp: 200 each, Rfl: 11    isosorbide mononitrate (IMDUR) 120 MG 24 hr tablet, , Disp: , Rfl:     latanoprost (XALATAN) 0.005 % ophthalmic solution, , Disp: , Rfl:     neomycin-polymyxin-hydrocortisone (CORTISPORIN) 3.5-77237-7 otic solution, Administer 3 drops to the right ear 4 (Four) Times a Day., Disp: 10 mL, Rfl: 0    Allergies: Moxifloxacin, Niacin, Oxycodone hcl, and Oxycodone-acetaminophen    Physical Exam  Constitutional:       General: He is not in acute distress.     Appearance: He is not ill-appearing or toxic-appearing.   HENT:      Head: Normocephalic and atraumatic.   Cardiovascular:      Rate and Rhythm:  Normal rate and regular rhythm.      Heart sounds: No murmur heard.  Pulmonary:      Effort: Pulmonary effort is normal. No respiratory distress.      Comments: Decreased breath sound BL, no Wheezing, or crackles  Musculoskeletal:      Right lower leg: No edema.      Left lower leg: No edema.   Neurological:      General: No focal deficit present.      Mental Status: He is alert and oriented to person, place, and time.   Psychiatric:         Mood and Affect: Mood normal.         Thought Content: Thought content normal.          Result Review :   The following data was reviewed by: Divina Delgado DO on 06/28/2024:  Common labs          2/5/2024    10:13 4/25/2024    13:41 5/21/2024    00:00 5/21/2024    14:51   Common Labs   Glucose 184    147    BUN 40    48    Creatinine 2.16    2.19    Sodium 141    143    Potassium 4.1    5.5    Chloride 102    109    Calcium 9.6    8.9    Total Protein    7.1    Albumin 4.1    4.0    Total Bilirubin    <0.2    Alkaline Phosphatase    108    AST (SGOT)    12    ALT (SGPT)    14    WBC 8.78    5.6    Hemoglobin 10.5    8.8    Hematocrit 31.5    27.3    Platelets 323    295    Total Cholesterol    109    Triglycerides    87    HDL Cholesterol    31    LDL Cholesterol     61    Hemoglobin A1C  7.1      Microalbumin, Urine   408.5       Data reviewed : Radiologic studies venous Doppler, echo, chest x-rays from 6/16 and 6/17 and Recent hospitalization notes hospital notes from hospitalization on 616 through 620             Assessment and Plan    Diagnoses and all orders for this visit:    1. Mixed hyperlipidemia (Primary)  -     Comprehensive Metabolic Panel  -     CBC & Differential  -     Magnesium  -     Iron and TIBC  -     Ferritin  -     Lipid Panel    2. Gastroesophageal reflux disease, unspecified whether esophagitis present  -     pantoprazole (PROTONIX) 40 MG EC tablet; Take 1 tablet by mouth 2 (Two) Times a Day.  Dispense: 180 tablet; Refill: 1  -     Comprehensive  Metabolic Panel  -     CBC & Differential  -     Magnesium  -     Iron and TIBC  -     Ferritin    3. Type 2 diabetes mellitus with stage 3b chronic kidney disease, with long-term current use of insulin  -     Comprehensive Metabolic Panel  -     CBC & Differential  -     Magnesium  -     Iron and TIBC  -     Ferritin  -     Lipid Panel    4. Essential hypertension  -     Comprehensive Metabolic Panel  -     CBC & Differential  -     Magnesium  -     Iron and TIBC  -     Ferritin    Other orders  -     gemfibrozil (LOPID) 600 MG tablet; Take 1 tablet by mouth 2 (Two) Times a Day.  Dispense: 180 tablet; Refill: 1  -     hydrALAZINE (APRESOLINE) 100 MG tablet; Take 1 tablet by mouth 3 (Three) Times a Day.  Dispense: 270 tablet; Refill: 1  -     ferrous sulfate 324 (65 Fe) MG tablet delayed-release EC tablet; Take 1 tablet by mouth Daily With Breakfast.  Dispense: 90 tablet; Refill: 1  -     rosuvastatin (CRESTOR) 20 MG tablet; Take 1 tablet by mouth Daily.  Dispense: 90 tablet; Refill: 1    Blood work ordered and will contact with results when available. Will adjust medications based on abnormalities seen.     He is doing some better at this time but is not back to baseline. He is needing medications refilled and will send these today.     Keep upcoming appointments with Nephrology and Cardiology. Call with any concerns with these appointments.     Will call with any new or worsening symptoms.     Follow Up   No follow-ups on file.  Patient was given instructions and counseling regarding his condition or for health maintenance advice. Please see specific information pulled into the AVS if appropriate.     Divina Delgado, DO

## 2024-06-28 NOTE — TELEPHONE ENCOUNTER
[facility] CALLED STATING THE PATIENT DECLINED HOME HEALTH OCCUPATIONAL THERAPY. JUST AN FYI

## 2024-06-30 LAB
ALBUMIN SERPL-MCNC: 4.3 G/DL (ref 3.8–4.8)
ALP SERPL-CCNC: 77 IU/L (ref 44–121)
ALT SERPL-CCNC: 5 IU/L (ref 0–44)
AST SERPL-CCNC: 10 IU/L (ref 0–40)
BASOPHILS # BLD AUTO: 0 X10E3/UL (ref 0–0.2)
BASOPHILS NFR BLD AUTO: 1 %
BILIRUB SERPL-MCNC: <0.2 MG/DL (ref 0–1.2)
BUN SERPL-MCNC: 77 MG/DL (ref 8–27)
BUN/CREAT SERPL: 30 (ref 10–24)
CALCIUM SERPL-MCNC: 9.2 MG/DL (ref 8.6–10.2)
CHLORIDE SERPL-SCNC: 105 MMOL/L (ref 96–106)
CHOLEST SERPL-MCNC: 111 MG/DL (ref 100–199)
CO2 SERPL-SCNC: 23 MMOL/L (ref 20–29)
CREAT SERPL-MCNC: 2.56 MG/DL (ref 0.76–1.27)
EGFRCR SERPLBLD CKD-EPI 2021: 25 ML/MIN/1.73
EOSINOPHIL # BLD AUTO: 0.1 X10E3/UL (ref 0–0.4)
EOSINOPHIL NFR BLD AUTO: 3 %
ERYTHROCYTE [DISTWIDTH] IN BLOOD BY AUTOMATED COUNT: 14.5 % (ref 11.6–15.4)
FERRITIN SERPL-MCNC: 75 NG/ML (ref 30–400)
GLOBULIN SER CALC-MCNC: 2.8 G/DL (ref 1.5–4.5)
GLUCOSE SERPL-MCNC: 94 MG/DL (ref 70–99)
HCT VFR BLD AUTO: 27.1 % (ref 37.5–51)
HDLC SERPL-MCNC: 33 MG/DL
HGB BLD-MCNC: 8.4 G/DL (ref 13–17.7)
IMM GRANULOCYTES # BLD AUTO: 0 X10E3/UL (ref 0–0.1)
IMM GRANULOCYTES NFR BLD AUTO: 1 %
IRON SATN MFR SERPL: 17 % (ref 15–55)
IRON SERPL-MCNC: 79 UG/DL (ref 38–169)
LDLC SERPL CALC-MCNC: 60 MG/DL (ref 0–99)
LYMPHOCYTES # BLD AUTO: 1.2 X10E3/UL (ref 0.7–3.1)
LYMPHOCYTES NFR BLD AUTO: 27 %
MAGNESIUM SERPL-MCNC: 2.4 MG/DL (ref 1.6–2.3)
MCH RBC QN AUTO: 32.7 PG (ref 26.6–33)
MCHC RBC AUTO-ENTMCNC: 31 G/DL (ref 31.5–35.7)
MCV RBC AUTO: 105 FL (ref 79–97)
MONOCYTES # BLD AUTO: 0.6 X10E3/UL (ref 0.1–0.9)
MONOCYTES NFR BLD AUTO: 14 %
NEUTROPHILS # BLD AUTO: 2.3 X10E3/UL (ref 1.4–7)
NEUTROPHILS NFR BLD AUTO: 54 %
PLATELET # BLD AUTO: 219 X10E3/UL (ref 150–450)
POTASSIUM SERPL-SCNC: 4.3 MMOL/L (ref 3.5–5.2)
PROT SERPL-MCNC: 7.1 G/DL (ref 6–8.5)
RBC # BLD AUTO: 2.57 X10E6/UL (ref 4.14–5.8)
SODIUM SERPL-SCNC: 147 MMOL/L (ref 134–144)
TIBC SERPL-MCNC: 472 UG/DL (ref 250–450)
TRIGL SERPL-MCNC: 96 MG/DL (ref 0–149)
UIBC SERPL-MCNC: 393 UG/DL (ref 111–343)
VLDLC SERPL CALC-MCNC: 18 MG/DL (ref 5–40)
WBC # BLD AUTO: 4.3 X10E3/UL (ref 3.4–10.8)

## 2024-07-09 ENCOUNTER — TELEPHONE (OUTPATIENT)
Dept: FAMILY MEDICINE CLINIC | Facility: CLINIC | Age: 79
End: 2024-07-09

## 2024-07-09 NOTE — TELEPHONE ENCOUNTER
"Relay     \"CALLED PT TO SEE IF HE WOULD STILL LIKE TO SEE DR MCKENNA FOR THE HEARING LOSS. DR MCKENNA'S OFFICE HAS ATTEMPTED TO CONTACT HIM BUT HE HAS NOT CALLED THEM BACK TO SCHEDULE. IF HE WOULD LIKE TO SCHEDULE HE MAY CALL THEM [phone]\"                "

## 2024-07-10 RX ORDER — TRIAMCINOLONE ACETONIDE 1 MG/G
1 OINTMENT TOPICAL 2 TIMES DAILY
Qty: 80 G | Refills: 1 | Status: SHIPPED | OUTPATIENT
Start: 2024-07-10

## 2024-07-12 ENCOUNTER — TELEPHONE (OUTPATIENT)
Dept: FAMILY MEDICINE CLINIC | Facility: CLINIC | Age: 79
End: 2024-07-12
Payer: MEDICARE

## 2024-07-12 NOTE — TELEPHONE ENCOUNTER
Faxed  home health and plan of care from [facility] that was originally faxed 07/05/2024.

## 2024-07-15 ENCOUNTER — TELEPHONE (OUTPATIENT)
Dept: FAMILY MEDICINE CLINIC | Facility: CLINIC | Age: 79
End: 2024-07-15
Payer: MEDICARE

## 2024-07-15 NOTE — TELEPHONE ENCOUNTER
JARROD WITH [facility] IS REQUESTING VERBAL ORDERS TO DISCHARGE PT FROM HOME HEALTH DUE TO PT REFUSING SERVICES.   CALL BACK [phone]

## 2024-07-15 NOTE — TELEPHONE ENCOUNTER
Can we make sure that the patient and his wife understand that if they keep refusing services that they will be discharged. If they are aware of this and would like to dc. OK to give verbal order.

## 2024-07-17 ENCOUNTER — OFFICE VISIT (OUTPATIENT)
Dept: FAMILY MEDICINE CLINIC | Facility: CLINIC | Age: 79
End: 2024-07-17
Payer: MEDICARE

## 2024-07-17 VITALS
OXYGEN SATURATION: 95 % | DIASTOLIC BLOOD PRESSURE: 50 MMHG | BODY MASS INDEX: 34.03 KG/M2 | HEART RATE: 62 BPM | HEIGHT: 71 IN | WEIGHT: 243.1 LBS | SYSTOLIC BLOOD PRESSURE: 140 MMHG

## 2024-07-17 DIAGNOSIS — L25.9 CONTACT DERMATITIS, UNSPECIFIED CONTACT DERMATITIS TYPE, UNSPECIFIED TRIGGER: Primary | ICD-10-CM

## 2024-07-17 PROCEDURE — 3078F DIAST BP <80 MM HG: CPT | Performed by: STUDENT IN AN ORGANIZED HEALTH CARE EDUCATION/TRAINING PROGRAM

## 2024-07-17 PROCEDURE — 99213 OFFICE O/P EST LOW 20 MIN: CPT | Performed by: STUDENT IN AN ORGANIZED HEALTH CARE EDUCATION/TRAINING PROGRAM

## 2024-07-17 PROCEDURE — 96372 THER/PROPH/DIAG INJ SC/IM: CPT | Performed by: STUDENT IN AN ORGANIZED HEALTH CARE EDUCATION/TRAINING PROGRAM

## 2024-07-17 PROCEDURE — 3077F SYST BP >= 140 MM HG: CPT | Performed by: STUDENT IN AN ORGANIZED HEALTH CARE EDUCATION/TRAINING PROGRAM

## 2024-07-17 PROCEDURE — 1126F AMNT PAIN NOTED NONE PRSNT: CPT | Performed by: STUDENT IN AN ORGANIZED HEALTH CARE EDUCATION/TRAINING PROGRAM

## 2024-07-17 RX ORDER — TRIAMCINOLONE ACETONIDE 40 MG/ML
40 INJECTION, SUSPENSION INTRA-ARTICULAR; INTRAMUSCULAR ONCE
Status: COMPLETED | OUTPATIENT
Start: 2024-07-17 | End: 2024-07-17

## 2024-07-17 RX ORDER — PREDNISONE 10 MG/1
40 TABLET ORAL DAILY
Qty: 20 TABLET | Refills: 0 | Status: SHIPPED | OUTPATIENT
Start: 2024-07-17 | End: 2024-07-22

## 2024-07-17 RX ADMIN — TRIAMCINOLONE ACETONIDE 40 MG: 40 INJECTION, SUSPENSION INTRA-ARTICULAR; INTRAMUSCULAR at 12:50

## 2024-07-17 NOTE — PROGRESS NOTES
"Chief Complaint  Rash (Red bumps on back and arms, itchy and painful. Had for about a week)    Stefan Nunez presents to [facility]  History of Present Illness    Patient presents the office today for evaluation of a rash that has across the shoulders and on his arms bilaterally.  Patient states that he has been putting on triamcinolone and while this does give temporary relief it does not resolve the symptoms or the rash.  He has been exposures, and while he has been on new medications, he can with trials of holding his medications he has not had any change in his symptoms.        Objective   Vital Signs:   /50   Pulse 62   Ht 180.3 cm (71\")   Wt 110 kg (243 lb 1.6 oz)   SpO2 95%   BMI 33.91 kg/m²     Body mass index is 33.91 kg/m².    Review of Systems    Past History:  Medical History: has a past medical history of Acute otitis externa, Acute otitis media, Atypical chest pain, Cellulitis, Diabetes mellitus type 1, Dysuria, Esophagitis, Gastritis, Gastroenteritis, Glucosuria, Hyperlipidemia, Hypertension, Hypothyroidism, Insulin dependent diabetes mellitus type IA, Nicotine dependence, Obesity, Penetrating foreign body of skin of index finger, initial encounter, Peripheral vascular disease, and Sinusitis.   Surgical History: has a past surgical history that includes Leg Biopsy (02/01/2009).   Family History: family history includes Developmental delay (age of onset: 74) in his mother; Diabetes (age of onset: 90) in his father; Hypertension (age of onset: 90) in his father.   Social History: reports that he quit smoking about 2 years ago. His smoking use included cigarettes. He started smoking about 48 years ago. He has a 46 pack-year smoking history. He has been exposed to tobacco smoke. He has never used smokeless tobacco. He reports that he does not currently use alcohol. Drug use questions deferred to the physician.      Current Outpatient " Medications:     albuterol sulfate  (90 Base) MCG/ACT inhaler, Inhale 1 puff Every 6 (Six) Hours As Needed., Disp: , Rfl:     allopurinol (ZYLOPRIM) 100 MG tablet, TAKE 1 TABLET BY MOUTH DAILY, Disp: 90 tablet, Rfl: 1    amLODIPine (NORVASC) 10 MG tablet, TAKE 1 TABLET BY MOUTH DAILY, Disp: 90 tablet, Rfl: 1    bumetanide (BUMEX) 2 MG tablet, Take 1 tablet by mouth Daily., Disp: 90 tablet, Rfl: 1    carvedilol (COREG) 25 MG tablet, TAKE 1 TABLET BY MOUTH TWICE A DAY WITH A MEAL, Disp: 180 tablet, Rfl: 1    citalopram (CeleXA) 20 MG tablet, TAKE ONE TABLET BY MOUTH DAILY, Disp: 90 tablet, Rfl: 1    clopidogrel (PLAVIX) 75 MG tablet, TAKE ONE TABLET BY MOUTH DAILY, Disp: 90 tablet, Rfl: 1    Continuous Glucose Sensor (Dexcom G7 Sensor) misc, Use 1 Device Every 10 (Ten) Days., Disp: 9 each, Rfl: 3    dorzolamide-timolol (COSOPT) 22.3-6.8 MG/ML ophthalmic solution, , Disp: , Rfl:     ferrous sulfate 324 (65 Fe) MG tablet delayed-release EC tablet, Take 1 tablet by mouth Daily With Breakfast., Disp: 90 tablet, Rfl: 1    gemfibrozil (LOPID) 600 MG tablet, Take 1 tablet by mouth 2 (Two) Times a Day., Disp: 180 tablet, Rfl: 1    hydrALAZINE (APRESOLINE) 100 MG tablet, Take 1 tablet by mouth 3 (Three) Times a Day., Disp: 270 tablet, Rfl: 1    insulin aspart (NovoLOG FlexPen) 100 UNIT/ML solution pen-injector sc pen, Inject 18u with large meals and 8u with small meals; if BG > 200mg/dl add 4u; max daily dose 50u, Disp: 15 mL, Rfl: 11    Insulin Degludec (Tresiba FlexTouch) 200 UNIT/ML solution pen-injector pen injection, Inject 24u twice daily, titrate for max daily dose 50u, Disp: 15 mL, Rfl: 11    Insulin Pen Needle (B-D ULTRAFINE III SHORT PEN) 31G X 8 MM misc, Use as directed up to 6 times per day, Disp: 200 each, Rfl: 11    isosorbide mononitrate (IMDUR) 120 MG 24 hr tablet, , Disp: , Rfl:     latanoprost (XALATAN) 0.005 % ophthalmic solution, , Disp: , Rfl:     neomycin-polymyxin-hydrocortisone (CORTISPORIN)  3.5-76592-8 otic solution, Administer 3 drops to the right ear 4 (Four) Times a Day., Disp: 10 mL, Rfl: 0    pantoprazole (PROTONIX) 40 MG EC tablet, Take 1 tablet by mouth 2 (Two) Times a Day., Disp: 180 tablet, Rfl: 1    rosuvastatin (CRESTOR) 20 MG tablet, Take 1 tablet by mouth Daily., Disp: 90 tablet, Rfl: 1    triamcinolone (KENALOG) 0.1 % ointment, Apply 1 Application topically to the appropriate area as directed 2 (Two) Times a Day., Disp: 80 g, Rfl: 1    empagliflozin (JARDIANCE) 10 MG tablet tablet, Take 1 tablet by mouth Daily. (Patient not taking: Reported on 7/17/2024), Disp: 30 tablet, Rfl: 11    metOLazone (ZAROXOLYN) 2.5 MG tablet, Take 1 tablet by mouth 3 (Three) Times a Week. (Patient not taking: Reported on 7/17/2024), Disp: , Rfl:     predniSONE (DELTASONE) 10 MG tablet, Take 4 tablets by mouth Daily for 5 days., Disp: 20 tablet, Rfl: 0    Current Facility-Administered Medications:     triamcinolone acetonide (KENALOG-40) injection 40 mg, 40 mg, Intramuscular, Once, Divina Delgado DO    Allergies: Moxifloxacin, Niacin, Oxycodone hcl, and Oxycodone-acetaminophen    Physical Exam  Constitutional:       General: He is not in acute distress.     Appearance: He is not ill-appearing or toxic-appearing.   HENT:      Head: Normocephalic and atraumatic.   Pulmonary:      Effort: Pulmonary effort is normal. No respiratory distress.   Skin:     Comments: Erythematous, raised dermatitis on the shoulder blades bilaterally, crossing the midline proceeds down the backs of the arms to the elbows bilaterally.   Neurological:      General: No focal deficit present.      Mental Status: He is alert and oriented to person, place, and time.   Psychiatric:         Mood and Affect: Mood normal.         Thought Content: Thought content normal.          Result Review :                   Assessment and Plan    Diagnoses and all orders for this visit:    1. Contact dermatitis, unspecified contact dermatitis type,  unspecified trigger (Primary)  -     triamcinolone acetonide (KENALOG-40) injection 40 mg    Other orders  -     predniSONE (DELTASONE) 10 MG tablet; Take 4 tablets by mouth Daily for 5 days.  Dispense: 20 tablet; Refill: 0    Will do triamcinolone in the office today and do burst of prednisone.  Continue to use triamcinolone cream at home to help with symptoms.  Call with any new or worsening symptoms and will send to dermatology/allergy.    Follow Up   No follow-ups on file.  Patient was given instructions and counseling regarding his condition or for health maintenance advice. Please see specific information pulled into the AVS if appropriate.     Divina Delgado, DO

## 2024-07-18 ENCOUNTER — PATIENT OUTREACH (OUTPATIENT)
Dept: CASE MANAGEMENT | Facility: OTHER | Age: 79
End: 2024-07-18
Payer: MEDICARE

## 2024-07-18 DIAGNOSIS — I10 ESSENTIAL HYPERTENSION: ICD-10-CM

## 2024-07-18 DIAGNOSIS — E11.22 TYPE 2 DIABETES MELLITUS WITH STAGE 4 CHRONIC KIDNEY DISEASE, WITH LONG-TERM CURRENT USE OF INSULIN: Primary | ICD-10-CM

## 2024-07-18 DIAGNOSIS — Z79.4 TYPE 2 DIABETES MELLITUS WITH STAGE 4 CHRONIC KIDNEY DISEASE, WITH LONG-TERM CURRENT USE OF INSULIN: Primary | ICD-10-CM

## 2024-07-18 DIAGNOSIS — N18.4 TYPE 2 DIABETES MELLITUS WITH STAGE 4 CHRONIC KIDNEY DISEASE, WITH LONG-TERM CURRENT USE OF INSULIN: Primary | ICD-10-CM

## 2024-07-18 NOTE — OUTREACH NOTE
AMBULATORY CASE MANAGEMENT NOTE    Names and Relationships of Patient/Support Persons: Contact: Elzbieta Nunez; Relationship: Self -     [facility] Interim Update    Spoke to patient's wife regarding care. She reports patient is doing well. He has struggled with rash on shoulders and arms. She states the triamcinolone acetonide injection has improved rash significantly. He is no longer itching and redness has decreased.    Discussed CHF management. Patient wife states patient's bp remains in normal range around 120's to 130's systolic. She states his most recent BP was 129/65. Patient has minimal swelling in legs. Patient has upcoming appointment with cardiology on 7/26. He has also attended nephrology appointment on 6/27/24 and he has follow-up in August.     Wife states patient went to ENT appointment. She states provider cleaned ears which has helped patient hear better at this time. No additional needs. Will place patient monitoring at this time.        Education Documentation  No documentation found.        Lily WADSWORTH  Ambulatory Case Management    7/18/2024, 11:33 EDT

## 2024-08-17 ENCOUNTER — PATIENT OUTREACH (OUTPATIENT)
Dept: CASE MANAGEMENT | Facility: OTHER | Age: 79
End: 2024-08-17
Payer: MEDICARE

## 2024-08-17 DIAGNOSIS — N18.4 TYPE 2 DIABETES MELLITUS WITH STAGE 4 CHRONIC KIDNEY DISEASE, WITH LONG-TERM CURRENT USE OF INSULIN: Primary | ICD-10-CM

## 2024-08-17 DIAGNOSIS — E11.22 TYPE 2 DIABETES MELLITUS WITH STAGE 4 CHRONIC KIDNEY DISEASE, WITH LONG-TERM CURRENT USE OF INSULIN: Primary | ICD-10-CM

## 2024-08-17 DIAGNOSIS — I10 ESSENTIAL HYPERTENSION: ICD-10-CM

## 2024-08-17 DIAGNOSIS — Z79.4 TYPE 2 DIABETES MELLITUS WITH STAGE 4 CHRONIC KIDNEY DISEASE, WITH LONG-TERM CURRENT USE OF INSULIN: Primary | ICD-10-CM

## 2024-08-17 NOTE — OUTREACH NOTE
AMBULATORY CASE MANAGEMENT NOTE    Names and Relationships of Patient/Support Persons: Contact: Jessica Nunez; Relationship: Spouse -     [facility] Interim Update    Spoke to patient's wife regarding recent ER visit. She states patient is doing well. He has not experienced any chest pain since incident. He has followed up with cardiologist since ER visit. Patient still has some swelling in legs bilaterally. He continues to elevate legs when resting. BP remains in normal range. Advised patient to contact cardiologist if swelling worsens. No additional needs. Will keep patient in monitoring.       Education Documentation  No documentation found.        Lily WADSWORTH  Ambulatory Case Management    8/17/2024, 09:25 EDT

## 2024-08-19 DIAGNOSIS — I10 PRIMARY HYPERTENSION: ICD-10-CM

## 2024-08-19 RX ORDER — AMLODIPINE BESYLATE 10 MG/1
10 TABLET ORAL DAILY
Qty: 90 TABLET | Refills: 1 | Status: SHIPPED | OUTPATIENT
Start: 2024-08-19

## 2024-08-21 NOTE — PROGRESS NOTES
Chief complaint/Reason for consult: T2DM    HPI: Mr. Nunez is a 79yoM with T2DM, hypertension, hyperlipidemia, PVD and GERD who comes for follow-up of T2DM. Since lat visit on 4/25/2024 he was hospitalized for heart failure exacerbation and his Jardiance was stopped briefly due to rising creatinine but now back taking it. Following with dermatology for new rash on his back thought to be a drug rash from metolazone. Has higher BG the last day due to cortisone shot in his shoulder.     # [id number], uncontrolled due to hyperglycemia with complications  # CKD IV  # CAD with heart failure   # Diabetic retinopathy, resolved   # Diabetic polyneuropathy  - Diagnosed: ~1995 years ago   - Current regimen includes: Jardiance 10mg daily, Tresiba (U-200) 20u in the morning and 20u at night, NovoLog 14 units with meals   - Stopped Ozempic due to cost   - Had some GI issues with Ozempic when he was taking it, not interested in resuming it  - Stopped SGLT2 inhibitor briefly due rise in kidney function but now back on it per nephrology   - Compliance with medication is improving    - HbA1c: 7.1% today (unreliable due to anemia); in office blood glucose 189 mg/dL  - Using Dexcom G7     CGM Download  -Dates reviewed: 8/9/2024-8/22/2024  -Data: 93% wear time, average glucose 201 mg/dL, standard deviation 53 mg/dL, GMI 8.1%, 18% very high, 47% high, 35% time in range, 0% low and 0% very low  -Interpretation: Borderline nocturnal hyperglycemia with little variability and daytime hyperglycemia worse after meals with more variability     - Takes prednisone for gout occasionally and recently for a rash on his back and also got steroid injection in his shoulder leading to hyperglycemia  - Hypoglycemia occurs rarely, typically if he takes too much insulin   - Patient has symptoms with lows   - Hyperglycemia occurs with steroids, missed bolus insulin and poor diet choices   - Patient reports neuropathy that is stable in hands and feet   -  "Patient denies gastroparesis   - Patient reports rotating injection sites, uses abdomen  - Patient with known ASCVD   - intolerant to ACEi/ARB due to CKD with hyperkalemia; blood pressure today 140/58     DM Health Maintenance:  Ophtho: 4/4/2024 showing primary open-angle glaucoma, no diabetic retinopathy seen, age-related cataract of the right eye and posterior vitreous detachment  Monofilament / Foot exam: Completed 3/1/2023  Lipids/Statin: taking rosuvastatin 20mg daily, managed by PCP  AGBRIEL: not indicated as he is being followed for CKD IV by nephrology  TSH: 2.200 done 10/20/2023   Aspirin: not taking as he takes plavix    Past medical history, past surgical history, family history and social history reviewed within this encounter.     Review of Systems   Constitutional:  Positive for activity change. Negative for unexpected weight change.   HENT:  Negative for trouble swallowing and voice change.    Respiratory:  Positive for shortness of breath.    Gastrointestinal:  Negative for abdominal pain.   Endocrine: Negative for cold intolerance and heat intolerance.   Genitourinary:  Negative for dysuria.   Musculoskeletal:  Positive for arthralgias.   Skin:  Positive for rash.   Neurological:  Positive for numbness.   Psychiatric/Behavioral:  Negative for agitation and confusion.         /58   Pulse 64   Ht 180.3 cm (70.98\")   Wt 111 kg (244 lb)   BMI 34.05 kg/m²      Physical Exam  Vitals reviewed.   Constitutional:       General: He is not in acute distress.     Appearance: He is obese.   HENT:      Head: Normocephalic.      Nose: Nose normal.   Eyes:      Conjunctiva/sclera: Conjunctivae normal.   Cardiovascular:      Rate and Rhythm: Normal rate.      Pulses:           Dorsalis pedis pulses are 2+ on the right side and 2+ on the left side.   Pulmonary:      Comments: Slightly increased work of breathing  Abdominal:      Tenderness: There is no guarding.   Musculoskeletal:      Right lower leg: Edema " present.      Left lower leg: Edema present.      Right foot: No deformity or bunion.      Left foot: No deformity or bunion.      Comments: Pitting edema up to knees   Feet:      Right foot:      Protective Sensation: 8 sites tested.  7 sites sensed.      Skin integrity: Callus and dry skin present.      Toenail Condition: Right toenails are abnormally thick. Fungal disease present.     Left foot:      Protective Sensation: 8 sites tested.  6 sites sensed.      Skin integrity: Callus and dry skin present.      Toenail Condition: Left toenails are abnormally thick. Fungal disease present.     Comments: Diminished sensation to vibration bilaterally, decreased sensation to monofilament on distal aspect of bilateral feet, preulcerative callus on bilateral heels  Skin:     Comments: Scattered bruising and scabs on exposed skin   Neurological:      Mental Status: He is alert and oriented to person, place, and time.   Psychiatric:         Mood and Affect: Mood normal.         Behavior: Behavior normal.          Labs and images reviewed as noted in the HPI    Assessment and plan:    Diagnoses and all orders for this visit:    1. Type 2 diabetes mellitus with stage 4 chronic kidney disease, with long-term current use of insulin (Primary)  Assessment & Plan:  -Diabetes remains uncontrolled due to hyperglycemia but is improving  -Recently with more highs due to steroids  -Complications include known diabetic nephropathy with CKD IV, CAD with heart failure and diabetic polyneuropathy  -Patient previously with diabetic retinopathy that has subsequently resolved  -Patient remains high risk for complications due to persistent hyperglycemia; counseled that long term hyperglycemia can cause worsening neuropathy, kidney damage, eye damage and cardiovascular disease  -Due to cost of medication and some side effects he stopped Ozempic and is not interested in resuming GLP-1 agonist  -Continue Jardiance 10 mg daily per  nephrology  -Recommend Tresiba 20 units twice daily  -Recommend NovoLog 16 units with meals / 8 units with small meals or snacks and no correction factor as it was too confusing for the patient   -Continue Dexcom G7  -Intolerant to ACEi/ARB due to CKD with hyperkalemia; blood pressure today 118/60     DM Health Maintenance:  Ophtho: 4/4/2024 showing primary open-angle glaucoma, no diabetic retinopathy seen, age-related cataract of the right eye and posterior vitreous detachment  Monofilament / Foot exam: Completed today, abnormal  Lipids/Statin: taking rosuvastatin 20mg daily, managed by PCP  GABRIEL: not indicated as he is being followed for CKD IV by nephrology, recommend he call their office because he has significant peripheral edema today  TSH: 2.200 done 10/20/2023   Aspirin: not taking as he takes plavix    Orders:  -     POC Glucose, Blood  -     POC Glycosylated Hemoglobin (Hb A1C)  -     Continuous Glucose Sensor (Dexcom G7 Sensor) misc; Use 1 Device Every 10 (Ten) Days.  Dispense: 9 each; Refill: 3  -     insulin aspart (NovoLOG FlexPen) 100 UNIT/ML solution pen-injector sc pen; Inject 16u with meals and 8u with small meals/snacks; max daily dose 50u  Dispense: 15 mL; Refill: 11  -     Insulin Degludec (Tresiba FlexTouch) 200 UNIT/ML solution pen-injector pen injection; Inject 20u twice daily, titrate for max daily dose 50u  Dispense: 15 mL; Refill: 11  -     Insulin Pen Needle (B-D ULTRAFINE III SHORT PEN) 31G X 8 MM misc; Use as directed up to 6 times per day  Dispense: 200 each; Refill: 11         Return in about 3 months (around 11/22/2024) for T2DM.       Please note that portions of this note may have been completed with a voice recognition program. Efforts were made to edit the dictations, but occasionally words are mistranscribed.     Electronically signed by: Kyle S Rosenstein, MD

## 2024-08-22 ENCOUNTER — OFFICE VISIT (OUTPATIENT)
Dept: ENDOCRINOLOGY | Facility: CLINIC | Age: 79
End: 2024-08-22
Payer: MEDICARE

## 2024-08-22 VITALS
BODY MASS INDEX: 34.16 KG/M2 | DIASTOLIC BLOOD PRESSURE: 58 MMHG | HEIGHT: 71 IN | HEART RATE: 64 BPM | WEIGHT: 244 LBS | SYSTOLIC BLOOD PRESSURE: 140 MMHG

## 2024-08-22 DIAGNOSIS — E11.22 TYPE 2 DIABETES MELLITUS WITH STAGE 4 CHRONIC KIDNEY DISEASE, WITH LONG-TERM CURRENT USE OF INSULIN: Primary | ICD-10-CM

## 2024-08-22 DIAGNOSIS — N18.4 TYPE 2 DIABETES MELLITUS WITH STAGE 4 CHRONIC KIDNEY DISEASE, WITH LONG-TERM CURRENT USE OF INSULIN: Primary | ICD-10-CM

## 2024-08-22 DIAGNOSIS — Z79.4 TYPE 2 DIABETES MELLITUS WITH STAGE 4 CHRONIC KIDNEY DISEASE, WITH LONG-TERM CURRENT USE OF INSULIN: Primary | ICD-10-CM

## 2024-08-22 LAB
EXPIRATION DATE: ABNORMAL
EXPIRATION DATE: ABNORMAL
GLUCOSE BLDC GLUCOMTR-MCNC: 189 MG/DL (ref 70–130)
HBA1C MFR BLD: 7.1 % (ref 4.5–5.7)
Lab: ABNORMAL
Lab: ABNORMAL

## 2024-08-22 RX ORDER — ACYCLOVIR 400 MG/1
1 TABLET ORAL
Qty: 9 EACH | Refills: 3 | Status: SHIPPED | OUTPATIENT
Start: 2024-08-22

## 2024-08-22 RX ORDER — INSULIN ASPART 100 [IU]/ML
INJECTION, SOLUTION INTRAVENOUS; SUBCUTANEOUS
Qty: 15 ML | Refills: 11 | Status: SHIPPED | OUTPATIENT
Start: 2024-08-22

## 2024-08-22 RX ORDER — INSULIN DEGLUDEC 200 U/ML
INJECTION, SOLUTION SUBCUTANEOUS
Qty: 15 ML | Refills: 11 | Status: SHIPPED | OUTPATIENT
Start: 2024-08-22

## 2024-08-22 RX ORDER — PEN NEEDLE, DIABETIC 31 GX5/16"
NEEDLE, DISPOSABLE MISCELLANEOUS
Qty: 200 EACH | Refills: 11 | Status: SHIPPED | OUTPATIENT
Start: 2024-08-22

## 2024-08-22 NOTE — ASSESSMENT & PLAN NOTE
-Diabetes remains uncontrolled due to hyperglycemia but is improving  -Recently with more highs due to steroids  -Complications include known diabetic nephropathy with CKD IV, CAD with heart failure and diabetic polyneuropathy  -Patient previously with diabetic retinopathy that has subsequently resolved  -Patient remains high risk for complications due to persistent hyperglycemia; counseled that long term hyperglycemia can cause worsening neuropathy, kidney damage, eye damage and cardiovascular disease  -Due to cost of medication and some side effects he stopped Ozempic and is not interested in resuming GLP-1 agonist  -Continue Jardiance 10 mg daily per nephrology  -Recommend Tresiba 20 units twice daily  -Recommend NovoLog 16 units with meals / 8 units with small meals or snacks and no correction factor as it was too confusing for the patient   -Continue Dexcom G7  -Intolerant to ACEi/ARB due to CKD with hyperkalemia; blood pressure today 118/60     DM Health Maintenance:  Ophtho: 4/4/2024 showing primary open-angle glaucoma, no diabetic retinopathy seen, age-related cataract of the right eye and posterior vitreous detachment  Monofilament / Foot exam: Completed today, abnormal  Lipids/Statin: taking rosuvastatin 20mg daily, managed by PCP  GABRIEL: not indicated as he is being followed for CKD IV by nephrology, recommend he call their office because he has significant peripheral edema today  TSH: 2.200 done 10/20/2023   Aspirin: not taking as he takes plavix

## 2024-10-17 ENCOUNTER — TELEPHONE (OUTPATIENT)
Dept: FAMILY MEDICINE CLINIC | Facility: CLINIC | Age: 79
End: 2024-10-17
Payer: MEDICARE

## 2024-10-17 NOTE — TELEPHONE ENCOUNTER
Caller: Elzbieta Nunez    Relationship to patient: Self    Best call back number: [phone]     New or established patient?  [] New  [x] Established    Date of discharge: 10/17/2024    Facility discharged from: [facility]     Diagnosis/Symptoms: SHORTNESS OF BREATH AND CHEST PAIN       PLEASE GET HOSPITAL RECORDS FOR PATIENT'S [facility] FOLLOW UP SCHEDULED FOR 10/23/2024

## 2024-10-21 RX ORDER — ALLOPURINOL 100 MG/1
100 TABLET ORAL DAILY
Qty: 90 TABLET | Refills: 1 | Status: SHIPPED | OUTPATIENT
Start: 2024-10-21

## 2024-10-21 RX ORDER — CARVEDILOL 25 MG/1
25 TABLET ORAL 2 TIMES DAILY WITH MEALS
Qty: 180 TABLET | Refills: 1 | Status: SHIPPED | OUTPATIENT
Start: 2024-10-21

## 2024-10-23 ENCOUNTER — OFFICE VISIT (OUTPATIENT)
Dept: FAMILY MEDICINE CLINIC | Facility: CLINIC | Age: 79
End: 2024-10-23
Payer: MEDICARE

## 2024-10-23 VITALS
BODY MASS INDEX: 33.46 KG/M2 | OXYGEN SATURATION: 95 % | HEART RATE: 57 BPM | SYSTOLIC BLOOD PRESSURE: 120 MMHG | DIASTOLIC BLOOD PRESSURE: 46 MMHG | WEIGHT: 239 LBS | HEIGHT: 71 IN

## 2024-10-23 DIAGNOSIS — N28.9 RENAL DISORDER: ICD-10-CM

## 2024-10-23 DIAGNOSIS — E11.29 TYPE 2 DIABETES MELLITUS WITH OTHER DIABETIC KIDNEY COMPLICATION, WITH LONG-TERM CURRENT USE OF INSULIN: ICD-10-CM

## 2024-10-23 DIAGNOSIS — Z79.4 TYPE 2 DIABETES MELLITUS WITH OTHER DIABETIC KIDNEY COMPLICATION, WITH LONG-TERM CURRENT USE OF INSULIN: ICD-10-CM

## 2024-10-23 DIAGNOSIS — I21.4 NSTEMI (NON-ST ELEVATED MYOCARDIAL INFARCTION): Primary | ICD-10-CM

## 2024-10-23 DIAGNOSIS — M54.2 MUSCLE PAIN, CERVICAL: ICD-10-CM

## 2024-10-23 RX ORDER — ATORVASTATIN CALCIUM 40 MG/1
TABLET, FILM COATED ORAL
COMMUNITY
Start: 2024-10-17

## 2024-10-23 RX ORDER — TRIAMCINOLONE ACETONIDE 40 MG/ML
40 INJECTION, SUSPENSION INTRA-ARTICULAR; INTRAMUSCULAR ONCE
Status: CANCELLED | OUTPATIENT
Start: 2024-10-23 | End: 2024-10-23

## 2024-10-23 RX ORDER — HYDRALAZINE HYDROCHLORIDE 50 MG/1
50 TABLET, FILM COATED ORAL 3 TIMES DAILY
COMMUNITY
Start: 2024-10-17

## 2024-10-23 RX ORDER — BUMETANIDE 2 MG/1
2 TABLET ORAL DAILY
Qty: 90 TABLET | Refills: 1 | Status: SHIPPED | OUTPATIENT
Start: 2024-10-23

## 2024-10-23 NOTE — PROGRESS NOTES
Transitional Care Follow Up Visit  Subjective   Patient has gained fluid weight (9lbs) and was told to take 2 pills Bumex. Appointment to see Nephrology 10/31/24.    Elzbieta Nunez is a 79 y.o. male who presents for a transitional care management visit.    Within 48 business hours after discharge our office contacted him via telephone to coordinate his care and needs.      I reviewed and discussed the details of that call along with the discharge summary, hospital problems, inpatient lab results, inpatient diagnostic studies, and consultation reports with Elzbieta.     Current outpatient and discharge medications have been reconciled for the patient.  Reviewed by: Divina Delgado DO          6/20/2024     5:20 PM   Date of TCM Phone Call   [facility]   Date of Admission 6/16/2024   Date of Discharge 6/20/2024     Risk for Readmission (LACE) No data recorded    History of Present Illness    Patient was admitted to [facility] for non-STEMI.  He was admitted on 10/5/2024 and discharged on 10/17/2024 patient presented to the emergency department originally with dyspnea and worsening chest pain.  He was followed by cardiology and was recommended outpatient evaluation as well as continuing dual antiplatelet therapy, statin, carvedilol, and Imdur.  He was also found to be fluid overloaded and in acute hypoxic respiratory failure.  He was increased on his diuretic and had improvement prior to discharge.  Patient also had an SUN on CKD and was seen by nephrology inpatient as well as advised to follow-up approximately 1 week after discharge.    Patient states that at this time he is feeling much better than he was when he was at the hospital, and other than having some persistent neck pain which has been present for some time he is not having any chest pain or difficulties with urination.    Patient states that he has had persistent neck pain and has not had this evaluated  "previously.  He is unable to take NSAIDs secondary to cardiovascular risk, and Tylenol is not helping consistently.    Objective   Vital Signs:   /46   Pulse 57   Ht 180.3 cm (71\")   Wt 108 kg (239 lb)   SpO2 95%   BMI 33.33 kg/m²     Body mass index is 33.33 kg/m².    Review of Systems   Constitutional: Negative.    Respiratory: Negative.     Cardiovascular: Negative.    Musculoskeletal: Negative.    Neurological: Negative.    Psychiatric/Behavioral: Negative.         Past History:  Medical History: has a past medical history of Acute otitis externa, Acute otitis media, Atypical chest pain, Cellulitis, CHF (congestive heart failure) (2022), Diabetes mellitus type 1, Dysuria, Esophagitis, Gastritis, Gastroenteritis, Glaucoma, Glucosuria, HL (hearing loss), Hyperlipidemia, Hypertension, Hypothyroidism, Insulin dependent diabetes mellitus type IA, Myocardial infarction, Nicotine dependence, Obesity, Penetrating foreign body of skin of index finger, initial encounter, Peripheral vascular disease, Renal insufficiency, and Sinusitis.   Surgical History: has a past surgical history that includes Leg Biopsy (02/01/2009); Appendectomy; and Cholecystectomy.   Family History: family history includes Developmental delay (age of onset: 74) in his mother; Diabetes (age of onset: 90) in his father; Hypertension (age of onset: 90) in his father.   Social History: reports that he quit smoking about 2 years ago. His smoking use included cigarettes. He started smoking about 48 years ago. He has a 46 pack-year smoking history. He has been exposed to tobacco smoke. He has never used smokeless tobacco. He reports that he does not drink alcohol and does not use drugs.      Current Outpatient Medications:     atorvastatin (LIPITOR) 40 MG tablet, , Disp: , Rfl:     bumetanide (BUMEX) 2 MG tablet, Take 1 tablet by mouth Daily., Disp: 90 tablet, Rfl: 1    hydrALAZINE (APRESOLINE) 50 MG tablet, Take 1 tablet by mouth 3 (Three) " Times a Day., Disp: , Rfl:     albuterol sulfate  (90 Base) MCG/ACT inhaler, Inhale 1 puff Every 6 (Six) Hours As Needed., Disp: , Rfl:     allopurinol (ZYLOPRIM) 100 MG tablet, TAKE 1 TABLET BY MOUTH DAILY, Disp: 90 tablet, Rfl: 1    amLODIPine (NORVASC) 10 MG tablet, TAKE 1 TABLET BY MOUTH DAILY, Disp: 90 tablet, Rfl: 1    carvedilol (COREG) 25 MG tablet, TAKE 1 TABLET BY MOUTH TWICE A DAY WITH A MEAL, Disp: 180 tablet, Rfl: 1    citalopram (CeleXA) 20 MG tablet, TAKE ONE TABLET BY MOUTH DAILY, Disp: 90 tablet, Rfl: 1    clopidogrel (PLAVIX) 75 MG tablet, TAKE ONE TABLET BY MOUTH DAILY, Disp: 90 tablet, Rfl: 1    Continuous Glucose Sensor (Dexcom G7 Sensor) misc, Use 1 Device Every 10 (Ten) Days., Disp: 9 each, Rfl: 3    Diclofenac Sodium (VOLTAREN) 1 % gel gel, Apply 4 g topically to the appropriate area as directed 4 (Four) Times a Day As Needed (neck pain)., Disp: 150 g, Rfl: 1    dorzolamide-timolol (COSOPT) 22.3-6.8 MG/ML ophthalmic solution, , Disp: , Rfl:     empagliflozin (JARDIANCE) 10 MG tablet tablet, Take 1 tablet by mouth Daily., Disp: 30 tablet, Rfl: 11    ferrous sulfate 324 (65 Fe) MG tablet delayed-release EC tablet, Take 1 tablet by mouth Daily With Breakfast., Disp: 90 tablet, Rfl: 1    gemfibrozil (LOPID) 600 MG tablet, Take 1 tablet by mouth 2 (Two) Times a Day., Disp: 180 tablet, Rfl: 1    insulin aspart (NovoLOG FlexPen) 100 UNIT/ML solution pen-injector sc pen, Inject 16u with meals and 8u with small meals/snacks; max daily dose 50u, Disp: 15 mL, Rfl: 11    Insulin Degludec (Tresiba FlexTouch) 200 UNIT/ML solution pen-injector pen injection, Inject 20u twice daily, titrate for max daily dose 50u, Disp: 15 mL, Rfl: 11    Insulin Pen Needle (B-D ULTRAFINE III SHORT PEN) 31G X 8 MM misc, Use as directed up to 6 times per day, Disp: 200 each, Rfl: 11    isosorbide mononitrate (IMDUR) 120 MG 24 hr tablet, , Disp: , Rfl:     latanoprost (XALATAN) 0.005 % ophthalmic solution, , Disp: , Rfl:      metOLazone (ZAROXOLYN) 2.5 MG tablet, Take 1 tablet by mouth 3 (Three) Times a Week. (Patient not taking: Reported on 7/17/2024), Disp: , Rfl:     pantoprazole (PROTONIX) 40 MG EC tablet, Take 1 tablet by mouth 2 (Two) Times a Day., Disp: 180 tablet, Rfl: 1    triamcinolone (KENALOG) 0.1 % ointment, Apply 1 Application topically to the appropriate area as directed 2 (Two) Times a Day., Disp: 80 g, Rfl: 1    Allergies: Moxifloxacin, Niacin, Oxycodone hcl, and Oxycodone-acetaminophen    Physical Exam  Constitutional:       General: He is not in acute distress.     Appearance: He is not ill-appearing or toxic-appearing.   HENT:      Head: Normocephalic and atraumatic.   Cardiovascular:      Rate and Rhythm: Normal rate and regular rhythm.      Heart sounds: No murmur heard.  Pulmonary:      Effort: Pulmonary effort is normal. No respiratory distress.   Musculoskeletal:      Right lower leg: No edema.      Left lower leg: No edema.   Neurological:      General: No focal deficit present.      Mental Status: He is alert and oriented to person, place, and time.   Psychiatric:         Mood and Affect: Mood normal.         Thought Content: Thought content normal.            Result Review :                   Assessment and Plan    Diagnoses and all orders for this visit:    1. NSTEMI (non-ST elevated myocardial infarction) (Primary)    2. Renal disorder  -     bumetanide (BUMEX) 2 MG tablet; Take 1 tablet by mouth Daily.  Dispense: 90 tablet; Refill: 1  -     Basic Metabolic Panel    3. Muscle pain, cervical  -     XR Spine Cervical 2 or 3 View    4. Type 2 diabetes mellitus with other diabetic kidney complication, with long-term current use of insulin    Other orders  -     Fluzone High-Dose 65+yrs ([id number])  -     Diclofenac Sodium (VOLTAREN) 1 % gel gel; Apply 4 g topically to the appropriate area as directed 4 (Four) Times a Day As Needed (neck pain).  Dispense: 150 g; Refill: 1    Will do x-ray of the cervical  spine as well as start diclofenac gel.  Advised not to take anti-inflammatories, but he can continue to use Tylenol.    Will continue Bumex and refill it at this time as well as do BMP to make sure that there is no acute abnormalities with both potassium or kidney function secondary to being on higher doses of Bumex.    Advised patient to follow-up with cardiology and nephrology as currently scheduled or sooner with any new or worsening symptoms.    Flu vaccine given in the office today.    Follow Up   No follow-ups on file.  Patient was given instructions and counseling regarding his condition or for health maintenance advice. Please see specific information pulled into the AVS if appropriate.     Divina Delgado, DO

## 2024-10-24 LAB
BUN SERPL-MCNC: 79 MG/DL (ref 8–27)
BUN/CREAT SERPL: 26 (ref 10–24)
CALCIUM SERPL-MCNC: 8.5 MG/DL (ref 8.6–10.2)
CHLORIDE SERPL-SCNC: 104 MMOL/L (ref 96–106)
CO2 SERPL-SCNC: 16 MMOL/L (ref 20–29)
CREAT SERPL-MCNC: 2.99 MG/DL (ref 0.76–1.27)
EGFRCR SERPLBLD CKD-EPI 2021: 21 ML/MIN/1.73
GLUCOSE SERPL-MCNC: 169 MG/DL (ref 70–99)
POTASSIUM SERPL-SCNC: 4.7 MMOL/L (ref 3.5–5.2)
SODIUM SERPL-SCNC: 140 MMOL/L (ref 134–144)

## 2025-01-09 ENCOUNTER — READMISSION MANAGEMENT (OUTPATIENT)
Dept: CALL CENTER | Facility: HOSPITAL | Age: 80
End: 2025-01-09
Payer: MEDICARE

## 2025-01-09 ENCOUNTER — TELEPHONE (OUTPATIENT)
Dept: FAMILY MEDICINE CLINIC | Facility: CLINIC | Age: 80
End: 2025-01-09

## 2025-01-09 NOTE — OUTREACH NOTE
Prep Survey      Flowsheet Row Responses   [facility] facility patient discharged from? Non-BH   Is LACE score < 7 ? Non- Discharge   Eligibility [facility]   Date of Discharge 01/09/25   Discharge Disposition Home or Self Care   Discharge diagnosis Renal Failure   Does the patient have one of the following disease processes/diagnoses(primary or secondary)? Other   Prep survey completed? Yes            Jane YOUSSEF - Registered Nurse

## 2025-01-09 NOTE — TELEPHONE ENCOUNTER
Caller: DEEPTHI LUGO    Relationship to patient: Provider    Best call back number: [phone]     New or established patient?  [] New  [x] Established    Date of discharge: 01.09.25    Facility discharged from:  [facility]     Diagnosis/Symptoms: RENAL FAILURE     Length of stay (If applicable):     Specialty Only: Did you see a [facility] provider?    [] Yes  [] No  If so, who?   Additional Details:

## 2025-01-10 ENCOUNTER — TRANSITIONAL CARE MANAGEMENT TELEPHONE ENCOUNTER (OUTPATIENT)
Dept: CALL CENTER | Facility: HOSPITAL | Age: 80
End: 2025-01-10
Payer: MEDICARE

## 2025-01-10 ENCOUNTER — TELEPHONE (OUTPATIENT)
Dept: FAMILY MEDICINE CLINIC | Facility: CLINIC | Age: 80
End: 2025-01-10
Payer: MEDICARE

## 2025-01-10 NOTE — OUTREACH NOTE
Call Center TCM Note      Flowsheet Row Responses   [facility] patient discharged from? Non-BH   Does the patient have one of the following disease processes/diagnoses(primary or secondary)? Other   TCM attempt successful? No   Unsuccessful attempts Attempt 1            Gregorio Sosa RN    1/10/2025, 14:18 EST

## 2025-01-10 NOTE — OUTREACH NOTE
Call Center TCM Note      Flowsheet Row Responses   [facility] patient discharged from? Non-  [[facility]]   Does the patient have one of the following disease processes/diagnoses(primary or secondary)? Other   TCM attempt successful? Yes   Call start time 1520   Call end time 1521   Discharge diagnosis Renal Failure   Person spoke with today (if not patient) and relationship wife   Meds reviewed with patient/caregiver? Yes   Is the patient having any side effects they believe may be caused by any medication additions or changes? No   Does the patient have all medications ordered at discharge? Yes   Is the patient taking all medications as directed (includes completed medication regime)? Yes   Comments Patient has a hospital followup scheduled on 1/15/2025 with Dr. Delgado   Does the patient have an appointment with their PCP within 7-14 days of discharge? Yes   Psychosocial issues? No   Did the patient receive a copy of their discharge instructions? Yes   Nursing interventions Reviewed instructions with patient   What is the patient's perception of their health status since discharge? Improving   Is the patient/caregiver able to teach back signs and symptoms related to disease process for when to call PCP? Yes   Is the patient/caregiver able to teach back signs and symptoms related to disease process for when to call 911? Yes   Is the patient/caregiver able to teach back the hierarchy of who to call/visit for symptoms/problems? PCP, Specialist, Home health nurse, Urgent Care, ED, 911 Yes   If the patient is a current smoker, are they able to teach back resources for cessation? Not a smoker   TCM call completed? Yes   Wrap up additional comments Wife reports patient is still weak but doing better.   Call end time 1521   Would this patient benefit from a Referral to Amb Social Work? No   Is the patient interested in additional calls from an ambulatory ? No            Gregorio Sosa,  RN    1/10/2025, 15:21 EST

## 2025-01-10 NOTE — TELEPHONE ENCOUNTER
Caller: MARVIN    Relationship: [facility]    Best call back number: [phone]      What was the call regarding: PATIENT WILL BE DISCHARGING FROM [facility] ON 01/13/25 AND [facility] WANTS TO CONFIRM IF THE PCP WILL FOLLOW HOME HEALTH FOR PT, OT, AND SPEECH THERAPY. PLEASE CALL BACK TO CONFIRM.

## 2025-01-13 ENCOUNTER — TELEPHONE (OUTPATIENT)
Dept: FAMILY MEDICINE CLINIC | Facility: CLINIC | Age: 80
End: 2025-01-13
Payer: MEDICARE

## 2025-01-13 ENCOUNTER — TRANSCRIBE ORDERS (OUTPATIENT)
Dept: LAB | Facility: HOSPITAL | Age: 80
End: 2025-01-13
Payer: MEDICARE

## 2025-01-13 ENCOUNTER — LAB (OUTPATIENT)
Dept: LAB | Facility: HOSPITAL | Age: 80
End: 2025-01-13
Payer: MEDICARE

## 2025-01-13 DIAGNOSIS — M10.9 GOUT, UNSPECIFIED CAUSE, UNSPECIFIED CHRONICITY, UNSPECIFIED SITE: ICD-10-CM

## 2025-01-13 DIAGNOSIS — N18.4 CHRONIC KIDNEY DISEASE, STAGE IV (SEVERE): ICD-10-CM

## 2025-01-13 DIAGNOSIS — N18.4 CHRONIC KIDNEY DISEASE, STAGE IV (SEVERE): Primary | ICD-10-CM

## 2025-01-13 LAB
BILIRUB UR QL STRIP: NEGATIVE
CLARITY UR: ABNORMAL
COLOR UR: YELLOW
CREAT UR-MCNC: 167.3 MG/DL
GLUCOSE UR STRIP-MCNC: ABNORMAL MG/DL
HGB UR QL STRIP.AUTO: NEGATIVE
KETONES UR QL STRIP: ABNORMAL
LEUKOCYTE ESTERASE UR QL STRIP.AUTO: ABNORMAL
NITRITE UR QL STRIP: NEGATIVE
PH UR STRIP.AUTO: 5.5 [PH] (ref 5–8)
PROT UR QL STRIP: ABNORMAL
SP GR UR STRIP: 1.02 (ref 1–1.03)
UROBILINOGEN UR QL STRIP: ABNORMAL

## 2025-01-13 PROCEDURE — 84156 ASSAY OF PROTEIN URINE: CPT

## 2025-01-13 PROCEDURE — 81001 URINALYSIS AUTO W/SCOPE: CPT | Performed by: INTERNAL MEDICINE

## 2025-01-13 PROCEDURE — 83970 ASSAY OF PARATHORMONE: CPT

## 2025-01-13 PROCEDURE — 84550 ASSAY OF BLOOD/URIC ACID: CPT

## 2025-01-13 PROCEDURE — 36415 COLL VENOUS BLD VENIPUNCTURE: CPT

## 2025-01-13 PROCEDURE — 82570 ASSAY OF URINE CREATININE: CPT

## 2025-01-13 PROCEDURE — 82306 VITAMIN D 25 HYDROXY: CPT

## 2025-01-13 PROCEDURE — 80069 RENAL FUNCTION PANEL: CPT

## 2025-01-13 NOTE — TELEPHONE ENCOUNTER
Hub staff attempted to follow warm transfer process and was unsuccessful     Caller: ANIBAL FROM [facility]    Relationship to patient: Caregiver (non-relative)    Best call back number: [phone]     Patient is needing: ANIBAL FROM [facility] CALLED, LETTING US KNOW NICOLE IS UNDER HOME HEALTH CARE NOW, HOME NURSING, OT, AND PT. SPEECH THERAPY ORDERED AS WELL. PLEASE REACH OUT TO ANIBAL WITH ANY QUESTIONS OR ANY INFO SHE WOULD NEED

## 2025-01-14 LAB
25(OH)D3 SERPL-MCNC: 12.5 NG/ML (ref 30–100)
ALBUMIN SERPL-MCNC: 3.5 G/DL (ref 3.5–5.2)
ANION GAP SERPL CALCULATED.3IONS-SCNC: 13 MMOL/L (ref 5–15)
BACTERIA UR QL AUTO: ABNORMAL /HPF
BUN SERPL-MCNC: 26 MG/DL (ref 8–23)
BUN/CREAT SERPL: 19.3 (ref 7–25)
CALCIUM SPEC-SCNC: 8.4 MG/DL (ref 8.6–10.5)
CHLORIDE SERPL-SCNC: 110 MMOL/L (ref 98–107)
CO2 SERPL-SCNC: 23 MMOL/L (ref 22–29)
CREAT SERPL-MCNC: 1.35 MG/DL (ref 0.76–1.27)
EGFRCR SERPLBLD CKD-EPI 2021: 53.7 ML/MIN/1.73
GLUCOSE SERPL-MCNC: 132 MG/DL (ref 65–99)
HYALINE CASTS UR QL AUTO: ABNORMAL /LPF
PHOSPHATE SERPL-MCNC: 3.5 MG/DL (ref 2.5–4.5)
POTASSIUM SERPL-SCNC: 3.8 MMOL/L (ref 3.5–5.2)
PROT ?TM UR-MCNC: 428.9 MG/DL
PTH-INTACT SERPL-MCNC: 99.9 PG/ML (ref 15–65)
RBC # UR STRIP: ABNORMAL /HPF
REF LAB TEST METHOD: ABNORMAL
SODIUM SERPL-SCNC: 146 MMOL/L (ref 136–145)
SQUAMOUS #/AREA URNS HPF: ABNORMAL /HPF
URATE SERPL-MCNC: 7.3 MG/DL (ref 3.4–7)
WBC # UR STRIP: ABNORMAL /HPF
YEAST URNS QL MICRO: ABNORMAL /HPF

## 2025-01-15 ENCOUNTER — OFFICE VISIT (OUTPATIENT)
Dept: FAMILY MEDICINE CLINIC | Facility: CLINIC | Age: 80
End: 2025-01-15
Payer: MEDICARE

## 2025-01-15 VITALS
SYSTOLIC BLOOD PRESSURE: 140 MMHG | HEART RATE: 90 BPM | WEIGHT: 220 LBS | BODY MASS INDEX: 30.8 KG/M2 | DIASTOLIC BLOOD PRESSURE: 70 MMHG | OXYGEN SATURATION: 98 % | HEIGHT: 71 IN

## 2025-01-15 DIAGNOSIS — Z95.1 S/P CABG (CORONARY ARTERY BYPASS GRAFT): Primary | ICD-10-CM

## 2025-01-15 DIAGNOSIS — Z79.4 TYPE 2 DIABETES MELLITUS WITH STAGE 4 CHRONIC KIDNEY DISEASE, WITH LONG-TERM CURRENT USE OF INSULIN: ICD-10-CM

## 2025-01-15 DIAGNOSIS — N18.4 TYPE 2 DIABETES MELLITUS WITH STAGE 4 CHRONIC KIDNEY DISEASE, WITH LONG-TERM CURRENT USE OF INSULIN: ICD-10-CM

## 2025-01-15 DIAGNOSIS — I50.43 ACUTE ON CHRONIC COMBINED SYSTOLIC (CONGESTIVE) AND DIASTOLIC (CONGESTIVE) HEART FAILURE: ICD-10-CM

## 2025-01-15 DIAGNOSIS — I73.9 PVD (PERIPHERAL VASCULAR DISEASE): ICD-10-CM

## 2025-01-15 DIAGNOSIS — E11.22 TYPE 2 DIABETES MELLITUS WITH STAGE 4 CHRONIC KIDNEY DISEASE, WITH LONG-TERM CURRENT USE OF INSULIN: ICD-10-CM

## 2025-01-15 PROCEDURE — 1126F AMNT PAIN NOTED NONE PRSNT: CPT | Performed by: STUDENT IN AN ORGANIZED HEALTH CARE EDUCATION/TRAINING PROGRAM

## 2025-01-15 PROCEDURE — 3077F SYST BP >= 140 MM HG: CPT | Performed by: STUDENT IN AN ORGANIZED HEALTH CARE EDUCATION/TRAINING PROGRAM

## 2025-01-15 PROCEDURE — 3078F DIAST BP <80 MM HG: CPT | Performed by: STUDENT IN AN ORGANIZED HEALTH CARE EDUCATION/TRAINING PROGRAM

## 2025-01-15 PROCEDURE — 99495 TRANSJ CARE MGMT MOD F2F 14D: CPT | Performed by: STUDENT IN AN ORGANIZED HEALTH CARE EDUCATION/TRAINING PROGRAM

## 2025-01-15 PROCEDURE — 1111F DSCHRG MED/CURRENT MED MERGE: CPT | Performed by: STUDENT IN AN ORGANIZED HEALTH CARE EDUCATION/TRAINING PROGRAM

## 2025-01-15 RX ORDER — IPRATROPIUM BROMIDE AND ALBUTEROL SULFATE 2.5; .5 MG/3ML; MG/3ML
3 SOLUTION RESPIRATORY (INHALATION) EVERY 4 HOURS PRN
COMMUNITY

## 2025-01-15 RX ORDER — ONDANSETRON 4 MG/1
4 TABLET, FILM COATED ORAL EVERY 8 HOURS PRN
COMMUNITY

## 2025-01-15 RX ORDER — OLANZAPINE 5 MG/1
TABLET ORAL
COMMUNITY
Start: 2025-01-08

## 2025-01-15 RX ORDER — AMLODIPINE BESYLATE 2.5 MG/1
2.5 TABLET ORAL DAILY
COMMUNITY

## 2025-01-15 RX ORDER — FOLIC ACID/VIT B COMPLEX AND C 0.8 MG
1 TABLET ORAL DAILY
COMMUNITY

## 2025-01-15 RX ORDER — ASPIRIN 81 MG/1
81 TABLET ORAL DAILY
COMMUNITY

## 2025-01-15 RX ORDER — ECHINACEA PURPUREA EXTRACT 125 MG
1 TABLET ORAL AS NEEDED
COMMUNITY

## 2025-01-15 RX ORDER — ISOSORBIDE DINITRATE 10 MG/1
10 TABLET ORAL 3 TIMES DAILY
COMMUNITY

## 2025-01-15 RX ORDER — ACETAMINOPHEN 325 MG/1
650 TABLET ORAL EVERY 6 HOURS PRN
COMMUNITY

## 2025-01-15 RX ORDER — LIDOCAINE 50 MG/G
1 PATCH TOPICAL EVERY 24 HOURS
COMMUNITY

## 2025-01-15 RX ORDER — GUAIFENESIN 600 MG/1
1200 TABLET, EXTENDED RELEASE ORAL 2 TIMES DAILY
COMMUNITY

## 2025-01-15 NOTE — PROGRESS NOTES
"Transitional Care Follow Up Visit  Subjective     Elzbieta Nunez is a 79 y.o. male who presents for a transitional care management visit.    Within 48 business hours after discharge our office contacted him via telephone to coordinate his care and needs.      I reviewed and discussed the details of that call along with the discharge summary, hospital problems, inpatient lab results, inpatient diagnostic studies, and consultation reports with Elzbieta.     Current outpatient and discharge medications have been reconciled for the patient.  Reviewed by: Divina Delgado DO          1/9/2025     4:43 PM   Date of TCM Phone Call   [facility]   Date of Discharge 1/9/2025   Discharge Disposition Home or Self Care     Risk for Readmission (LACE) No data recorded    History of Present Illness    Patient was admitted to [facility] on 12/3/2024 and discharged from [facility] rehab on 1/9/2025.  Patient was admitted to [facility] for RCA of 80% which was status post PCI which required open heart surgery.  Patient was found to have a hemoglobin of 6.7 as well as SUN requiring patient to have tunneled dialysis catheter placed and emergent dialysis done while admitted in the hospital.  Patient did develop postoperative Pseudomonas pneumonia and was placed on aspirin and statin for prophylaxis.  Patient was discharged from patient states that he has been doing well and has had appropriate follow-up with nephrology who is planning on him to have his tunneled dialysis catheter removed, and has upcoming appointment with cardiology for follow-up postop.  He states that he is feeling much better than he did originally, and while he still is very weak and tired he is noticing day-to-day improvements.        Objective   Vital Signs:   /70   Pulse 90   Ht 180.3 cm (71\")   Wt 99.8 kg (220 lb)   SpO2 98%   BMI 30.68 kg/m²     Body mass index is 30.68 kg/m².    Review of Systems    Past History:  Medical " History: has a past medical history of Acute otitis externa, Acute otitis media, Atypical chest pain, Cellulitis, CHF (congestive heart failure) (2022), Diabetes mellitus type 1, Dysuria, Esophagitis, Gastritis, Gastroenteritis, Glaucoma, Glucosuria, HL (hearing loss), Hyperlipidemia, Hypertension, Hypothyroidism, Insulin dependent diabetes mellitus type IA, Myocardial infarction, Nicotine dependence, Obesity, Penetrating foreign body of skin of index finger, initial encounter, Peripheral vascular disease, Renal insufficiency, and Sinusitis.   Surgical History: has a past surgical history that includes Leg Biopsy (02/01/2009); Appendectomy; and Cholecystectomy.   Family History: family history includes Developmental delay (age of onset: 74) in his mother; Diabetes (age of onset: 90) in his father; Hypertension (age of onset: 90) in his father.   Social History: reports that he quit smoking about 3 years ago. His smoking use included cigarettes. He started smoking about 49 years ago. He has a 46 pack-year smoking history. He has been exposed to tobacco smoke. He has never used smokeless tobacco. He reports that he does not drink alcohol and does not use drugs.      Current Outpatient Medications:     albuterol sulfate  (90 Base) MCG/ACT inhaler, Inhale 1 puff Every 6 (Six) Hours As Needed., Disp: , Rfl:     allopurinol (ZYLOPRIM) 100 MG tablet, TAKE 1 TABLET BY MOUTH DAILY, Disp: 90 tablet, Rfl: 1    aspirin 81 MG EC tablet, Take 1 tablet by mouth Daily., Disp: , Rfl:     bumetanide (BUMEX) 2 MG tablet, Take 1 tablet by mouth Daily., Disp: 90 tablet, Rfl: 1    citalopram (CeleXA) 20 MG tablet, TAKE ONE TABLET BY MOUTH DAILY, Disp: 90 tablet, Rfl: 1    Continuous Glucose Sensor (Dexcom G7 Sensor) misc, Use 1 Device Every 10 (Ten) Days., Disp: 9 each, Rfl: 3    Diclofenac Sodium (VOLTAREN) 1 % gel gel, Apply 4 g topically to the appropriate area as directed 4 (Four) Times a Day As Needed (neck pain)., Disp: 150  g, Rfl: 1    dorzolamide-timolol (COSOPT) 22.3-6.8 MG/ML ophthalmic solution, , Disp: , Rfl:     ferrous sulfate 324 (65 Fe) MG tablet delayed-release EC tablet, Take 1 tablet by mouth Daily With Breakfast., Disp: 90 tablet, Rfl: 1    insulin aspart (NovoLOG FlexPen) 100 UNIT/ML solution pen-injector sc pen, Inject 16u with meals and 8u with small meals/snacks; max daily dose 50u, Disp: 15 mL, Rfl: 11    Insulin Degludec (Tresiba FlexTouch) 200 UNIT/ML solution pen-injector pen injection, Inject 20u twice daily, titrate for max daily dose 50u, Disp: 15 mL, Rfl: 11    Insulin Pen Needle (B-D ULTRAFINE III SHORT PEN) 31G X 8 MM misc, Use as directed up to 6 times per day, Disp: 200 each, Rfl: 11    isosorbide mononitrate (IMDUR) 120 MG 24 hr tablet, , Disp: , Rfl:     latanoprost (XALATAN) 0.005 % ophthalmic solution, , Disp: , Rfl:     OLANZapine (zyPREXA) 5 MG tablet, , Disp: , Rfl:     pantoprazole (PROTONIX) 40 MG EC tablet, Take 1 tablet by mouth 2 (Two) Times a Day., Disp: 180 tablet, Rfl: 1    amLODIPine (NORVASC) 10 MG tablet, TAKE 1 TABLET BY MOUTH DAILY, Disp: 90 tablet, Rfl: 1    atorvastatin (LIPITOR) 40 MG tablet, , Disp: , Rfl:     carvedilol (COREG) 25 MG tablet, TAKE 1 TABLET BY MOUTH TWICE A DAY WITH A MEAL, Disp: 180 tablet, Rfl: 1    clopidogrel (PLAVIX) 75 MG tablet, TAKE ONE TABLET BY MOUTH DAILY, Disp: 90 tablet, Rfl: 1    empagliflozin (JARDIANCE) 10 MG tablet tablet, Take 1 tablet by mouth Daily., Disp: 30 tablet, Rfl: 11    gemfibrozil (LOPID) 600 MG tablet, Take 1 tablet by mouth 2 (Two) Times a Day., Disp: 180 tablet, Rfl: 1    hydrALAZINE (APRESOLINE) 50 MG tablet, Take 1 tablet by mouth 3 (Three) Times a Day., Disp: , Rfl:     metOLazone (ZAROXOLYN) 2.5 MG tablet, Take 1 tablet by mouth 3 (Three) Times a Week. (Patient not taking: Reported on 7/17/2024), Disp: , Rfl:     triamcinolone (KENALOG) 0.1 % ointment, Apply 1 Application topically to the appropriate area as directed 2 (Two) Times  a Day., Disp: 80 g, Rfl: 1    Allergies: Moxifloxacin, Niacin, Oxycodone hcl, and Oxycodone-acetaminophen    Physical Exam  Constitutional:       General: He is not in acute distress.     Appearance: He is not ill-appearing or toxic-appearing.   HENT:      Head: Normocephalic and atraumatic.   Cardiovascular:      Rate and Rhythm: Normal rate. Rhythm irregular.      Heart sounds: No murmur heard.  Pulmonary:      Effort: Pulmonary effort is normal. No respiratory distress.   Skin:     Comments: Tunneled dialysis catheter on the right upper chest, bandage in place CDI.  CABG incision overlying the sternum healing well without erythema, drainage, or dehiscence.   Neurological:      General: No focal deficit present.      Mental Status: He is alert and oriented to person, place, and time.   Psychiatric:         Mood and Affect: Mood normal.         Thought Content: Thought content normal.          Result Review :   The following data was reviewed by: Divina Delgado DO on 01/15/2025:  Common labs          8/22/2024    15:07 10/23/2024    12:17 1/13/2025    13:15   Common Labs   Glucose  169  132    BUN  79  26    Creatinine  2.99  1.35    Sodium  140  146    Potassium  4.7  3.8    Chloride  104  110    Calcium  8.5  8.4    Albumin   3.5    Hemoglobin A1C 7.1      Uric Acid   7.3      Data reviewed : Radiologic studies  , Cardiology studies  , Consultant notes  , and Recent hospitalization notes notes from both hospitalizations including [facility] and [facility] including consultation notes, admission and discharge summaries as well as blood work.  Admission and discharge summary from [facility] reviewed.  Home health admission reviewed             Assessment and Plan    Diagnoses and all orders for this visit:    1. S/P CABG (coronary artery bypass graft) (Primary)    2. Acute on chronic combined systolic (congestive) and diastolic (congestive) heart failure    3. PVD  (peripheral vascular disease)    4. Type 2 diabetes mellitus with stage 4 chronic kidney disease, with long-term current use of insulin        Patient overall doing considerably better since his discharge from the hospital.  He states that he does continue to use home health, and has been gaining his strength back.  He does not have a list of medications that he is currently on as he has had some changes since his most recent visit with the nephrologist.    Patient states that he has dropped from a full tablet to half tablet of allopurinol, but has had worsening gout symptoms.  Recommended discussing with nephrology if he can go back to the full tablet since his kidney function has improved.    Follow-up in 2 months or sooner with any new or worsening symptoms for discussion of health maintenance.    Follow Up   No follow-ups on file.  Patient was given instructions and counseling regarding his condition or for health maintenance advice. Please see specific information pulled into the AVS if appropriate.     Divina Delgado, DO

## 2025-01-21 ENCOUNTER — OUTSIDE FACILITY SERVICE (OUTPATIENT)
Dept: FAMILY MEDICINE CLINIC | Facility: CLINIC | Age: 80
End: 2025-01-21
Payer: MEDICARE

## 2025-01-21 PROCEDURE — G0180 MD CERTIFICATION HHA PATIENT: HCPCS | Performed by: STUDENT IN AN ORGANIZED HEALTH CARE EDUCATION/TRAINING PROGRAM

## 2025-02-03 ENCOUNTER — TRANSCRIBE ORDERS (OUTPATIENT)
Dept: LAB | Facility: HOSPITAL | Age: 80
End: 2025-02-03
Payer: MEDICARE

## 2025-02-03 ENCOUNTER — LAB (OUTPATIENT)
Dept: LAB | Facility: HOSPITAL | Age: 80
End: 2025-02-03
Payer: MEDICARE

## 2025-02-03 DIAGNOSIS — N18.1 CHRONIC KIDNEY DISEASE, STAGE I: ICD-10-CM

## 2025-02-03 DIAGNOSIS — M10.9 GOUT, UNSPECIFIED CAUSE, UNSPECIFIED CHRONICITY, UNSPECIFIED SITE: ICD-10-CM

## 2025-02-03 DIAGNOSIS — R31.9 HEMATURIA SYNDROME: ICD-10-CM

## 2025-02-03 DIAGNOSIS — N18.1 CHRONIC KIDNEY DISEASE, STAGE I: Primary | ICD-10-CM

## 2025-02-03 DIAGNOSIS — N18.4 CHRONIC KIDNEY DISEASE, STAGE IV (SEVERE): Primary | ICD-10-CM

## 2025-02-03 LAB
ALBUMIN SERPL-MCNC: 3.4 G/DL (ref 3.5–5.2)
ALBUMIN/GLOB SERPL: 0.9 G/DL
ALP SERPL-CCNC: 111 U/L (ref 39–117)
ALT SERPL W P-5'-P-CCNC: 11 U/L (ref 1–41)
ANION GAP SERPL CALCULATED.3IONS-SCNC: 9.8 MMOL/L (ref 5–15)
AST SERPL-CCNC: 19 U/L (ref 1–40)
BILIRUB SERPL-MCNC: 0.2 MG/DL (ref 0–1.2)
BUN SERPL-MCNC: 22 MG/DL (ref 8–23)
BUN/CREAT SERPL: 18.5 (ref 7–25)
CALCIUM SPEC-SCNC: 8.4 MG/DL (ref 8.6–10.5)
CHLORIDE SERPL-SCNC: 108 MMOL/L (ref 98–107)
CO2 SERPL-SCNC: 27.2 MMOL/L (ref 22–29)
CREAT SERPL-MCNC: 1.19 MG/DL (ref 0.76–1.27)
DEPRECATED RDW RBC AUTO: 65.2 FL (ref 37–54)
EGFRCR SERPLBLD CKD-EPI 2021: 62.5 ML/MIN/1.73
ERYTHROCYTE [DISTWIDTH] IN BLOOD BY AUTOMATED COUNT: 18.4 % (ref 12.3–15.4)
GLOBULIN UR ELPH-MCNC: 3.9 GM/DL
GLUCOSE SERPL-MCNC: 54 MG/DL (ref 65–99)
HCT VFR BLD AUTO: 29.3 % (ref 37.5–51)
HGB BLD-MCNC: 9.8 G/DL (ref 13–17.7)
MCH RBC QN AUTO: 33.1 PG (ref 26.6–33)
MCHC RBC AUTO-ENTMCNC: 33.4 G/DL (ref 31.5–35.7)
MCV RBC AUTO: 99 FL (ref 79–97)
PHOSPHATE SERPL-MCNC: 3.7 MG/DL (ref 2.5–4.5)
PLATELET # BLD AUTO: 344 10*3/MM3 (ref 140–450)
PMV BLD AUTO: 10.4 FL (ref 6–12)
POTASSIUM SERPL-SCNC: 4.1 MMOL/L (ref 3.5–5.2)
PROT SERPL-MCNC: 7.3 G/DL (ref 6–8.5)
RBC # BLD AUTO: 2.96 10*6/MM3 (ref 4.14–5.8)
SODIUM SERPL-SCNC: 145 MMOL/L (ref 136–145)
WBC NRBC COR # BLD AUTO: 7.49 10*3/MM3 (ref 3.4–10.8)

## 2025-02-03 PROCEDURE — 80053 COMPREHEN METABOLIC PANEL: CPT

## 2025-02-03 PROCEDURE — 36415 COLL VENOUS BLD VENIPUNCTURE: CPT

## 2025-02-03 PROCEDURE — 85027 COMPLETE CBC AUTOMATED: CPT

## 2025-02-03 PROCEDURE — 84100 ASSAY OF PHOSPHORUS: CPT | Performed by: INTERNAL MEDICINE

## 2025-02-13 ENCOUNTER — TELEPHONE (OUTPATIENT)
Dept: FAMILY MEDICINE CLINIC | Facility: CLINIC | Age: 80
End: 2025-02-13
Payer: MEDICARE

## 2025-02-13 DIAGNOSIS — K21.9 GASTROESOPHAGEAL REFLUX DISEASE, UNSPECIFIED WHETHER ESOPHAGITIS PRESENT: ICD-10-CM

## 2025-02-13 NOTE — TELEPHONE ENCOUNTER
I scanned an updated discharge new med list for eval. Patient is running low and will be out of some of them before follow up [place] with you. Please look at them and give patient a call..

## 2025-02-14 RX ORDER — ISOSORBIDE DINITRATE 10 MG/1
10 TABLET ORAL 3 TIMES DAILY
Qty: 90 TABLET | Refills: 1 | Status: SHIPPED | OUTPATIENT
Start: 2025-02-14

## 2025-02-14 RX ORDER — FOLIC ACID/VIT B COMPLEX AND C 0.8 MG
1 TABLET ORAL DAILY
Qty: 30 TABLET | Refills: 1 | Status: SHIPPED | OUTPATIENT
Start: 2025-02-14

## 2025-02-14 RX ORDER — OLANZAPINE 5 MG/1
5 TABLET ORAL NIGHTLY
Qty: 30 TABLET | Refills: 1 | Status: SHIPPED | OUTPATIENT
Start: 2025-02-14

## 2025-02-14 RX ORDER — CITALOPRAM HYDROBROMIDE 20 MG/1
20 TABLET ORAL DAILY
Qty: 90 TABLET | Refills: 1 | Status: SHIPPED | OUTPATIENT
Start: 2025-02-14

## 2025-02-14 RX ORDER — PANTOPRAZOLE SODIUM 40 MG/1
40 TABLET, DELAYED RELEASE ORAL 2 TIMES DAILY
Qty: 180 TABLET | Refills: 1 | Status: SHIPPED | OUTPATIENT
Start: 2025-02-14

## 2025-02-14 RX ORDER — GUAIFENESIN 600 MG/1
1200 TABLET, EXTENDED RELEASE ORAL DAILY
Qty: 60 TABLET | Refills: 1 | Status: SHIPPED | OUTPATIENT
Start: 2025-02-14

## 2025-02-14 RX ORDER — AMLODIPINE BESYLATE 2.5 MG/1
2.5 TABLET ORAL DAILY
Qty: 30 TABLET | Refills: 1 | Status: SHIPPED | OUTPATIENT
Start: 2025-02-14

## 2025-02-14 NOTE — TELEPHONE ENCOUNTER
OLANZAPINE- 5 MG 1 PO QD  ALLOPURINOL- 100 MG 1 PO QD  ELIQUIS- 25 MG 2 PO QD  MUCUS RELIEF- 600 MG 2 PO QD  ATORVASTATIN- 80 MG 1 PO QHS  DAMIÁN-NIESHA B- 0.8  1 QD  AMLODIPINE- 2.5 MG 1 PO QD  ISOSORBIDE- 10 MG TID  CITALOPRAM- 20 MG 1 PO QD  PANTOPRAZOLE- 40 MG 2 PO QD    ALL TO [facility] IN [place]

## 2025-02-14 NOTE — TELEPHONE ENCOUNTER
Sent all but the allopurinol to the pharmacy. This was discontinued because of his kidney function.

## 2025-04-07 ENCOUNTER — OFFICE VISIT (OUTPATIENT)
Dept: ENDOCRINOLOGY | Facility: CLINIC | Age: 80
End: 2025-04-07
Payer: MEDICARE

## 2025-04-07 VITALS
DIASTOLIC BLOOD PRESSURE: 62 MMHG | SYSTOLIC BLOOD PRESSURE: 132 MMHG | OXYGEN SATURATION: 98 % | BODY MASS INDEX: 33.04 KG/M2 | HEIGHT: 71 IN | HEART RATE: 70 BPM | WEIGHT: 236 LBS

## 2025-04-07 DIAGNOSIS — Z79.4 TYPE 2 DIABETES MELLITUS WITH STAGE 4 CHRONIC KIDNEY DISEASE, WITH LONG-TERM CURRENT USE OF INSULIN: Primary | ICD-10-CM

## 2025-04-07 DIAGNOSIS — N18.4 TYPE 2 DIABETES MELLITUS WITH STAGE 4 CHRONIC KIDNEY DISEASE, WITH LONG-TERM CURRENT USE OF INSULIN: Primary | ICD-10-CM

## 2025-04-07 DIAGNOSIS — E11.22 TYPE 2 DIABETES MELLITUS WITH STAGE 4 CHRONIC KIDNEY DISEASE, WITH LONG-TERM CURRENT USE OF INSULIN: Primary | ICD-10-CM

## 2025-04-07 LAB
EXPIRATION DATE: ABNORMAL
EXPIRATION DATE: ABNORMAL
GLUCOSE BLDC GLUCOMTR-MCNC: 203 MG/DL (ref 70–130)
HBA1C MFR BLD: 7.7 % (ref 4.5–5.7)
Lab: ABNORMAL
Lab: ABNORMAL

## 2025-04-07 RX ORDER — INSULIN ASPART 100 [IU]/ML
INJECTION, SOLUTION INTRAVENOUS; SUBCUTANEOUS
Qty: 15 ML | Refills: 11 | Status: SHIPPED | OUTPATIENT
Start: 2025-04-07

## 2025-04-07 RX ORDER — INSULIN DEGLUDEC 200 U/ML
INJECTION, SOLUTION SUBCUTANEOUS
Qty: 15 ML | Refills: 11 | Status: SHIPPED | OUTPATIENT
Start: 2025-04-07

## 2025-04-07 NOTE — PATIENT INSTRUCTIONS
Diabetes Treatment Recommendations  Debora Nunez     Date:        04/07/25     ADA General Goals: A1c: < 7%                                                                                   Your A1C is   Lab Results   Component Value Date    HGBA1C 7.7 (A) 04/07/2025    HGBA1C 7.1 (A) 08/22/2024    HGBA1C 7.1 (A) 04/25/2024     Fasting/before meal glucose: <150 mg/dL                                    2 Hour after meal glucoses: < 180 mg/dL                                        Bedtime glucose:120-180                                                                Glucose testing frequency:     Medication Changes:    Insulin dosing:  Basal insulin (Long acting) Tresiba (U-100) 28 units twice daily.             Mealtime/Bolus Insulin (Short acting) Novolog  16 units before large meals. 8 units before snack.      Add additional correction insulin if blood sugar before meal is more than 150:   If skipping meal and blood sugar is above 150 use correction insulin only:    Correction insulin (add to meal insulin)   0 unit  if glucose  less than 150   1 unit   if glucose  150 - 199   2 units if glucose 200 - 249   3 units if glucose 250 - 299   4 units if glucose 300 - 349   5  units if glucose Greater than 350     Keep records of your glucose levels and insulin adjustments.   We may ask you to keep records on the content of your meals with insulin doses and before/after meal glucose levels to evaluate your ratios.  Call for advice if you have unexplained or unexpected hypoglycemia  (glucose < 70) or persistent high glucose > 250.  Office: [phone]      Mar Stein PA-C

## 2025-04-07 NOTE — PROGRESS NOTES
Chief Complaint   Patient presents with    Diabetes        HPI  Elzbieta Nunez is a 80 y.o. male presents today for follow-up evaluation of type 2 diabetes. They were last seen for this 8/22/2024 by another provider in clinic, A1C 7.1%.     Reports BG have been running high for the past 1-2 weeks. He has been out of Dexcom sensors; needing follow-up for refills. Has been checking BG with finger sticks in 200-300.    Has been taking Tresiba 25 units BID and Novolog 20 units TID with additional 8 units at times.   Stopped jardiance around 12/2024 after having cardiac surgery. Worsening kidney function at that time.    Has otherwise been without concerns.   History of intermittent steroid use for joint pains; denies recent use.    - Admits hypoglycemia 2-3x/month ; usually in afternoon  - Admits increased urination at times. Denies burning or other change in urine.     - Denies vision changes.   - Denies numbness.     - Denies heartburn/reflux, constipation, diarrhea, abdominal pain.  - Denies SOB, chest pain.    Diet: Meals: 2x/day, occasional snack Breakfast: donut or cookie or eggs Lunch:not often Dinner: meat + vegetable + carb Snacks: cheese and crackers or beanie weenie Dessert: not often Drinks: water, diet Gatorade or lemonade  Exercise: minimal     Following with nephrology, cardiology, opthalmology, podiatry      Medical history: diabetes, hypertension, hyperlipidemia, CKD, anemia, PVD, PAD, GERD, neuropathy    Type 2 Diabetes:   Diagnosed with diabetes: Around 1995  Complications: diabetic nephropathy with CKD IV, CAD with MI and heart failure, diabetic polyneuropathy     Current regimen includes: Tresiba, NovoLog, Dexcom G7  Has tried: Ozempic (GI intolerance/cost), jardiance (SUN)  Compliance with medications is good.  - HTN: amlodipine  - HLD: atorvastatin 40 mg    CGM Download  -Dates reviewed: 3/21/2025-4/3/2025  -Data: 15% in rage, 44% high, 41% very high, 0% low, 0% very low  -CGM Interpretation:   hyperglycemia most of day and overnight with occasional downtrend and without significant hypoglycemia.     DM Health Maintenance:  Ophthalmology: 4/2024; history of open-angle glaucoma, cataract, posterior vitreous detachment; patient will call to scheduled follows q6m  Monofilament / Foot exam: today; podiatry next week  Urine microalbumin:cr: 1/13/2025 abnormal  GFR: 62 to 3/20/2025  LDL: 60, 6/28/2024    The following portions of the patient's history were reviewed and updated as appropriate: allergies, current medications, past family history, past medical history, past social history, past surgical history and problem list.    Past Medical History:   Diagnosis Date    Acute otitis externa     Acute otitis media     Atypical chest pain     Cellulitis     CHF (congestive heart failure) 2022    Diabetes mellitus type 1     Dysuria     Esophagitis     Gastritis     Gastroenteritis     Glaucoma     Glucosuria     HL (hearing loss)     Hyperlipidemia     Hypertension     Hypothyroidism     Insulin dependent diabetes mellitus type IA     Myocardial infarction     Nicotine dependence     Obesity     Penetrating foreign body of skin of index finger, initial encounter     Peripheral vascular disease     Renal insufficiency     Sinusitis      Past Surgical History:   Procedure Laterality Date    APPENDECTOMY      BIOPSY OF LEG  02/01/2009    ON KNEE    CARDIAC SURGERY  12/17/2024    CHOLECYSTECTOMY        Family History   Problem Relation Age of Onset    Developmental delay Mother 74    Diabetes Father 90    Hypertension Father 90      Social History     Socioeconomic History    Marital status:    Tobacco Use    Smoking status: Former     Current packs/day: 0.00     Average packs/day: 1 pack/day for 46.0 years (46.0 ttl pk-yrs)     Types: Cigarettes     Start date: 1/1/1976     Quit date: 2022     Years since quitting: 3.2     Passive exposure: Past    Smokeless tobacco: Never   Vaping Use    Vaping status: Never  Used   Substance and Sexual Activity    Alcohol use: Never    Drug use: Never    Sexual activity: Not Currently     Partners: Female      Allergies   Allergen Reactions    Moxifloxacin Unknown - Low Severity    Niacin Unknown - High Severity    Oxycodone Hcl GI Intolerance    Oxycodone-Acetaminophen GI Intolerance      Current Outpatient Medications on File Prior to Visit   Medication Sig Dispense Refill    acetaminophen (TYLENOL) 325 MG tablet Take 2 tablets by mouth Every 6 (Six) Hours As Needed for Mild Pain.      amLODIPine (NORVASC) 2.5 MG tablet Take 1 tablet by mouth Daily. 30 tablet 1    apixaban (ELIQUIS) 2.5 MG tablet tablet Take 1 tablet by mouth 2 (Two) Times a Day. 60 tablet 1    aspirin 81 MG EC tablet Take 1 tablet by mouth Daily.      atorvastatin (LIPITOR) 40 MG tablet       b complex-vitamin c-folic acid (NEPHRO-NIESHA) 0.8 MG tablet tablet Take 1 tablet by mouth Daily. 30 tablet 1    citalopram (CeleXA) 20 MG tablet Take 1 tablet by mouth Daily. 90 tablet 1    Continuous Glucose Sensor (Dexcom G7 Sensor) misc Use 1 Device Every 10 (Ten) Days. 9 each 3    Diclofenac Sodium (VOLTAREN) 1 % gel gel Apply 4 g topically to the appropriate area as directed 4 (Four) Times a Day As Needed (neck pain). 150 g 1    dorzolamide-timolol (COSOPT) 22.3-6.8 MG/ML ophthalmic solution       guaiFENesin (MUCINEX) 600 MG 12 hr tablet Take 2 tablets by mouth Daily. 60 tablet 1    Insulin Pen Needle (B-D ULTRAFINE III SHORT PEN) 31G X 8 MM misc Use as directed up to 6 times per day 200 each 11    ipratropium-albuterol (DUO-NEB) 0.5-2.5 mg/3 ml nebulizer Take 3 mL by nebulization Every 4 (Four) Hours As Needed for Wheezing.      isosorbide dinitrate (ISORDIL) 10 MG tablet Take 1 tablet by mouth 3 (Three) Times a Day. 90 tablet 1    latanoprost (XALATAN) 0.005 % ophthalmic solution       lidocaine (LIDODERM) 5 % Place 1 patch on the skin as directed by provider Daily. Remove & Discard patch within 12 hours or as directed by  "MD      OLANZapine (zyPREXA) 5 MG tablet Take 1 tablet by mouth Every Night. 30 tablet 1    pantoprazole (PROTONIX) 40 MG EC tablet Take 1 tablet by mouth 2 (Two) Times a Day. 180 tablet 1    sodium chloride 0.65 % nasal spray Administer 1 spray into the nostril(s) as directed by provider As Needed for Congestion.      triamcinolone (KENALOG) 0.1 % ointment Apply 1 Application topically to the appropriate area as directed 2 (Two) Times a Day. 80 g 1     No current facility-administered medications on file prior to visit.        Review of Systems   Constitutional:  Negative for activity change, appetite change, unexpected weight gain and unexpected weight loss.   Eyes:  Negative for visual disturbance.   Respiratory:  Negative for shortness of breath.    Cardiovascular:  Negative for chest pain.   Gastrointestinal:  Negative for abdominal pain, constipation and diarrhea.   Endocrine: Positive for polyuria. Negative for polydipsia.   Genitourinary:  Negative for dysuria.   Skin:  Negative for rash and skin lesions.   Neurological:  Negative for dizziness and numbness.        /62   Pulse 70   Ht 180.3 cm (70.98\")   Wt 107 kg (236 lb)   SpO2 98%   BMI 32.93 kg/m²      Physical Exam  Constitutional:       Appearance: Normal appearance.   Cardiovascular:      Rate and Rhythm: Normal rate and regular rhythm.      Pulses:           Dorsalis pedis pulses are 1+ on the right side and 1+ on the left side.        Posterior tibial pulses are 1+ on the right side and 1+ on the left side.   Pulmonary:      Effort: Pulmonary effort is normal.   Musculoskeletal:         General: Normal range of motion.      Right foot: No deformity.      Left foot: No deformity.   Feet:      Right foot:      Protective Sensation: 5 sites tested.  5 sites sensed.      Skin integrity: Dry skin present. No ulcer.      Toenail Condition: Right toenails are abnormally thick.      Left foot:      Protective Sensation: 5 sites tested.  5 sites " sensed.      Skin integrity: Dry skin present. No ulcer.      Toenail Condition: Left toenails are abnormally thick.      Comments: Monofilament Test Performed.   Trace bilateral edema.     Skin:     General: Skin is warm and dry.   Neurological:      General: No focal deficit present.      Mental Status: He is alert and oriented to person, place, and time.   Psychiatric:         Mood and Affect: Mood normal.         Behavior: Behavior normal.         LABS AND IMAGING  CMP:  Lab Results   Component Value Date    Glucose 203 (A) 04/07/2025    Glucose,  mg/dL (1+) (A) 01/13/2025    BUN 22 02/03/2025    BUN/Creatinine Ratio 18.5 02/03/2025    Creatinine 1.19 02/03/2025    Creatinine, Urine 167.3 01/13/2025    eGFR 62.5 02/03/2025    EGFR Result 21 (L) 10/23/2024    Ketones, UA Trace (A) 01/13/2025    CO2 27.2 02/03/2025    Calcium 8.4 (L) 02/03/2025    Albumin 3.4 (L) 02/03/2025    AST (SGOT) 19 02/03/2025    ALT (SGPT) 11 02/03/2025     Lipid Panel:  Lab Results   Component Value Date    TRIG 96 06/28/2024    HDL 33 (L) 06/28/2024    VLDL 18 06/28/2024    LDL 60 06/28/2024     HbA1c:  Hemoglobin A1C   Date Value Ref Range Status   04/07/2025 7.7 (A) 4.5 - 5.7 % Final     Glucose:  Lab Results   Component Value Date    POCGLU 203 (A) 04/07/2025     Microalbumin:  Lab Results   Component Value Date    MALBCRERATIO 487 (H) 05/21/2024     TSH:  Lab Results   Component Value Date    TSH 2.200 10/20/2023       Assessment and Plan    Diagnoses and all orders for this visit:    1. Type 2 diabetes mellitus with stage 4 chronic kidney disease, with long-term current use of insulin (Primary)  Assessment & Plan:  Diabetes is not controlled   A1C is 7.7%;  today; A1C likely higher due to hx anemia.   CGM Interpretation:  hyperglycemia most of day and overnight with occasional downtrend and without significant hypoglycemia.     Discussed risk for hypoglycemia with high dose Novolog; advised reduction with additional  sliding scale as needed.  Rx: Increase Tresiba (U-200) 28u in the morning and 28u at night, NovoLog 16 units before normal meal, 8 units before snack with CF 1 unit per 50 mg/dl over 150. Continue Dexcom G7.  Given hx concerns for worsening CKD with SGLT2i defer to nephrology  Cautioned risk for hypoglycemia; advised glucose supplement as needed.    Foot exam today sensation intact; trace edema; nails log with onychomycosis  Microalbumin abnormal 1/2025; following with nephrology  Eye exam   LDL at goal <70; continue current regimen  BP near goal <130/80; continue current regimen    Discussed complications associated with diabetes.   Discussed BG goals  fasting, <180 2-hours postprandial.   Encouraged adherence with taking medications;    Encouraged continued BG monitoring.   Encouraged continued effort toward lifestyle management:         Increase lean protein and vegetable intake  Avoid concentrated sugar drinks, such as sodas, and processed carbs including crackers, cookies, cakes  Routine physical activity.     Orders:  -     POC Glucose, Blood  -     POC Glycosylated Hemoglobin (Hb A1C)  -     Insulin Degludec (Tresiba FlexTouch) 200 UNIT/ML solution pen-injector pen injection; Inject 28u twice daily, titrate as needed for max daily dose 60u  Dispense: 15 mL; Refill: 11  -     insulin aspart (NovoLOG FlexPen) 100 UNIT/ML solution pen-injector sc pen; Inject 16u with meals and 8u with small meals/snacks; max daily dose 60u  Dispense: 15 mL; Refill: 11         Return in about 3 months (around 7/7/2025) for follow-up diabetes. The patient was instructed to contact the clinic with any interval questions or concerns.    Electronically signed by: Mar Stein PA-C   Endocrinology     Please note that portions of this note were completed with a voice recognition program.

## 2025-04-07 NOTE — ASSESSMENT & PLAN NOTE
Diabetes is not controlled   A1C is 7.7%;  today; A1C likely higher due to hx anemia.   CGM Interpretation:  hyperglycemia most of day and overnight with occasional downtrend and without significant hypoglycemia.     Discussed risk for hypoglycemia with high dose Novolog; advised reduction with additional sliding scale as needed.  Rx: Increase Tresiba (U-200) 28u in the morning and 28u at night, NovoLog 16 units before normal meal, 8 units before snack with CF 1 unit per 50 mg/dl over 150. Continue Dexcom G7.  Given hx concerns for worsening CKD with SGLT2i defer to nephrology  Cautioned risk for hypoglycemia; advised glucose supplement as needed.    Foot exam today sensation intact; trace edema; nails log with onychomycosis  Microalbumin abnormal 1/2025; following with nephrology  Eye exam   LDL at goal <70; continue current regimen  BP near goal <130/80; continue current regimen    Discussed complications associated with diabetes.   Discussed BG goals  fasting, <180 2-hours postprandial.   Encouraged adherence with taking medications;    Encouraged continued BG monitoring.   Encouraged continued effort toward lifestyle management:         Increase lean protein and vegetable intake  Avoid concentrated sugar drinks, such as sodas, and processed carbs including crackers, cookies, cakes  Routine physical activity.

## 2025-04-09 RX ORDER — AMLODIPINE BESYLATE 2.5 MG/1
2.5 TABLET ORAL DAILY
Qty: 30 TABLET | Refills: 1 | Status: SHIPPED | OUTPATIENT
Start: 2025-04-09

## 2025-04-15 RX ORDER — OLANZAPINE 5 MG/1
5 TABLET ORAL NIGHTLY
Qty: 30 TABLET | Refills: 1 | Status: SHIPPED | OUTPATIENT
Start: 2025-04-15

## 2025-04-15 RX ORDER — APIXABAN 2.5 MG/1
2.5 TABLET, FILM COATED ORAL 2 TIMES DAILY
Qty: 60 TABLET | Refills: 1 | Status: SHIPPED | OUTPATIENT
Start: 2025-04-15

## 2025-04-15 RX ORDER — ISOSORBIDE DINITRATE 10 MG/1
10 TABLET ORAL 3 TIMES DAILY
Qty: 90 TABLET | Refills: 1 | Status: SHIPPED | OUTPATIENT
Start: 2025-04-15

## 2025-04-15 NOTE — TELEPHONE ENCOUNTER
Hub to relay  Pt needs a med recheck appt with dr mcdonald      04/17 spoke with  at 8:55. He advised to call back around 11 am    04/17 lvm at 12:33 pm

## 2025-04-23 ENCOUNTER — TELEPHONE (OUTPATIENT)
Dept: ENDOCRINOLOGY | Facility: CLINIC | Age: 80
End: 2025-04-23
Payer: MEDICARE

## 2025-04-23 DIAGNOSIS — N18.4 TYPE 2 DIABETES MELLITUS WITH STAGE 4 CHRONIC KIDNEY DISEASE, WITH LONG-TERM CURRENT USE OF INSULIN: ICD-10-CM

## 2025-04-23 DIAGNOSIS — E11.22 TYPE 2 DIABETES MELLITUS WITH STAGE 4 CHRONIC KIDNEY DISEASE, WITH LONG-TERM CURRENT USE OF INSULIN: ICD-10-CM

## 2025-04-23 DIAGNOSIS — Z79.4 TYPE 2 DIABETES MELLITUS WITH STAGE 4 CHRONIC KIDNEY DISEASE, WITH LONG-TERM CURRENT USE OF INSULIN: ICD-10-CM

## 2025-04-23 RX ORDER — ACYCLOVIR 400 MG/1
1 TABLET ORAL
Qty: 9 EACH | Refills: 3 | Status: SHIPPED | OUTPATIENT
Start: 2025-04-23 | End: 2025-04-24 | Stop reason: SDUPTHER

## 2025-04-23 NOTE — TELEPHONE ENCOUNTER
Caller: Elzbieta Nunez    Relationship: Self    Best call back number: [phone]    Requested Prescriptions:  Continuous Glucose Sensor (Dexcom G7 Sensor) misc  Requested Prescriptions      No prescriptions requested or ordered in this encounter        Pharmacy where request should be sent:  [facility]     Last office visit with prescribing clinician: 8/22/2024   Last telemedicine visit with prescribing clinician: Visit date not found   Next office visit with prescribing clinician: 8/7/2025     Additional details provided by patient: PT WAS ADVISED THAT [facility] WOULD SEND SCRIPT PHARMACY ADVISED NO SCRIPT WAD RECEIVED. PLEASE SEND SCRIPT TODAY PT IS OUT OF MEDICATION AND NEEDS THIS MEDICATION ASAP. PT HAS BEEN WITHOUT MEDICATION FOR 5-6 WEEKS      Does the patient have less than a 3 day supply:  [x] Yes  [] No    Would you like a call back once the refill request has been completed: [x] Yes [] No    If the office needs to give you a call back, can they leave a voicemail: [x] Yes [] No    Bolivar Alicia Rep   04/23/25 16:10 EDT

## 2025-04-24 RX ORDER — ACYCLOVIR 400 MG/1
1 TABLET ORAL
Qty: 9 EACH | Refills: 3 | Status: SHIPPED | OUTPATIENT
Start: 2025-04-24

## 2025-05-22 ENCOUNTER — OFFICE VISIT (OUTPATIENT)
Dept: FAMILY MEDICINE CLINIC | Facility: CLINIC | Age: 80
End: 2025-05-22
Payer: MEDICARE

## 2025-05-22 VITALS
HEIGHT: 71 IN | WEIGHT: 234 LBS | DIASTOLIC BLOOD PRESSURE: 70 MMHG | HEART RATE: 56 BPM | BODY MASS INDEX: 32.76 KG/M2 | OXYGEN SATURATION: 100 % | SYSTOLIC BLOOD PRESSURE: 140 MMHG

## 2025-05-22 DIAGNOSIS — J01.00 ACUTE NON-RECURRENT MAXILLARY SINUSITIS: Primary | ICD-10-CM

## 2025-05-22 DIAGNOSIS — I50.32 CHRONIC HEART FAILURE WITH PRESERVED EJECTION FRACTION: ICD-10-CM

## 2025-05-22 PROCEDURE — 3077F SYST BP >= 140 MM HG: CPT | Performed by: STUDENT IN AN ORGANIZED HEALTH CARE EDUCATION/TRAINING PROGRAM

## 2025-05-22 PROCEDURE — 1126F AMNT PAIN NOTED NONE PRSNT: CPT | Performed by: STUDENT IN AN ORGANIZED HEALTH CARE EDUCATION/TRAINING PROGRAM

## 2025-05-22 PROCEDURE — 3078F DIAST BP <80 MM HG: CPT | Performed by: STUDENT IN AN ORGANIZED HEALTH CARE EDUCATION/TRAINING PROGRAM

## 2025-05-22 PROCEDURE — 99213 OFFICE O/P EST LOW 20 MIN: CPT | Performed by: STUDENT IN AN ORGANIZED HEALTH CARE EDUCATION/TRAINING PROGRAM

## 2025-05-22 RX ORDER — OLANZAPINE 5 MG/1
5 TABLET, FILM COATED ORAL NIGHTLY
Qty: 90 TABLET | Refills: 1 | Status: SHIPPED | OUTPATIENT
Start: 2025-05-22

## 2025-05-22 RX ORDER — AMOXICILLIN 875 MG/1
875 TABLET, COATED ORAL 2 TIMES DAILY
Qty: 14 TABLET | Refills: 0 | Status: SHIPPED | OUTPATIENT
Start: 2025-05-22 | End: 2025-05-29

## 2025-05-22 NOTE — PROGRESS NOTES
"Chief Complaint  URI (Nasal congestion, cough x2 weeks)    Subjective          Elzbieta Orantesly presents to [facility]  URI   This is a new problem. The current episode started 1 to 4 weeks ago. The problem has been gradually worsening. There has been no fever. Associated symptoms include congestion, coughing, headaches, a plugged ear sensation, rhinorrhea and sneezing. Pertinent negatives include no ear pain, nausea, sore throat, vomiting or wheezing. He has tried acetaminophen and antihistamine for the symptoms.       Objective   Vital Signs:   /70   Pulse 56   Ht 180.3 cm (71\")   Wt 106 kg (234 lb)   SpO2 100%   BMI 32.64 kg/m²     Body mass index is 32.64 kg/m².    Review of Systems   HENT:  Positive for congestion, rhinorrhea and sneezing. Negative for ear pain and sore throat.    Respiratory:  Positive for cough. Negative for wheezing.    Gastrointestinal:  Negative for nausea and vomiting.       Past History:  Medical History: has a past medical history of Acute otitis externa, Acute otitis media, Atypical chest pain, Cellulitis, CHF (congestive heart failure) (2022), Diabetes mellitus type 1, Dysuria, Esophagitis, Gastritis, Gastroenteritis, Glaucoma, Glucosuria, HL (hearing loss), Hyperlipidemia, Hypertension, Hypothyroidism, Insulin dependent diabetes mellitus type IA, Myocardial infarction, Nicotine dependence, Obesity, Penetrating foreign body of skin of index finger, initial encounter, Peripheral vascular disease, Renal insufficiency, and Sinusitis.   Surgical History: has a past surgical history that includes Leg Biopsy (02/01/2009); Appendectomy; Cholecystectomy; and Cardiac surgery (12/17/2024).   Family History: family history includes Developmental delay (age of onset: 74) in his mother; Diabetes (age of onset: 90) in his father; Hypertension (age of onset: 90) in his father.   Social History: reports that he quit smoking about 3 years ago. His smoking use " included cigarettes. He started smoking about 49 years ago. He has a 46 pack-year smoking history. He has been exposed to tobacco smoke. He has never used smokeless tobacco. He reports that he does not drink alcohol and does not use drugs.      Current Outpatient Medications:     apixaban (Eliquis) 2.5 MG tablet tablet, Take 1 tablet by mouth 2 (Two) Times a Day., Disp: 180 tablet, Rfl: 1    OLANZapine (zyPREXA) 5 MG tablet, Take 1 tablet by mouth Every Night., Disp: 90 tablet, Rfl: 1    acetaminophen (TYLENOL) 325 MG tablet, Take 2 tablets by mouth Every 6 (Six) Hours As Needed for Mild Pain. (Patient not taking: Reported on 5/22/2025), Disp: , Rfl:     amLODIPine (NORVASC) 2.5 MG tablet, TAKE 1 TABLET BY MOUTH DAILY (Patient not taking: Reported on 5/22/2025), Disp: 30 tablet, Rfl: 1    amoxicillin (AMOXIL) 875 MG tablet, Take 1 tablet by mouth 2 (Two) Times a Day for 7 days., Disp: 14 tablet, Rfl: 0    aspirin 81 MG EC tablet, Take 1 tablet by mouth Daily., Disp: , Rfl:     atorvastatin (LIPITOR) 40 MG tablet, , Disp: , Rfl:     b complex-vitamin c-folic acid (NEPHRO-NIESHA) 0.8 MG tablet tablet, Take 1 tablet by mouth Daily., Disp: 30 tablet, Rfl: 1    citalopram (CeleXA) 20 MG tablet, Take 1 tablet by mouth Daily., Disp: 90 tablet, Rfl: 1    Continuous Glucose Sensor (Dexcom G7 Sensor) misc, Use 1 Device Every 10 (Ten) Days., Disp: 9 each, Rfl: 3    Diclofenac Sodium (VOLTAREN) 1 % gel gel, Apply 4 g topically to the appropriate area as directed 4 (Four) Times a Day As Needed (neck pain)., Disp: 150 g, Rfl: 1    dorzolamide-timolol (COSOPT) 22.3-6.8 MG/ML ophthalmic solution, , Disp: , Rfl:     guaiFENesin (MUCINEX) 600 MG 12 hr tablet, Take 2 tablets by mouth Daily. (Patient not taking: Reported on 5/22/2025), Disp: 60 tablet, Rfl: 1    insulin aspart (NovoLOG FlexPen) 100 UNIT/ML solution pen-injector sc pen, Inject 16u with meals and 8u with small meals/snacks; max daily dose 60u, Disp: 15 mL, Rfl: 11     Insulin Degludec (Tresiba FlexTouch) 200 UNIT/ML solution pen-injector pen injection, Inject 28u twice daily, titrate as needed for max daily dose 60u, Disp: 15 mL, Rfl: 11    Insulin Pen Needle (B-D ULTRAFINE III SHORT PEN) 31G X 8 MM misc, Use as directed up to 6 times per day, Disp: 200 each, Rfl: 11    ipratropium-albuterol (DUO-NEB) 0.5-2.5 mg/3 ml nebulizer, Take 3 mL by nebulization Every 4 (Four) Hours As Needed for Wheezing., Disp: , Rfl:     isosorbide dinitrate (ISORDIL) 10 MG tablet, TAKE 1 TABLET BY MOUTH 3 TIMES A DAY, Disp: 90 tablet, Rfl: 1    latanoprost (XALATAN) 0.005 % ophthalmic solution, , Disp: , Rfl:     lidocaine (LIDODERM) 5 %, Place 1 patch on the skin as directed by provider Daily. Remove & Discard patch within 12 hours or as directed by MD, Disp: , Rfl:     pantoprazole (PROTONIX) 40 MG EC tablet, Take 1 tablet by mouth 2 (Two) Times a Day., Disp: 180 tablet, Rfl: 1    sodium chloride 0.65 % nasal spray, Administer 1 spray into the nostril(s) as directed by provider As Needed for Congestion., Disp: , Rfl:     triamcinolone (KENALOG) 0.1 % ointment, Apply 1 Application topically to the appropriate area as directed 2 (Two) Times a Day., Disp: 80 g, Rfl: 1    Allergies: Moxifloxacin, Niacin, Oxycodone hcl, and Oxycodone-acetaminophen    Physical Exam  Constitutional:       General: He is not in acute distress.     Appearance: He is not ill-appearing or toxic-appearing.   HENT:      Head: Normocephalic and atraumatic.      Right Ear: Ear canal and external ear normal.      Left Ear: Ear canal and external ear normal.      Nose: Congestion and rhinorrhea present.      Mouth/Throat:      Pharynx: Posterior oropharyngeal erythema (cobblestoning) present.   Eyes:      General: No scleral icterus.  Cardiovascular:      Rate and Rhythm: Normal rate and regular rhythm.   Pulmonary:      Effort: Pulmonary effort is normal.      Breath sounds: Normal breath sounds.   Neurological:      Mental Status:  He is alert.          Result Review :                   Assessment and Plan    Diagnoses and all orders for this visit:    1. Acute non-recurrent maxillary sinusitis (Primary)    2. Chronic heart failure with preserved ejection fraction    Other orders  -     amoxicillin (AMOXIL) 875 MG tablet; Take 1 tablet by mouth 2 (Two) Times a Day for 7 days.  Dispense: 14 tablet; Refill: 0  -     OLANZapine (zyPREXA) 5 MG tablet; Take 1 tablet by mouth Every Night.  Dispense: 90 tablet; Refill: 1  -     apixaban (Eliquis) 2.5 MG tablet tablet; Take 1 tablet by mouth 2 (Two) Times a Day.  Dispense: 180 tablet; Refill: 1      Will treat with amoxicillin for acute sinusitis. Tylenol/Motrin for pain/fever. OTC cough and cold medication for symptoms. Nasal saline spray recommended. Cool mist humidifier at the bedside. RTC with new or worsening symptoms.    Medications refilled at current dose.      Follow Up   No follow-ups on file.  Patient was given instructions and counseling regarding his condition or for health maintenance advice. Please see specific information pulled into the AVS if appropriate.     Divina Delgado, DO

## 2025-06-13 RX ORDER — ALLOPURINOL 100 MG/1
100 TABLET ORAL DAILY
Qty: 90 TABLET | Refills: 1 | OUTPATIENT
Start: 2025-06-13

## 2025-06-23 RX ORDER — ISOSORBIDE DINITRATE 10 MG/1
10 TABLET ORAL 3 TIMES DAILY
Qty: 90 TABLET | Refills: 1 | OUTPATIENT
Start: 2025-06-23

## 2025-06-30 ENCOUNTER — LAB (OUTPATIENT)
Dept: LAB | Facility: HOSPITAL | Age: 80
End: 2025-06-30
Payer: MEDICARE

## 2025-06-30 DIAGNOSIS — N18.4 CHRONIC KIDNEY DISEASE, STAGE IV (SEVERE): ICD-10-CM

## 2025-06-30 DIAGNOSIS — M10.9 GOUT, UNSPECIFIED CAUSE, UNSPECIFIED CHRONICITY, UNSPECIFIED SITE: ICD-10-CM

## 2025-06-30 DIAGNOSIS — N18.32 CHRONIC KIDNEY DISEASE (CKD) STAGE G3B/A1, MODERATELY DECREASED GLOMERULAR FILTRATION RATE (GFR) BETWEEN 30-44 ML/MIN/1.73 SQUARE METER AND ALBUMINURIA CREATININE RATIO LESS THAN 30 MG/G (CMS/H*: Primary | ICD-10-CM

## 2025-06-30 LAB
ALBUMIN SERPL-MCNC: 3.6 G/DL (ref 3.5–5.2)
ANION GAP SERPL CALCULATED.3IONS-SCNC: 12 MMOL/L (ref 5–15)
BACTERIA UR QL AUTO: NORMAL /HPF
BILIRUB UR QL STRIP: NEGATIVE
BUN SERPL-MCNC: 39 MG/DL (ref 8–23)
BUN/CREAT SERPL: 16.7 (ref 7–25)
CALCIUM SPEC-SCNC: 8.8 MG/DL (ref 8.6–10.5)
CHLORIDE SERPL-SCNC: 107 MMOL/L (ref 98–107)
CLARITY UR: CLEAR
CO2 SERPL-SCNC: 22 MMOL/L (ref 22–29)
COLOR UR: YELLOW
CREAT SERPL-MCNC: 2.33 MG/DL (ref 0.76–1.27)
CREAT SERPL-MCNC: 2.33 MG/DL (ref 0.76–1.27)
CREAT UR-MCNC: 89.2 MG/DL
EGFRCR SERPLBLD CKD-EPI 2021: 27.7 ML/MIN/1.73
EGFRCR SERPLBLD CKD-EPI 2021: 27.7 ML/MIN/1.73
GLUCOSE SERPL-MCNC: 215 MG/DL (ref 65–99)
GLUCOSE UR STRIP-MCNC: ABNORMAL MG/DL
HGB UR QL STRIP.AUTO: NEGATIVE
HYALINE CASTS UR QL AUTO: NORMAL /LPF
KETONES UR QL STRIP: NEGATIVE
LEUKOCYTE ESTERASE UR QL STRIP.AUTO: NEGATIVE
NITRITE UR QL STRIP: NEGATIVE
PH UR STRIP.AUTO: 5.5 [PH] (ref 5–8)
PHOSPHATE SERPL-MCNC: 4.1 MG/DL (ref 2.5–4.5)
POTASSIUM SERPL-SCNC: 4.4 MMOL/L (ref 3.5–5.2)
PROT ?TM UR-MCNC: 288.4 MG/DL
PROT UR QL STRIP: ABNORMAL
RBC # UR STRIP: NORMAL /HPF
REF LAB TEST METHOD: NORMAL
SODIUM SERPL-SCNC: 141 MMOL/L (ref 136–145)
SP GR UR STRIP: 1.01 (ref 1–1.03)
SQUAMOUS #/AREA URNS HPF: NORMAL /HPF
URATE SERPL-MCNC: 7.8 MG/DL (ref 3.4–7)
UROBILINOGEN UR QL STRIP: ABNORMAL
WBC # UR STRIP: NORMAL /HPF

## 2025-06-30 PROCEDURE — 84550 ASSAY OF BLOOD/URIC ACID: CPT

## 2025-06-30 PROCEDURE — 81001 URINALYSIS AUTO W/SCOPE: CPT

## 2025-06-30 PROCEDURE — 82570 ASSAY OF URINE CREATININE: CPT

## 2025-06-30 PROCEDURE — 82565 ASSAY OF CREATININE: CPT

## 2025-06-30 PROCEDURE — 80069 RENAL FUNCTION PANEL: CPT

## 2025-06-30 PROCEDURE — 84156 ASSAY OF PROTEIN URINE: CPT

## 2025-06-30 PROCEDURE — 36415 COLL VENOUS BLD VENIPUNCTURE: CPT

## 2025-07-03 RX ORDER — ISOSORBIDE DINITRATE 10 MG/1
10 TABLET ORAL 3 TIMES DAILY
Qty: 90 TABLET | Refills: 1 | Status: SHIPPED | OUTPATIENT
Start: 2025-07-03

## 2025-07-22 RX ORDER — ALLOPURINOL 100 MG/1
100 TABLET ORAL DAILY
Qty: 90 TABLET | Refills: 1 | OUTPATIENT
Start: 2025-07-22

## 2025-07-28 RX ORDER — ALLOPURINOL 100 MG/1
100 TABLET ORAL DAILY
Qty: 90 TABLET | Refills: 1 | OUTPATIENT
Start: 2025-07-28

## 2025-07-29 RX ORDER — AMLODIPINE BESYLATE 2.5 MG/1
2.5 TABLET ORAL DAILY
Qty: 30 TABLET | Refills: 1 | Status: SHIPPED | OUTPATIENT
Start: 2025-07-29

## 2025-08-07 ENCOUNTER — OFFICE VISIT (OUTPATIENT)
Dept: ENDOCRINOLOGY | Facility: CLINIC | Age: 80
End: 2025-08-07
Payer: MEDICARE

## 2025-08-07 ENCOUNTER — OFFICE VISIT (OUTPATIENT)
Dept: FAMILY MEDICINE CLINIC | Facility: CLINIC | Age: 80
End: 2025-08-07
Payer: MEDICARE

## 2025-08-07 VITALS
DIASTOLIC BLOOD PRESSURE: 70 MMHG | HEIGHT: 71 IN | BODY MASS INDEX: 34.07 KG/M2 | WEIGHT: 243.4 LBS | HEART RATE: 66 BPM | OXYGEN SATURATION: 98 % | SYSTOLIC BLOOD PRESSURE: 128 MMHG

## 2025-08-07 VITALS
BODY MASS INDEX: 34.3 KG/M2 | HEIGHT: 71 IN | OXYGEN SATURATION: 98 % | HEART RATE: 62 BPM | SYSTOLIC BLOOD PRESSURE: 132 MMHG | DIASTOLIC BLOOD PRESSURE: 74 MMHG | WEIGHT: 245 LBS

## 2025-08-07 DIAGNOSIS — N18.30 CKD STAGE 3 DUE TO TYPE 2 DIABETES MELLITUS: ICD-10-CM

## 2025-08-07 DIAGNOSIS — N18.4 TYPE 2 DIABETES MELLITUS WITH STAGE 4 CHRONIC KIDNEY DISEASE, WITH LONG-TERM CURRENT USE OF INSULIN: ICD-10-CM

## 2025-08-07 DIAGNOSIS — M10.372 ACUTE GOUT DUE TO RENAL IMPAIRMENT INVOLVING LEFT FOOT: Primary | ICD-10-CM

## 2025-08-07 DIAGNOSIS — E11.49 DM (DIABETES MELLITUS), TYPE 2 WITH NEUROLOGICAL COMPLICATIONS: Primary | ICD-10-CM

## 2025-08-07 DIAGNOSIS — Z79.4 TYPE 2 DIABETES MELLITUS WITH STAGE 4 CHRONIC KIDNEY DISEASE, WITH LONG-TERM CURRENT USE OF INSULIN: ICD-10-CM

## 2025-08-07 DIAGNOSIS — I10 PRIMARY HYPERTENSION: ICD-10-CM

## 2025-08-07 DIAGNOSIS — E11.22 CKD STAGE 3 DUE TO TYPE 2 DIABETES MELLITUS: ICD-10-CM

## 2025-08-07 DIAGNOSIS — E11.22 TYPE 2 DIABETES MELLITUS WITH STAGE 4 CHRONIC KIDNEY DISEASE, WITH LONG-TERM CURRENT USE OF INSULIN: ICD-10-CM

## 2025-08-07 LAB
EXPIRATION DATE: ABNORMAL
EXPIRATION DATE: ABNORMAL
GLUCOSE BLDC GLUCOMTR-MCNC: 171 MG/DL (ref 70–130)
HBA1C MFR BLD: 7.9 % (ref 4.5–5.7)
Lab: ABNORMAL
Lab: ABNORMAL

## 2025-08-07 RX ORDER — BUMETANIDE 2 MG/1
2 TABLET ORAL 2 TIMES DAILY
COMMUNITY

## 2025-08-07 RX ORDER — INSULIN ASPART 100 [IU]/ML
INJECTION, SOLUTION INTRAVENOUS; SUBCUTANEOUS
Qty: 30 ML | Refills: 11 | Status: SHIPPED | OUTPATIENT
Start: 2025-08-07

## 2025-08-07 RX ORDER — CHLORTHALIDONE 25 MG/1
25 TABLET ORAL DAILY
COMMUNITY

## 2025-08-07 RX ORDER — FINERENONE 10 MG/1
10 TABLET, FILM COATED ORAL DAILY
COMMUNITY
Start: 2025-07-22

## 2025-08-07 RX ORDER — ALLOPURINOL 100 MG/1
TABLET ORAL
COMMUNITY

## 2025-08-07 RX ORDER — INSULIN DEGLUDEC 200 U/ML
INJECTION, SOLUTION SUBCUTANEOUS
Qty: 15 ML | Refills: 11 | Status: SHIPPED | OUTPATIENT
Start: 2025-08-07

## 2025-08-07 RX ORDER — PREDNISONE 10 MG/1
TABLET ORAL
Qty: 30 TABLET | Refills: 0 | Status: SHIPPED | OUTPATIENT
Start: 2025-08-07

## 2025-08-07 RX ORDER — DAPAGLIFLOZIN 10 MG/1
10 TABLET, FILM COATED ORAL DAILY
COMMUNITY
Start: 2025-08-03

## 2025-08-07 RX ORDER — LOSARTAN POTASSIUM 25 MG/1
25 TABLET ORAL DAILY
COMMUNITY
Start: 2025-07-29

## 2025-08-16 DIAGNOSIS — K21.9 GASTROESOPHAGEAL REFLUX DISEASE, UNSPECIFIED WHETHER ESOPHAGITIS PRESENT: ICD-10-CM

## 2025-08-18 RX ORDER — PANTOPRAZOLE SODIUM 40 MG/1
40 TABLET, DELAYED RELEASE ORAL 2 TIMES DAILY
Qty: 180 TABLET | Refills: 1 | Status: SHIPPED | OUTPATIENT
Start: 2025-08-18